# Patient Record
Sex: MALE | Race: OTHER | HISPANIC OR LATINO | URBAN - METROPOLITAN AREA
[De-identification: names, ages, dates, MRNs, and addresses within clinical notes are randomized per-mention and may not be internally consistent; named-entity substitution may affect disease eponyms.]

---

## 2017-05-28 ENCOUNTER — INPATIENT (INPATIENT)
Facility: HOSPITAL | Age: 72
LOS: 4 days | Discharge: NURSING, REHAB OR LONG-TERM CARE | End: 2017-06-02
Attending: INTERNAL MEDICINE | Admitting: INTERNAL MEDICINE
Payer: MEDICARE

## 2017-05-28 VITALS
TEMPERATURE: 99 F | RESPIRATION RATE: 18 BRPM | DIASTOLIC BLOOD PRESSURE: 63 MMHG | HEART RATE: 82 BPM | SYSTOLIC BLOOD PRESSURE: 111 MMHG | OXYGEN SATURATION: 100 %

## 2017-05-28 DIAGNOSIS — Z29.9 ENCOUNTER FOR PROPHYLACTIC MEASURES, UNSPECIFIED: ICD-10-CM

## 2017-05-28 DIAGNOSIS — K46.9 UNSPECIFIED ABDOMINAL HERNIA WITHOUT OBSTRUCTION OR GANGRENE: Chronic | ICD-10-CM

## 2017-05-28 DIAGNOSIS — E87.1 HYPO-OSMOLALITY AND HYPONATREMIA: ICD-10-CM

## 2017-05-28 DIAGNOSIS — R41.82 ALTERED MENTAL STATUS, UNSPECIFIED: ICD-10-CM

## 2017-05-28 DIAGNOSIS — A41.9 SEPSIS, UNSPECIFIED ORGANISM: ICD-10-CM

## 2017-05-28 LAB
ACANTHOCYTES BLD QL SMEAR: SLIGHT — SIGNIFICANT CHANGE UP
ALBUMIN SERPL ELPH-MCNC: 3.1 G/DL — LOW (ref 3.3–5)
ALP SERPL-CCNC: 113 U/L — SIGNIFICANT CHANGE UP (ref 40–120)
ALT FLD-CCNC: 54 U/L — HIGH (ref 4–41)
APPEARANCE UR: SIGNIFICANT CHANGE UP
AST SERPL-CCNC: 90 U/L — HIGH (ref 4–40)
BASE EXCESS BLDV CALC-SCNC: 0.8 MMOL/L — SIGNIFICANT CHANGE UP
BASOPHILS # BLD AUTO: 0 K/UL — SIGNIFICANT CHANGE UP (ref 0–0.2)
BASOPHILS NFR BLD AUTO: 0 % — SIGNIFICANT CHANGE UP (ref 0–2)
BASOPHILS NFR SPEC: 0 % — SIGNIFICANT CHANGE UP (ref 0–2)
BILIRUB SERPL-MCNC: 1.5 MG/DL — HIGH (ref 0.2–1.2)
BILIRUB UR-MCNC: NEGATIVE — SIGNIFICANT CHANGE UP
BLOOD GAS VENOUS - CREATININE: 1.27 MG/DL — SIGNIFICANT CHANGE UP (ref 0.5–1.3)
BLOOD UR QL VISUAL: HIGH
BUN SERPL-MCNC: 24 MG/DL — HIGH (ref 7–23)
BUN SERPL-MCNC: 27 MG/DL — HIGH (ref 7–23)
CALCIUM SERPL-MCNC: 7.2 MG/DL — LOW (ref 8.4–10.5)
CALCIUM SERPL-MCNC: 8.2 MG/DL — LOW (ref 8.4–10.5)
CHLORIDE BLDV-SCNC: 82 MMOL/L — LOW (ref 96–108)
CHLORIDE SERPL-SCNC: 76 MMOL/L — LOW (ref 98–107)
CHLORIDE SERPL-SCNC: 85 MMOL/L — LOW (ref 98–107)
CHLORIDE UR-SCNC: 4 MMOL/L — SIGNIFICANT CHANGE UP
CK MB BLD-MCNC: 5.3 — HIGH (ref 0–2.5)
CK MB BLD-MCNC: 8.19 NG/ML — HIGH (ref 1–6.6)
CK SERPL-CCNC: 156 U/L — SIGNIFICANT CHANGE UP (ref 30–200)
CO2 SERPL-SCNC: 18 MMOL/L — LOW (ref 22–31)
CO2 SERPL-SCNC: 22 MMOL/L — SIGNIFICANT CHANGE UP (ref 22–31)
COLOR SPEC: YELLOW — SIGNIFICANT CHANGE UP
CREAT SERPL-MCNC: 1.09 MG/DL — SIGNIFICANT CHANGE UP (ref 0.5–1.3)
CREAT SERPL-MCNC: 1.25 MG/DL — SIGNIFICANT CHANGE UP (ref 0.5–1.3)
EOSINOPHIL # BLD AUTO: 0 K/UL — SIGNIFICANT CHANGE UP (ref 0–0.5)
EOSINOPHIL NFR BLD AUTO: 0 % — SIGNIFICANT CHANGE UP (ref 0–6)
EOSINOPHIL NFR FLD: 0.9 % — SIGNIFICANT CHANGE UP (ref 0–6)
GAS PNL BLDV: 108 MMOL/L — CRITICAL LOW (ref 136–146)
GIANT PLATELETS BLD QL SMEAR: PRESENT — SIGNIFICANT CHANGE UP
GLUCOSE BLDV-MCNC: 105 — HIGH (ref 70–99)
GLUCOSE SERPL-MCNC: 102 MG/DL — HIGH (ref 70–99)
GLUCOSE SERPL-MCNC: 90 MG/DL — SIGNIFICANT CHANGE UP (ref 70–99)
GLUCOSE UR-MCNC: NEGATIVE — SIGNIFICANT CHANGE UP
HCO3 BLDV-SCNC: 24 MMOL/L — SIGNIFICANT CHANGE UP (ref 20–27)
HCT VFR BLD CALC: 37.3 % — LOW (ref 39–50)
HCT VFR BLDV CALC: 42.3 % — SIGNIFICANT CHANGE UP (ref 39–51)
HGB BLD-MCNC: 13.6 G/DL — SIGNIFICANT CHANGE UP (ref 13–17)
HGB BLDV-MCNC: 13.8 G/DL — SIGNIFICANT CHANGE UP (ref 13–17)
IMM GRANULOCYTES NFR BLD AUTO: 1.1 % — SIGNIFICANT CHANGE UP (ref 0–1.5)
KETONES UR-MCNC: NEGATIVE — SIGNIFICANT CHANGE UP
LACTATE BLDV-MCNC: 1.9 MMOL/L — SIGNIFICANT CHANGE UP (ref 0.5–2)
LEUKOCYTE ESTERASE UR-ACNC: HIGH
LIDOCAIN IGE QN: 144.6 U/L — HIGH (ref 7–60)
LYMPHOCYTES # BLD AUTO: 0.32 K/UL — LOW (ref 1–3.3)
LYMPHOCYTES # BLD AUTO: 2.7 % — LOW (ref 13–44)
LYMPHOCYTES NFR SPEC AUTO: 2.6 % — LOW (ref 13–44)
MAGNESIUM SERPL-MCNC: 1.8 MG/DL — SIGNIFICANT CHANGE UP (ref 1.6–2.6)
MCHC RBC-ENTMCNC: 30.1 PG — SIGNIFICANT CHANGE UP (ref 27–34)
MCHC RBC-ENTMCNC: 36.5 % — HIGH (ref 32–36)
MCV RBC AUTO: 82.5 FL — SIGNIFICANT CHANGE UP (ref 80–100)
MONOCYTES # BLD AUTO: 0.39 K/UL — SIGNIFICANT CHANGE UP (ref 0–0.9)
MONOCYTES NFR BLD AUTO: 3.3 % — SIGNIFICANT CHANGE UP (ref 2–14)
MONOCYTES NFR BLD: 0.9 % — LOW (ref 2–9)
NEUTROPHIL AB SER-ACNC: 88.6 % — HIGH (ref 43–77)
NEUTROPHILS # BLD AUTO: 11.07 K/UL — HIGH (ref 1.8–7.4)
NEUTROPHILS NFR BLD AUTO: 92.9 % — HIGH (ref 43–77)
NEUTS BAND # BLD: 6.1 % — HIGH (ref 0–6)
NITRITE UR-MCNC: NEGATIVE — SIGNIFICANT CHANGE UP
NON-SQ EPI CELLS # UR AUTO: <1 — SIGNIFICANT CHANGE UP
OSMOLALITY SERPL: 244 MOSMO/KG — LOW (ref 275–295)
OSMOLALITY UR: 126 MOSMO/KG — SIGNIFICANT CHANGE UP (ref 50–1200)
OVALOCYTES BLD QL SMEAR: SLIGHT — SIGNIFICANT CHANGE UP
PCO2 BLDV: 37 MMHG — LOW (ref 41–51)
PH BLDV: 7.44 PH — HIGH (ref 7.32–7.43)
PH UR: 6 — SIGNIFICANT CHANGE UP (ref 4.6–8)
PLATELET # BLD AUTO: 120 K/UL — LOW (ref 150–400)
PLATELET COUNT - ESTIMATE: SIGNIFICANT CHANGE UP
PMV BLD: 10.6 FL — SIGNIFICANT CHANGE UP (ref 7–13)
PO2 BLDV: < 24 MMHG — LOW (ref 35–40)
POIKILOCYTOSIS BLD QL AUTO: SLIGHT — SIGNIFICANT CHANGE UP
POTASSIUM BLDV-SCNC: 3.8 MMOL/L — SIGNIFICANT CHANGE UP (ref 3.4–4.5)
POTASSIUM SERPL-MCNC: 3.7 MMOL/L — SIGNIFICANT CHANGE UP (ref 3.5–5.3)
POTASSIUM SERPL-MCNC: 4.2 MMOL/L — SIGNIFICANT CHANGE UP (ref 3.5–5.3)
POTASSIUM SERPL-SCNC: 3.7 MMOL/L — SIGNIFICANT CHANGE UP (ref 3.5–5.3)
POTASSIUM SERPL-SCNC: 4.2 MMOL/L — SIGNIFICANT CHANGE UP (ref 3.5–5.3)
POTASSIUM UR-SCNC: 6.9 MEQ/L — SIGNIFICANT CHANGE UP
PROT SERPL-MCNC: 7.1 G/DL — SIGNIFICANT CHANGE UP (ref 6–8.3)
PROT UR-MCNC: 30 — SIGNIFICANT CHANGE UP
RBC # BLD: 4.52 M/UL — SIGNIFICANT CHANGE UP (ref 4.2–5.8)
RBC # FLD: 13.9 % — SIGNIFICANT CHANGE UP (ref 10.3–14.5)
RBC CASTS # UR COMP ASSIST: SIGNIFICANT CHANGE UP (ref 0–?)
SAO2 % BLDV: 20.8 % — LOW (ref 60–85)
SODIUM SERPL-SCNC: 115 MMOL/L — CRITICAL LOW (ref 135–145)
SODIUM SERPL-SCNC: 119 MMOL/L — CRITICAL LOW (ref 135–145)
SODIUM UR-SCNC: 6 MEQ/L — SIGNIFICANT CHANGE UP
SP GR SPEC: 1.01 — SIGNIFICANT CHANGE UP (ref 1–1.03)
SQUAMOUS # UR AUTO: SIGNIFICANT CHANGE UP
TROPONIN T SERPL-MCNC: < 0.06 NG/ML — SIGNIFICANT CHANGE UP (ref 0–0.06)
UROBILINOGEN FLD QL: NORMAL E.U. — SIGNIFICANT CHANGE UP (ref 0.1–0.2)
VARIANT LYMPHS # BLD: 0.9 % — SIGNIFICANT CHANGE UP
WBC # BLD: 11.91 K/UL — HIGH (ref 3.8–10.5)
WBC # FLD AUTO: 11.91 K/UL — HIGH (ref 3.8–10.5)
WBC UR QL: >50 — HIGH (ref 0–?)

## 2017-05-28 PROCEDURE — 99223 1ST HOSP IP/OBS HIGH 75: CPT

## 2017-05-28 PROCEDURE — 70450 CT HEAD/BRAIN W/O DYE: CPT | Mod: 26

## 2017-05-28 PROCEDURE — 99291 CRITICAL CARE FIRST HOUR: CPT

## 2017-05-28 PROCEDURE — 74177 CT ABD & PELVIS W/CONTRAST: CPT | Mod: 26

## 2017-05-28 RX ORDER — PIPERACILLIN AND TAZOBACTAM 4; .5 G/20ML; G/20ML
3.38 INJECTION, POWDER, LYOPHILIZED, FOR SOLUTION INTRAVENOUS ONCE
Qty: 0 | Refills: 0 | Status: COMPLETED | OUTPATIENT
Start: 2017-05-28 | End: 2017-05-28

## 2017-05-28 RX ORDER — SODIUM CHLORIDE 9 MG/ML
1000 INJECTION INTRAMUSCULAR; INTRAVENOUS; SUBCUTANEOUS
Qty: 0 | Refills: 0 | Status: DISCONTINUED | OUTPATIENT
Start: 2017-05-28 | End: 2017-05-29

## 2017-05-28 RX ORDER — HEPARIN SODIUM 5000 [USP'U]/ML
5000 INJECTION INTRAVENOUS; SUBCUTANEOUS EVERY 8 HOURS
Qty: 0 | Refills: 0 | Status: DISCONTINUED | OUTPATIENT
Start: 2017-05-28 | End: 2017-06-02

## 2017-05-28 RX ORDER — SODIUM CHLORIDE 9 MG/ML
2000 INJECTION INTRAMUSCULAR; INTRAVENOUS; SUBCUTANEOUS ONCE
Qty: 0 | Refills: 0 | Status: COMPLETED | OUTPATIENT
Start: 2017-05-28 | End: 2017-05-28

## 2017-05-28 RX ORDER — ACETAMINOPHEN 500 MG
1000 TABLET ORAL ONCE
Qty: 0 | Refills: 0 | Status: COMPLETED | OUTPATIENT
Start: 2017-05-28 | End: 2017-05-28

## 2017-05-28 RX ORDER — SODIUM CHLORIDE 5 G/100ML
500 INJECTION, SOLUTION INTRAVENOUS
Qty: 0 | Refills: 0 | Status: DISCONTINUED | OUTPATIENT
Start: 2017-05-28 | End: 2017-05-28

## 2017-05-28 RX ADMIN — SODIUM CHLORIDE 30 MILLILITER(S): 5 INJECTION, SOLUTION INTRAVENOUS at 18:00

## 2017-05-28 RX ADMIN — SODIUM CHLORIDE 2000 MILLILITER(S): 9 INJECTION INTRAMUSCULAR; INTRAVENOUS; SUBCUTANEOUS at 15:29

## 2017-05-28 RX ADMIN — SODIUM CHLORIDE 125 MILLILITER(S): 9 INJECTION INTRAMUSCULAR; INTRAVENOUS; SUBCUTANEOUS at 22:27

## 2017-05-28 RX ADMIN — Medication 400 MILLIGRAM(S): at 15:39

## 2017-05-28 RX ADMIN — PIPERACILLIN AND TAZOBACTAM 200 GRAM(S): 4; .5 INJECTION, POWDER, LYOPHILIZED, FOR SOLUTION INTRAVENOUS at 16:38

## 2017-05-28 NOTE — CONSULT NOTE ADULT - PROBLEM SELECTOR RECOMMENDATION 3
- CTH did not reveal acute intracranial mass-effect, hemorrhage, midline shift, or   abnormal extra-axial fluid collection. Pt has evidence of decreased attenuation in the white matter likely  microvascular ischemic change on CTH.   - AMS likely due to hyponatremia and sepsis.

## 2017-05-28 NOTE — ED ADULT TRIAGE NOTE - CHIEF COMPLAINT QUOTE
Pt brought in by son for multiple complaints. Per son, pt travelled to [facility] and returned 1 month ago acting differently, having periods of disorientation, also c/o L groin pain - was seen at another hospital and dx w/ a hernia. Pt also c/o numbness in bilat LE which got worse yesterday, per son pt couldn't walk at that time. Pt A&OX3, ambulatory, appears in no distress at this time.

## 2017-05-28 NOTE — ED PROVIDER NOTE - MEDICAL DECISION MAKING DETAILS
71 y/o M PMHx HTN and multiple abdominal hernia surgeries brought in by his sons for waxing and waning mental status x 1 month. and pain of left lower abdomen.  Plan labs, CT scan of abdomen. U/A to Rule out sepsis

## 2017-05-28 NOTE — ED ADULT NURSE NOTE - CHIEF COMPLAINT QUOTE
Pt brought in by son for multiple complaints. Per son, pt travelled to [facility] and returned 1 month ago acting differently, having periods of disorientation, also c/o L groin pain - was seen at another hospital and dx w/ a hernia. Pt also c/o numbness in bilat LE which got worse yesterday, per son pt couldn't walk at that time. Pt A&OX3, ambulatory, appears in no distress at this time.

## 2017-05-28 NOTE — ED PROVIDER NOTE - NS ED ATTENDING STATEMENT MOD
I have personally seen and examined this patient. I have fully participated in the care of this patient. I have reviewed all pertinent clinical information, including history physical exam, plan and the Resident's note and agree except as noted I have personally performed a face to face diagnostic evaluation on this patient. I have reviewed the PA note and agree with the history, exam, and plan of care, except as noted. I have personally performed a face to face diagnostic evaluation on this patient. I have reviewed the ACP note and agree with the history, exam and plan of care, except as noted. I have personally seen and examined this patient.  I have fully participated in the care of this patient. I have reviewed all pertinent clinical information, including history, physical exam, plan and the Resident’s note and agree except as noted.

## 2017-05-28 NOTE — ED PROVIDER NOTE - CARE PLAN
Principal Discharge DX:	Hyponatremia Principal Discharge DX:	Hyponatremia  Secondary Diagnosis:	Altered mental state

## 2017-05-28 NOTE — ED ADULT NURSE NOTE - OBJECTIVE STATEMENT
pt accompanied by sons according to family pt returned form [facility] one month ago has been disoriented at times since has had tremors difficulty walking pt c/o numbness to his lower extremities MD reddyated

## 2017-05-28 NOTE — ED PROVIDER NOTE - OBJECTIVE STATEMENT
71 y/o M PMHx HTN and multiple abdominal hernia surgeries brought in by his sons for waxing and waning mental status x 1 month. Pt himself is complaining of LLQ abdominal pain and testicular pain (per sons, chronic after getting hernia surgeries done- was seen by his surgeon at [place] last week, no intervention was necessary). Pt is also c/o chills and lower ext pain x 1 week. Sons are concerned pt had a stroke, has had intermittent dysarthria and difficulty with gait. Per sons, the pt was brought into his house by the superintendant of his building yesterday. Today, the sons tried to get into his house, found him on the bathroom floor confused. Pt states he has not had any issues with his memory. Denies chest pain, N/V, or any other complaints. Admits anorexia.

## 2017-05-28 NOTE — PATIENT PROFILE ADULT. - NS TRANSFER PATIENT BELONGINGS
Jewelry/Money (specify)/Clothing/Cell Phone/PDA (specify)/2 rings. one gold colored. one with  figure. gold colored necklace

## 2017-05-28 NOTE — CONSULT NOTE ADULT - ASSESSMENT
Pt is a 72yoM w/ PMH of BPH, gallstones, gastric ulcer (hx unclear), diverticulosis, who presented from home with AMSx10 days likely 2/2 UTI, with hypovolemic hyponatremia likely 2/2 restricted Na intake with poor PO intake.     Pt currently does not meet criteria for MICU admission.

## 2017-05-28 NOTE — ED PROVIDER NOTE - ENMT, MLM
Airway patent, Nose No congestion, throat- membranes dry. No swelling or exudate. Neck supple. No JVD, No lymphadenopathy

## 2017-05-28 NOTE — ED PROVIDER NOTE - ATTENDING CONTRIBUTION TO CARE
DANIELLE Attending Note - Dr. Marx  71 y/o M PMHx HTN and multiple abdominal hernia surgeries brought in by his sons for waxing and waning mental status x 1 month now with loft lower groin pain.  PE: pt is alert and oriented only to family and self, perrl, ent normal, membranes are moist, neck supple. no lymphadenopathy or thyroid enlargement, No JVD.  Chest clear to P&A, Heart- reg rhythm without murmur, rubs or gallops, radial pulses equal bilaterally.  Abd is soft, with left lower quadrant tenderness a firm 2cm mass over left groin with tenderness, Bowel sounds are active. no mass or organomegaly. : No CVA tenderness. Neuro:  Pt alert and oriented x 3. Perrl    Distal neurosensory is intact. Motor function is 5/5 strength bilaterally.  No focal deficits. Extremities:  No edema.  Skin: warm and dry. DANIELLE Attending Note - Dr. Marx   73 y/o M PMHx HTN and multiple abdominal hernia surgeries brought in by his sons for waxing and waning mental status x 1 month. Pt himself is complaining of chronic LLQ abdominal pain and testicular pain since his hernia surgeres.  PE: pt is alert but mildly confused, perrl, ent normal, membranes are dry, neck supple. no lymphadenopathy or thyroid enlargement, No JVD.  Chest clear to P&A, Heart- reg rhythm without murmur, rubs or gallops, radial pulses equal bilaterally.  Abd is soft, non-tender, Bowel sounds are active. no mass or organomegaly. : No CVA tenderness. Neuro:  Pt alert and oriented x 3. Perrl    Distal neurosensory is intact. Motor function is 5/5 strength bilaterally.  No focal deficits. Extremities:  No edema.  Skin: warm and dry with poor skin turgor.

## 2017-05-29 DIAGNOSIS — G93.40 ENCEPHALOPATHY, UNSPECIFIED: ICD-10-CM

## 2017-05-29 DIAGNOSIS — N40.0 BENIGN PROSTATIC HYPERPLASIA WITHOUT LOWER URINARY TRACT SYMPTOMS: ICD-10-CM

## 2017-05-29 DIAGNOSIS — R41.82 ALTERED MENTAL STATUS, UNSPECIFIED: ICD-10-CM

## 2017-05-29 DIAGNOSIS — I10 ESSENTIAL (PRIMARY) HYPERTENSION: ICD-10-CM

## 2017-05-29 LAB
BUN SERPL-MCNC: 18 MG/DL — SIGNIFICANT CHANGE UP (ref 7–23)
BUN SERPL-MCNC: 19 MG/DL — SIGNIFICANT CHANGE UP (ref 7–23)
BUN SERPL-MCNC: 20 MG/DL — SIGNIFICANT CHANGE UP (ref 7–23)
BUN SERPL-MCNC: 20 MG/DL — SIGNIFICANT CHANGE UP (ref 7–23)
CALCIUM SERPL-MCNC: 7.2 MG/DL — LOW (ref 8.4–10.5)
CALCIUM SERPL-MCNC: 7.2 MG/DL — LOW (ref 8.4–10.5)
CALCIUM SERPL-MCNC: 7.4 MG/DL — LOW (ref 8.4–10.5)
CALCIUM SERPL-MCNC: 7.5 MG/DL — LOW (ref 8.4–10.5)
CHLORIDE SERPL-SCNC: 89 MMOL/L — LOW (ref 98–107)
CHLORIDE SERPL-SCNC: 91 MMOL/L — LOW (ref 98–107)
CHLORIDE SERPL-SCNC: 92 MMOL/L — LOW (ref 98–107)
CHLORIDE SERPL-SCNC: 93 MMOL/L — LOW (ref 98–107)
CO2 SERPL-SCNC: 17 MMOL/L — LOW (ref 22–31)
CO2 SERPL-SCNC: 18 MMOL/L — LOW (ref 22–31)
CO2 SERPL-SCNC: 19 MMOL/L — LOW (ref 22–31)
CO2 SERPL-SCNC: 19 MMOL/L — LOW (ref 22–31)
CREAT SERPL-MCNC: 0.95 MG/DL — SIGNIFICANT CHANGE UP (ref 0.5–1.3)
CREAT SERPL-MCNC: 1.01 MG/DL — SIGNIFICANT CHANGE UP (ref 0.5–1.3)
CREAT SERPL-MCNC: 1.01 MG/DL — SIGNIFICANT CHANGE UP (ref 0.5–1.3)
CREAT SERPL-MCNC: 1.06 MG/DL — SIGNIFICANT CHANGE UP (ref 0.5–1.3)
GLUCOSE SERPL-MCNC: 130 MG/DL — HIGH (ref 70–99)
GLUCOSE SERPL-MCNC: 72 MG/DL — SIGNIFICANT CHANGE UP (ref 70–99)
GLUCOSE SERPL-MCNC: 82 MG/DL — SIGNIFICANT CHANGE UP (ref 70–99)
GLUCOSE SERPL-MCNC: 99 MG/DL — SIGNIFICANT CHANGE UP (ref 70–99)
POTASSIUM SERPL-MCNC: 3.5 MMOL/L — SIGNIFICANT CHANGE UP (ref 3.5–5.3)
POTASSIUM SERPL-MCNC: 3.6 MMOL/L — SIGNIFICANT CHANGE UP (ref 3.5–5.3)
POTASSIUM SERPL-MCNC: 3.6 MMOL/L — SIGNIFICANT CHANGE UP (ref 3.5–5.3)
POTASSIUM SERPL-MCNC: 3.8 MMOL/L — SIGNIFICANT CHANGE UP (ref 3.5–5.3)
POTASSIUM SERPL-SCNC: 3.5 MMOL/L — SIGNIFICANT CHANGE UP (ref 3.5–5.3)
POTASSIUM SERPL-SCNC: 3.6 MMOL/L — SIGNIFICANT CHANGE UP (ref 3.5–5.3)
POTASSIUM SERPL-SCNC: 3.6 MMOL/L — SIGNIFICANT CHANGE UP (ref 3.5–5.3)
POTASSIUM SERPL-SCNC: 3.8 MMOL/L — SIGNIFICANT CHANGE UP (ref 3.5–5.3)
SODIUM SERPL-SCNC: 123 MMOL/L — LOW (ref 135–145)
SODIUM SERPL-SCNC: 125 MMOL/L — LOW (ref 135–145)
SODIUM SERPL-SCNC: 125 MMOL/L — LOW (ref 135–145)
SODIUM SERPL-SCNC: 126 MMOL/L — LOW (ref 135–145)
SPECIMEN SOURCE: SIGNIFICANT CHANGE UP

## 2017-05-29 RX ORDER — DOCUSATE SODIUM 100 MG
100 CAPSULE ORAL
Qty: 0 | Refills: 0 | Status: DISCONTINUED | OUTPATIENT
Start: 2017-05-29 | End: 2017-06-02

## 2017-05-29 RX ORDER — IPRATROPIUM BROMIDE 0.2 MG/ML
1 SOLUTION, NON-ORAL INHALATION EVERY 6 HOURS
Qty: 0 | Refills: 0 | Status: DISCONTINUED | OUTPATIENT
Start: 2017-05-29 | End: 2017-06-02

## 2017-05-29 RX ORDER — SENNA PLUS 8.6 MG/1
2 TABLET ORAL AT BEDTIME
Qty: 0 | Refills: 0 | Status: DISCONTINUED | OUTPATIENT
Start: 2017-05-29 | End: 2017-06-02

## 2017-05-29 RX ORDER — SODIUM CHLORIDE 9 MG/ML
1000 INJECTION INTRAMUSCULAR; INTRAVENOUS; SUBCUTANEOUS
Qty: 0 | Refills: 0 | Status: DISCONTINUED | OUTPATIENT
Start: 2017-05-29 | End: 2017-05-30

## 2017-05-29 RX ORDER — SODIUM CHLORIDE 9 MG/ML
1000 INJECTION INTRAMUSCULAR; INTRAVENOUS; SUBCUTANEOUS
Qty: 0 | Refills: 0 | Status: DISCONTINUED | OUTPATIENT
Start: 2017-05-29 | End: 2017-05-29

## 2017-05-29 RX ORDER — TIOTROPIUM BROMIDE 18 UG/1
1 CAPSULE ORAL; RESPIRATORY (INHALATION) DAILY
Qty: 0 | Refills: 0 | Status: DISCONTINUED | OUTPATIENT
Start: 2017-05-29 | End: 2017-06-02

## 2017-05-29 RX ORDER — FAMOTIDINE 10 MG/ML
20 INJECTION INTRAVENOUS DAILY
Qty: 0 | Refills: 0 | Status: DISCONTINUED | OUTPATIENT
Start: 2017-05-29 | End: 2017-06-02

## 2017-05-29 RX ORDER — PIPERACILLIN AND TAZOBACTAM 4; .5 G/20ML; G/20ML
3.38 INJECTION, POWDER, LYOPHILIZED, FOR SOLUTION INTRAVENOUS EVERY 8 HOURS
Qty: 0 | Refills: 0 | Status: DISCONTINUED | OUTPATIENT
Start: 2017-05-29 | End: 2017-05-31

## 2017-05-29 RX ORDER — FINASTERIDE 5 MG/1
5 TABLET, FILM COATED ORAL DAILY
Qty: 0 | Refills: 0 | Status: DISCONTINUED | OUTPATIENT
Start: 2017-05-29 | End: 2017-06-02

## 2017-05-29 RX ORDER — ACETAMINOPHEN 500 MG
650 TABLET ORAL EVERY 6 HOURS
Qty: 0 | Refills: 0 | Status: DISCONTINUED | OUTPATIENT
Start: 2017-05-29 | End: 2017-06-02

## 2017-05-29 RX ORDER — PANTOPRAZOLE SODIUM 20 MG/1
40 TABLET, DELAYED RELEASE ORAL
Qty: 0 | Refills: 0 | Status: DISCONTINUED | OUTPATIENT
Start: 2017-05-29 | End: 2017-06-02

## 2017-05-29 RX ORDER — TAMSULOSIN HYDROCHLORIDE 0.4 MG/1
0.4 CAPSULE ORAL AT BEDTIME
Qty: 0 | Refills: 0 | Status: DISCONTINUED | OUTPATIENT
Start: 2017-05-29 | End: 2017-06-02

## 2017-05-29 RX ADMIN — Medication 1 PUFF(S): at 04:08

## 2017-05-29 RX ADMIN — Medication 650 MILLIGRAM(S): at 19:54

## 2017-05-29 RX ADMIN — Medication 100 MILLIGRAM(S): at 06:39

## 2017-05-29 RX ADMIN — PIPERACILLIN AND TAZOBACTAM 25 GRAM(S): 4; .5 INJECTION, POWDER, LYOPHILIZED, FOR SOLUTION INTRAVENOUS at 13:00

## 2017-05-29 RX ADMIN — Medication 650 MILLIGRAM(S): at 19:37

## 2017-05-29 RX ADMIN — Medication 1 PUFF(S): at 12:55

## 2017-05-29 RX ADMIN — FAMOTIDINE 20 MILLIGRAM(S): 10 INJECTION INTRAVENOUS at 12:56

## 2017-05-29 RX ADMIN — Medication 1 PUFF(S): at 21:07

## 2017-05-29 RX ADMIN — HEPARIN SODIUM 5000 UNIT(S): 5000 INJECTION INTRAVENOUS; SUBCUTANEOUS at 21:09

## 2017-05-29 RX ADMIN — PIPERACILLIN AND TAZOBACTAM 25 GRAM(S): 4; .5 INJECTION, POWDER, LYOPHILIZED, FOR SOLUTION INTRAVENOUS at 06:39

## 2017-05-29 RX ADMIN — SODIUM CHLORIDE 125 MILLILITER(S): 9 INJECTION INTRAMUSCULAR; INTRAVENOUS; SUBCUTANEOUS at 21:09

## 2017-05-29 RX ADMIN — PANTOPRAZOLE SODIUM 40 MILLIGRAM(S): 20 TABLET, DELAYED RELEASE ORAL at 06:39

## 2017-05-29 RX ADMIN — TAMSULOSIN HYDROCHLORIDE 0.4 MILLIGRAM(S): 0.4 CAPSULE ORAL at 21:09

## 2017-05-29 RX ADMIN — Medication 100 MILLIGRAM(S): at 17:03

## 2017-05-29 RX ADMIN — SODIUM CHLORIDE 50 MILLILITER(S): 9 INJECTION INTRAMUSCULAR; INTRAVENOUS; SUBCUTANEOUS at 17:03

## 2017-05-29 RX ADMIN — HEPARIN SODIUM 5000 UNIT(S): 5000 INJECTION INTRAVENOUS; SUBCUTANEOUS at 06:38

## 2017-05-29 RX ADMIN — Medication 650 MILLIGRAM(S): at 00:50

## 2017-05-29 RX ADMIN — HEPARIN SODIUM 5000 UNIT(S): 5000 INJECTION INTRAVENOUS; SUBCUTANEOUS at 13:00

## 2017-05-29 RX ADMIN — PIPERACILLIN AND TAZOBACTAM 25 GRAM(S): 4; .5 INJECTION, POWDER, LYOPHILIZED, FOR SOLUTION INTRAVENOUS at 21:07

## 2017-05-29 RX ADMIN — SODIUM CHLORIDE 50 MILLILITER(S): 9 INJECTION INTRAMUSCULAR; INTRAVENOUS; SUBCUTANEOUS at 13:00

## 2017-05-29 RX ADMIN — SODIUM CHLORIDE 50 MILLILITER(S): 9 INJECTION INTRAMUSCULAR; INTRAVENOUS; SUBCUTANEOUS at 04:05

## 2017-05-29 RX ADMIN — Medication 1 PUFF(S): at 17:03

## 2017-05-29 RX ADMIN — SENNA PLUS 2 TABLET(S): 8.6 TABLET ORAL at 21:09

## 2017-05-29 RX ADMIN — Medication 650 MILLIGRAM(S): at 01:10

## 2017-05-29 RX ADMIN — TIOTROPIUM BROMIDE 1 CAPSULE(S): 18 CAPSULE ORAL; RESPIRATORY (INHALATION) at 12:56

## 2017-05-29 RX ADMIN — FINASTERIDE 5 MILLIGRAM(S): 5 TABLET, FILM COATED ORAL at 12:56

## 2017-05-29 NOTE — PROGRESS NOTE ADULT - SUBJECTIVE AND OBJECTIVE BOX
Seen and examined with daughter in room. eating breakfast. I feel 90%better. Daughter also said that he feels much better. ID consulted.

## 2017-05-29 NOTE — H&P ADULT - NSHPSOCIALHISTORY_GEN_ALL_CORE
Social History:    Marital Status:  (   )    ( X ) Single    (   )    (  )   Occupation: Retired  Lives with: ( X ) alone  (  ) children   (  ) spouse   (  ) parents  (  ) other    Substance Use (street drugs): ( X ) never used  (  ) other:  Tobacco Usage:  ( X ) never smoked   (   ) former smoker   (   ) current smoker  (     ) pack year  (        ) last cigarette date  Alcohol Usage: Denies

## 2017-05-29 NOTE — H&P ADULT - PROBLEM SELECTOR PLAN 3
- Patient currently AAO x3, likely had AMS due to sepsis and hyponatremia, no focal findings on examination and CTH neg, therefore low concern for CVA currently  - Will monitor on neuro checks q4h for now and re-eval in AM

## 2017-05-29 NOTE — H&P ADULT - PROBLEM SELECTOR PLAN 1
- Patient with sepsis (febrile to 100.9, tachycardai) 2/2 UTI  - Got zosyn - Patient with sepsis (febrile to 100.9, tachycardai) 2/2 UTI  - Got zosyn in ED, will c/w for now  - Follow up on BCx and UCx (Sent in ED)

## 2017-05-29 NOTE — PROGRESS NOTE ADULT - PROBLEM SELECTOR PLAN 2
- Patient with severe hyponatremia with work up showing him to have hypovolemic hyponatremia (low serum osmolality, low urine sodium) 2/2 poor PO intake and increased insensible losses from his sepsis  - Sodium improved from 115 -> 119 in ED with hydration, will c/w NS at 125 cc/hr for now and monitor BMP q4h for now and adjust based on sodium levels 2/2 polydypsia c/b poor po intake and sepsis  - goal to keep at <10meq increase over 24 hrs to avoid risk of CPM  - repeat BMP Q 8.   - modify IVF prn , pt currently on NS

## 2017-05-29 NOTE — H&P ADULT - PROBLEM SELECTOR PLAN 2
- Patient with severe hyponatremia with work up showing him to have hypovolemic hyponatremia (low serum osmolality, low urine sodium) 2/2 poor PO intake and increased insensible losses from his sepsis  - Sodium improved from 115 -> 119 in ED with hydration, will c/w NS at 125 cc/hr for now and monitor BMP q4h for now and adjust based on sodium levels

## 2017-05-29 NOTE — PHYSICAL THERAPY INITIAL EVALUATION ADULT - PATIENT PROFILE REVIEW, REHAB EVAL
activity order- OOB to chair - OK to perform as much patient is able to tolerate as per RN Scout/yes

## 2017-05-29 NOTE — H&P ADULT - HISTORY OF PRESENT ILLNESS
This is a 72M with history of HTN, BPH, and inguinal hernia s/p mesh repair who is brought to the hospital by his family due to altered mental status and lethargy for the past week to 10 days. As per the family, the patient has been complaining of b/l lower back pain with radiation down to his legs which the patient said reminded him of his inguinal hernia pain. They took the patient to see his surgeon at [place] last week and while there was told that his symptoms were not due to the hernia and was given a referral to . Over the next few days, the patient continued to deteriorate, said that he would have episodes where his speech would become garbled/incomprehensible, he would have episodes of forgetfulness, and had episodes where he would need assistance to do basic activities when previously he was completely independent. Said that today, on his day of admission, he was not responding to family and they decided to check up on him at his apartment and found him to be laying on the floor. They therefore brought him to the hospital for evaluation.    The patient himself said that his pain seemed to have started last week, said that the pain is located at both his flanks and seems to radiate down to his testicles. Said that he has also had intermittent chills/diaphoresis but denied check his temperature at home. Said that he has felt more weak than usual, said that he has also had a severely decreased appetite and has not eaten much over the past week. Said that he is having some coughing but denied having any hematuria/dysuria, denied any n/v. Denies any other symptoms at present.    In the ED, his vitals were T 98.9, Tmax 100.9, P 82, Pmax 100, /63, R 18, O2 sat 100% RA. His lab work showed him to have leukocytosis with 6% bands, hyponatremia and a positive UA. CT Head did not reveal any acute findings and CT A/P showed mild enhancement of the urothelium suggesting urosepsis. He was given NS 2L, placed on 3% NS at 60cc/hr for 4 hrs, pip/tazo 3.375g IVPB x1, and acetaminophen 1g IVPB x1. He was evaluated by MICU in ED. He was then admitted to medicine.

## 2017-05-29 NOTE — H&P ADULT - NSHPLABSRESULTS_GEN_ALL_CORE
LABS (28 May 2017 15:20) :                        13.6   11.91 )-----------( 120                   37.3     Bands: 6.1%    115            76    |                |  119<LL>  |  85<L>  |  24<H>  -----------------------------------------<  90  3.7   |  18<L>  |  1.09      Ca    7.2<L>      28 May 2017 20:54  Mg     1.8         TPro  7.1  /  Alb  3.1<L>  /  TBili  1.5<H>  /  DBili  x   /  AST  90<H>  /  ALT  54<H>  /  AlkPhos  113        Osmolality, Serum: 244 mosmo/kg    Sodium, Random Urine: 6    Potassium, Random Urine: 6.9    Chloride, Random Urine: 4    Osmolality, Random Urine: 126 mosmo/kg      CARDIAC MARKERS ( 28 May 2017 15:20 )  x     / < 0.06 ng/mL / 156 u/L / 8.19 ng/mL / x        Urinalysis Basic - ( 28 May 2017 15:20 )  Color: YELLOW / Appearance: TURBID / S.012 / pH: 6.0  Gluc: NEGATIVE / Ketone: NEGATIVE  / Bili: NEGATIVE / Urobili: NORMAL E.U.   Blood: MODERATE / Protein: 30 / Nitrite: NEGATIVE   Leuk Esterase: LARGE / RBC: 25-50 / WBC >50   Sq Epi: OCC / Non Sq Epi: x / Bacteria: x    CT Head - No acute intracranial bleeding, mass effect, or evidence of an acute   territorial infarct.    CT Abd/Pelvis - Mild enhancement of the urothelium suggesting urosepsis. No evidence of bowel obstruction. LABS (28 May 2017 15:20) :                        13.6   11.91 )-----------( 120                   37.3     Bands: 6.1%    115            76    |                |  119<LL>  |  85<L>  |  24<H>  -----------------------------------------<  90  3.7   |  18<L>  |  1.09      Ca    7.2<L>       Mg     1.8         TPro  7.1  /  Alb  3.1<L>  /  TBili  1.5<H>  /  DBili  x   /  AST  90<H>  /  ALT  54<H>  /  AlkPhos  113       Osmolality, Serum: 244 mosmo/kg    Sodium, Random Urine: 6    Potassium, Random Urine: 6.9    Chloride, Random Urine: 4    Osmolality, Random Urine: 126 mosmo/kg      CARDIAC MARKERS   x     / < 0.06 ng/mL / 156 u/L / 8.19 ng/mL / x        Urinalysis Basic -   Color: YELLOW / Appearance: TURBID / S.012 / pH: 6.0  Gluc: NEGATIVE / Ketone: NEGATIVE  / Bili: NEGATIVE / Urobili: NORMAL E.U.   Blood: MODERATE / Protein: 30 / Nitrite: NEGATIVE   Leuk Esterase: LARGE / RBC: 25-50 / WBC >50   Sq Epi: OCC / Non Sq Epi: x / Bacteria: x    CT Head - No acute intracranial bleeding, mass effect, or evidence of an acute   territorial infarct.    CT Abd/Pelvis - Mild enhancement of the urothelium suggesting urosepsis. No evidence of bowel obstruction. LABS (28 May 2017 15:20) :                        13.6   11.91 )-----------( 120                   37.3     Bands: 6.1%    115            76    |                |  119<LL>  |  85<L>  |  24<H>  -----------------------------------------<  90  3.7   |  18<L>  |  1.09      Ca    7.2<L>       Mg     1.8         TPro  7.1  /  Alb  3.1<L>  /  TBili  1.5<H>  /  DBili  x   /  AST  90<H>  /  ALT  54<H>  /  AlkPhos  113       Osmolality, Serum: 244 mosmo/kg    Sodium, Random Urine: 6    Potassium, Random Urine: 6.9    Chloride, Random Urine: 4    Osmolality, Random Urine: 126 mosmo/kg      CARDIAC MARKERS   x     / < 0.06 ng/mL / 156 u/L / 8.19 ng/mL / x        Urinalysis Basic -   Color: YELLOW / Appearance: TURBID / S.012 / pH: 6.0  Gluc: NEGATIVE / Ketone: NEGATIVE  / Bili: NEGATIVE / Urobili: NORMAL E.U.   Blood: MODERATE / Protein: 30 / Nitrite: NEGATIVE   Leuk Esterase: LARGE / RBC: 25-50 / WBC >50   Sq Epi: OCC / Non Sq Epi: x / Bacteria: x    CT Head - No acute intracranial bleeding, mass effect, or evidence of an acute   territorial infarct.    CT Abd/Pelvis - Mild enhancement of the urothelium suggesting urosepsis. No evidence of bowel obstruction.    EKG - Sinus rhythm with 1st degree AV block at 91, QTc 494, normal axis

## 2017-05-29 NOTE — H&P ADULT - NSHPREVIEWOFSYSTEMS_GEN_ALL_CORE
REVIEW OF SYSTEMS:  CONSTITUTIONAL: +Lethargy, +Generalized Weakness, +Anorexia  EYES/ENT: No visual changes;  No dysphagia  NECK: No pain or stiffness  RESPIRATORY: +Cough; No sputum production, wheezing, hemoptysis; No shortness of breath  CARDIOVASCULAR: No chest pain or palpitations; No lower extremity edema  GASTROINTESTINAL: No abdominal or epigastric pain. No nausea, vomiting, or hematemesis; No diarrhea or constipation. No melena or hematochezia.  BACK: +b/l flank pain with radiation to groin  GENITOURINARY: No dysuria, frequency or hematuria  NEUROLOGICAL: +Confusion; No numbness or focal weakness  SKIN: No itching, burning, rashes, or lesions   All other review of systems is negative unless indicated above.

## 2017-05-29 NOTE — PROGRESS NOTE ADULT - SUBJECTIVE AND OBJECTIVE BOX
**R1 ACCEPT NOTE**      Patient is a 72y old  Male who presents with a chief complaint of Altered mental status, lethargy (29 May 2017 00:37)    Agree with HPI:    This is a 72M with history of HTN, BPH, and inguinal hernia s/p mesh repair who is brought to the hospital by his family due to altered mental status and lethargy for the past week to 10 days. As per the family, the patient has been complaining of b/l lower back pain with radiation down to his legs which the patient said reminded him of his inguinal hernia pain. They took the patient to see his surgeon at [place] last week and while there was told that his symptoms were not due to the hernia and was given a referral to . Over the next few days, the patient continued to deteriorate, said that he would have episodes where his speech would become garbled/incomprehensible, he would have episodes of forgetfulness, and had episodes where he would need assistance to do basic activities when previously he was completely independent. Said that today, on his day of admission, he was not responding to family and they decided to check up on him at his apartment and found him to be laying on the floor. They therefore brought him to the hospital for evaluation.    The patient himself said that his pain seemed to have started last week, said that the pain is located at both his flanks and seems to radiate down to his testicles. Said that he has also had intermittent chills/diaphoresis but denied check his temperature at home. Said that he has felt more weak than usual, said that he has also had a severely decreased appetite and has not eaten much over the past week. Said that he is having some coughing but denied having any hematuria/dysuria, denied any n/v. Denies any other symptoms at present.    In the ED, his vitals were T 98.9, Tmax 100.9, P 82, Pmax 100, /63, R 18, O2 sat 100% RA. His lab work showed him to have leukocytosis with 6% bands, hyponatremia and a positive UA. CT Head did not reveal any acute findings and CT A/P showed mild enhancement of the urothelium suggesting urosepsis. He was given NS 2L, placed on 3% NS at 60cc/hr for 4 hrs, pip/tazo 3.375g IVPB x1, and acetaminophen 1g IVPB x1. He was evaluated by MICU in ED. He was then admitted to medicine.                                                                              SUBJECTIVE / OVERNIGHT EVENTS:    No acute events overnight    HEADACHE[ - ]  FEVER[ - ]   CHILLS[ + ]   CHEST PAIN[ - ]   SOB[  ]   CONSTIPATION[ - ]    VOMITING[ - ]    DIARRHEA[ - ] ABD PAIN[ + ]      No Known Allergies    MEDICATIONS  (STANDING):  heparin  Injectable 5000Unit(s) SubCutaneous every 8 hours  finasteride 5milliGRAM(s) Oral daily  tamsulosin 0.4milliGRAM(s) Oral at bedtime  tiotropium 18 MICROgram(s) Capsule 1Capsule(s) Inhalation daily  ipratropium 17 MICROgram(s) HFA Inhaler 1Puff(s) Inhalation every 6 hours  pantoprazole    Tablet 40milliGRAM(s) Oral before breakfast  famotidine    Tablet 20milliGRAM(s) Oral daily  docusate sodium 100milliGRAM(s) Oral two times a day  senna 2Tablet(s) Oral at bedtime  piperacillin/tazobactam IVPB. 3.375Gram(s) IV Intermittent every 8 hours    MEDICATIONS  (PRN):  acetaminophen   Tablet 650milliGRAM(s) Oral every 6 hours PRN For Temp greater than 38.5 C (101.3 F)  acetaminophen   Tablet. 650milliGRAM(s) Oral every 6 hours PRN Mild to moderate pain      Vital Signs Last 24 Hrs  T(C): 37, Max: 38.3 (05-28 @ 15:39)  HR: 79 (79 - 108)  BP: 104/60 (101/54 - 147/85)  RR: 18 (17 - 18)  SpO2: 100% (98% - 100%)  Wt(kg): --  CAPILLARY BLOOD GLUCOSE      I&O's Summary    I & Os for current day (as of 29 May 2017 09:04)  =============================================  IN: 725 ml / OUT: 500 ml / NET: 225 ml              PHYSICAL EXAM:    GENERAL: NAD, diaphoretic  HEENT: dry mucus  CHEST/PULM: CTAB  HEART: RRR, no murmurs  ABDOMEN: Soft, NT/ND, BS(+)  EXTREMITIES:  ROM intact, strenght 5/5  PSYCH: appropriate mood and affect, no behavioral disturbance, + memory loss  NEURO: non-focal,  SKIN: no edema,     LABS:                        13.6   11.91 )-----------( 120      ( 28 May 2017 15:20 )             37.3         125<L>  |  91<L>  |  20  ----------------------------<  72  3.6   |  18<L>  |  0.95    Ca    7.2<L>      29 May 2017 05:40  Mg     1.8         TPro  7.1  /  Alb  3.1<L>  /  TBili  1.5<H>  /  DBili  x   /  AST  90<H>  /  ALT  54<H>  /  AlkPhos  113        CARDIAC MARKERS ( 28 May 2017 15:20 )  x     / < 0.06 ng/mL / 156 u/L / 8.19 ng/mL / x          Urinalysis Basic - ( 28 May 2017 15:20 )    Color: YELLOW / Appearance: TURBID / S.012 / pH: 6.0  Gluc: NEGATIVE / Ketone: NEGATIVE  / Bili: NEGATIVE / Urobili: NORMAL E.U.   Blood: MODERATE / Protein: 30 / Nitrite: NEGATIVE   Leuk Esterase: LARGE / RBC: 25-50 / WBC >50   Sq Epi: OCC / Non Sq Epi: x / Bacteria: x        MICROBIOLOGY:  Culture - Blood (17 @ 15:32)    Specimen Source: BLOOD VENOUS    Gram Stain Blood:   ***** CRITICAL RESULT *****  PERSON CALLED / READ-BACK: NEGRO BORJA RN  DATE / TIME CALLED: [id number]  CALLED BY: SHAMAR STERN^Gram Neg Rods  AFTER: 11 HOURS INCUBATION  BOTTLE: AEROBIC   ANAEROBIC BOTTLES            RADIOLOGY & ADDITIONAL TESTS:    Imaging Personally Reviewed:   CTA/P: IMPRESSION: No evidence of bowel obstruction. Assess for urinary tract   infection.    Consultant(s) Notes Reviewed:      Care Discussed with Consultants/Other Providers:

## 2017-05-29 NOTE — PHYSICAL THERAPY INITIAL EVALUATION ADULT - GENERAL OBSERVATIONS, REHAB EVAL
Patient received semi supine in bed, in NAD, alert, lethargic, able to follow commands, in NAD, daughter at bedside.

## 2017-05-29 NOTE — PROGRESS NOTE ADULT - PROBLEM SELECTOR PLAN 3
- Patient currently AAO x3, likely had AMS due to sepsis and hyponatremia, no focal findings on examination and CTH neg, therefore low concern for CVA currently  - Will monitor on neuro checks q4h for now and re-eval in AM 2/2 hyponatremia (metabolic), and sepsis (toxic_  - tx as above

## 2017-05-29 NOTE — H&P ADULT - ASSESSMENT
This is a 72M with history as above who presents to the hospital with AMS/lethargy found to have sepsis 2/2 UTI. Also with hyponatremia likely 2/2 poor PO intake and increased insensible losses from diaphoresis.

## 2017-05-29 NOTE — H&P ADULT - NSHPPHYSICALEXAM_GEN_ALL_CORE
GENERAL: No acute distress, well-developed, mildly diaphoretic  HEAD:  Atraumatic, Normocephalic  ENT: EOMI, PERRLA, conjunctiva and sclera clear, Neck supple, No JVD, moist mucosa  CHEST/LUNG: Clear to auscultation bilaterally; No wheeze, equal breath sounds bilaterally   BACK: No spinal tenderness, No CVA tenderness  HEART: Regular rate and rhythm; No murmurs, rubs, or gallops  ABDOMEN: Soft, + tenderness to palpation over the suprapubic area, Nondistended; Bowel sounds present  EXTREMITIES:  No clubbing, cyanosis, or edema  PSYCH: Nl behavior, nl affect  NEUROLOGY: AAOx3, non-focal, sensation intact ot light touch, cranial nerves intact  SKIN: Normal color, No rashes or lesions

## 2017-05-29 NOTE — CONSULT NOTE ADULT - ASSESSMENT
Pt is a 72yoM w/ PMH of BPH, gallstones, gastric ulcer (hx unclear), diverticulosis, who presented from home with AMSx10 days likely 2/2 UTI, with hypovolemic hyponatremia likely 2/2 restricted Na intake with poor PO intake.  Found to have (+) UA and 4/4 blood culture bottles positive for GNR, CTAP suggestive of urothelial enhancement.  There is no evidence of bowel obstruction.  Pt seen by MICU and deemed not a MICU candidate (5/29).      Recommend:    1. Gram negative sepsis - likely secondary to UTI.  No bowel obstruction to suggest GI source.  F/u blood cultures and urine cultures.  Agree with zosyn for now.      2.  Check repeat bcx to ensure clearance.    3.  Continue meds for BPH    4.  Outpatient Urology/Surgery f/u for management of inguinal and testicular hernias.  Serial clinical exams. Pt is a 72yoM w/ PMH of BPH, gallstones, gastric ulcer (hx unclear), diverticulosis, who presented from home with AMSx10 days likely 2/2 UTI, with hypovolemic hyponatremia likely 2/2 restricted Na intake with poor PO intake.  Found to have (+) UA and 4/4 blood culture bottles positive for GNR, CTAP suggestive of urothelial enhancement.  There is no evidence of bowel obstruction.  Pt seen by MICU and deemed not a MICU candidate (5/29).      Recommend:    1. Gram negative sepsis - likely secondary to UTI.  No bowel obstruction to suggest GI source.  F/u blood cultures and urine cultures.  Agree with zosyn for now.      2.  Check repeat bcx to ensure clearance.    3.  Continue meds for BPH    4.  Outpatient Urology/Surgery f/u for management of inguinal and testicular hernias.  Serial clinical abdominal/testicular exams.    5.  F/u chest xray.

## 2017-05-29 NOTE — PROGRESS NOTE ADULT - PROBLEM SELECTOR PLAN 1
- Patient with sepsis (febrile to 100.9, tachycardai) 2/2 UTI  - Got zosyn in ED, will c/w for now  - Follow up on BCx and UCx (Sent in ED) - with GNR bacteremia  - c/w zosyn  - f/u speciation

## 2017-05-30 DIAGNOSIS — N40.0 BENIGN PROSTATIC HYPERPLASIA WITHOUT LOWER URINARY TRACT SYMPTOMS: ICD-10-CM

## 2017-05-30 DIAGNOSIS — H92.01 OTALGIA, RIGHT EAR: ICD-10-CM

## 2017-05-30 DIAGNOSIS — A41.9 SEPSIS, UNSPECIFIED ORGANISM: ICD-10-CM

## 2017-05-30 DIAGNOSIS — I10 ESSENTIAL (PRIMARY) HYPERTENSION: ICD-10-CM

## 2017-05-30 LAB
ALBUMIN SERPL ELPH-MCNC: 2.2 G/DL — LOW (ref 3.3–5)
ALP SERPL-CCNC: 93 U/L — SIGNIFICANT CHANGE UP (ref 40–120)
ALT FLD-CCNC: 34 U/L — SIGNIFICANT CHANGE UP (ref 4–41)
AST SERPL-CCNC: 52 U/L — HIGH (ref 4–40)
BASOPHILS # BLD AUTO: 0 K/UL — SIGNIFICANT CHANGE UP (ref 0–0.2)
BASOPHILS NFR BLD AUTO: 0 % — SIGNIFICANT CHANGE UP (ref 0–2)
BILIRUB SERPL-MCNC: 1.1 MG/DL — SIGNIFICANT CHANGE UP (ref 0.2–1.2)
BUN SERPL-MCNC: 15 MG/DL — SIGNIFICANT CHANGE UP (ref 7–23)
BUN SERPL-MCNC: 15 MG/DL — SIGNIFICANT CHANGE UP (ref 7–23)
BUN SERPL-MCNC: 16 MG/DL — SIGNIFICANT CHANGE UP (ref 7–23)
CALCIUM SERPL-MCNC: 7.5 MG/DL — LOW (ref 8.4–10.5)
CALCIUM SERPL-MCNC: 7.5 MG/DL — LOW (ref 8.4–10.5)
CALCIUM SERPL-MCNC: 7.6 MG/DL — LOW (ref 8.4–10.5)
CHLORIDE SERPL-SCNC: 100 MMOL/L — SIGNIFICANT CHANGE UP (ref 98–107)
CHLORIDE SERPL-SCNC: 95 MMOL/L — LOW (ref 98–107)
CHLORIDE SERPL-SCNC: 95 MMOL/L — LOW (ref 98–107)
CHOLEST SERPL-MCNC: 112 MG/DL — LOW (ref 120–199)
CO2 SERPL-SCNC: 20 MMOL/L — LOW (ref 22–31)
CREAT SERPL-MCNC: 1.07 MG/DL — SIGNIFICANT CHANGE UP (ref 0.5–1.3)
CREAT SERPL-MCNC: 1.15 MG/DL — SIGNIFICANT CHANGE UP (ref 0.5–1.3)
CREAT SERPL-MCNC: 1.15 MG/DL — SIGNIFICANT CHANGE UP (ref 0.5–1.3)
EOSINOPHIL # BLD AUTO: 0.01 K/UL — SIGNIFICANT CHANGE UP (ref 0–0.5)
EOSINOPHIL NFR BLD AUTO: 0.1 % — SIGNIFICANT CHANGE UP (ref 0–6)
GLUCOSE SERPL-MCNC: 129 MG/DL — HIGH (ref 70–99)
GLUCOSE SERPL-MCNC: 94 MG/DL — SIGNIFICANT CHANGE UP (ref 70–99)
GLUCOSE SERPL-MCNC: 94 MG/DL — SIGNIFICANT CHANGE UP (ref 70–99)
HCT VFR BLD CALC: 32.8 % — LOW (ref 39–50)
HDLC SERPL-MCNC: 14 MG/DL — LOW (ref 35–55)
HGB BLD-MCNC: 11.5 G/DL — LOW (ref 13–17)
IMM GRANULOCYTES NFR BLD AUTO: 0.7 % — SIGNIFICANT CHANGE UP (ref 0–1.5)
LIPID PNL WITH DIRECT LDL SERPL: 60 MG/DL — SIGNIFICANT CHANGE UP
LYMPHOCYTES # BLD AUTO: 0.5 K/UL — LOW (ref 1–3.3)
LYMPHOCYTES # BLD AUTO: 5.2 % — LOW (ref 13–44)
MAGNESIUM SERPL-MCNC: 2 MG/DL — SIGNIFICANT CHANGE UP (ref 1.6–2.6)
MAGNESIUM SERPL-MCNC: 2 MG/DL — SIGNIFICANT CHANGE UP (ref 1.6–2.6)
MCHC RBC-ENTMCNC: 29.4 PG — SIGNIFICANT CHANGE UP (ref 27–34)
MCHC RBC-ENTMCNC: 35.1 % — SIGNIFICANT CHANGE UP (ref 32–36)
MCV RBC AUTO: 83.9 FL — SIGNIFICANT CHANGE UP (ref 80–100)
MONOCYTES # BLD AUTO: 0.37 K/UL — SIGNIFICANT CHANGE UP (ref 0–0.9)
MONOCYTES NFR BLD AUTO: 3.8 % — SIGNIFICANT CHANGE UP (ref 2–14)
NEUTROPHILS # BLD AUTO: 8.73 K/UL — HIGH (ref 1.8–7.4)
NEUTROPHILS NFR BLD AUTO: 90.2 % — HIGH (ref 43–77)
PHOSPHATE SERPL-MCNC: 2.7 MG/DL — SIGNIFICANT CHANGE UP (ref 2.5–4.5)
PHOSPHATE SERPL-MCNC: 2.7 MG/DL — SIGNIFICANT CHANGE UP (ref 2.5–4.5)
PLATELET # BLD AUTO: 117 K/UL — LOW (ref 150–400)
PMV BLD: 10.3 FL — SIGNIFICANT CHANGE UP (ref 7–13)
POTASSIUM SERPL-MCNC: 3.1 MMOL/L — LOW (ref 3.5–5.3)
POTASSIUM SERPL-MCNC: 3.1 MMOL/L — LOW (ref 3.5–5.3)
POTASSIUM SERPL-MCNC: 3.6 MMOL/L — SIGNIFICANT CHANGE UP (ref 3.5–5.3)
POTASSIUM SERPL-SCNC: 3.1 MMOL/L — LOW (ref 3.5–5.3)
POTASSIUM SERPL-SCNC: 3.1 MMOL/L — LOW (ref 3.5–5.3)
POTASSIUM SERPL-SCNC: 3.6 MMOL/L — SIGNIFICANT CHANGE UP (ref 3.5–5.3)
PROT SERPL-MCNC: 5.5 G/DL — LOW (ref 6–8.3)
RBC # BLD: 3.91 M/UL — LOW (ref 4.2–5.8)
RBC # FLD: 14.4 % — SIGNIFICANT CHANGE UP (ref 10.3–14.5)
SODIUM SERPL-SCNC: 129 MMOL/L — LOW (ref 135–145)
SODIUM SERPL-SCNC: 129 MMOL/L — LOW (ref 135–145)
SODIUM SERPL-SCNC: 133 MMOL/L — LOW (ref 135–145)
TRIGL SERPL-MCNC: 130 MG/DL — SIGNIFICANT CHANGE UP (ref 10–149)
WBC # BLD: 9.68 K/UL — SIGNIFICANT CHANGE UP (ref 3.8–10.5)
WBC # FLD AUTO: 9.68 K/UL — SIGNIFICANT CHANGE UP (ref 3.8–10.5)

## 2017-05-30 PROCEDURE — 76870 US EXAM SCROTUM: CPT | Mod: 26

## 2017-05-30 PROCEDURE — 71020: CPT | Mod: 26

## 2017-05-30 RX ORDER — SODIUM CHLORIDE 9 MG/ML
1000 INJECTION INTRAMUSCULAR; INTRAVENOUS; SUBCUTANEOUS
Qty: 0 | Refills: 0 | Status: DISCONTINUED | OUTPATIENT
Start: 2017-05-30 | End: 2017-05-30

## 2017-05-30 RX ORDER — SODIUM CHLORIDE 9 MG/ML
1000 INJECTION INTRAMUSCULAR; INTRAVENOUS; SUBCUTANEOUS
Qty: 0 | Refills: 0 | Status: DISCONTINUED | OUTPATIENT
Start: 2017-05-30 | End: 2017-06-01

## 2017-05-30 RX ORDER — POTASSIUM CHLORIDE 20 MEQ
40 PACKET (EA) ORAL EVERY 4 HOURS
Qty: 0 | Refills: 0 | Status: COMPLETED | OUTPATIENT
Start: 2017-05-30 | End: 2017-05-30

## 2017-05-30 RX ADMIN — FINASTERIDE 5 MILLIGRAM(S): 5 TABLET, FILM COATED ORAL at 12:24

## 2017-05-30 RX ADMIN — SODIUM CHLORIDE 75 MILLILITER(S): 9 INJECTION INTRAMUSCULAR; INTRAVENOUS; SUBCUTANEOUS at 12:23

## 2017-05-30 RX ADMIN — PIPERACILLIN AND TAZOBACTAM 25 GRAM(S): 4; .5 INJECTION, POWDER, LYOPHILIZED, FOR SOLUTION INTRAVENOUS at 22:03

## 2017-05-30 RX ADMIN — Medication 1 PUFF(S): at 22:04

## 2017-05-30 RX ADMIN — PANTOPRAZOLE SODIUM 40 MILLIGRAM(S): 20 TABLET, DELAYED RELEASE ORAL at 05:12

## 2017-05-30 RX ADMIN — Medication 100 MILLIGRAM(S): at 18:04

## 2017-05-30 RX ADMIN — Medication 40 MILLIEQUIVALENT(S): at 12:24

## 2017-05-30 RX ADMIN — HEPARIN SODIUM 5000 UNIT(S): 5000 INJECTION INTRAVENOUS; SUBCUTANEOUS at 22:04

## 2017-05-30 RX ADMIN — Medication 1 PUFF(S): at 05:12

## 2017-05-30 RX ADMIN — PIPERACILLIN AND TAZOBACTAM 25 GRAM(S): 4; .5 INJECTION, POWDER, LYOPHILIZED, FOR SOLUTION INTRAVENOUS at 14:37

## 2017-05-30 RX ADMIN — PIPERACILLIN AND TAZOBACTAM 25 GRAM(S): 4; .5 INJECTION, POWDER, LYOPHILIZED, FOR SOLUTION INTRAVENOUS at 05:10

## 2017-05-30 RX ADMIN — FAMOTIDINE 20 MILLIGRAM(S): 10 INJECTION INTRAVENOUS at 12:25

## 2017-05-30 RX ADMIN — HEPARIN SODIUM 5000 UNIT(S): 5000 INJECTION INTRAVENOUS; SUBCUTANEOUS at 14:39

## 2017-05-30 RX ADMIN — Medication 1 PUFF(S): at 18:05

## 2017-05-30 RX ADMIN — Medication 40 MILLIEQUIVALENT(S): at 18:05

## 2017-05-30 RX ADMIN — TAMSULOSIN HYDROCHLORIDE 0.4 MILLIGRAM(S): 0.4 CAPSULE ORAL at 22:04

## 2017-05-30 RX ADMIN — HEPARIN SODIUM 5000 UNIT(S): 5000 INJECTION INTRAVENOUS; SUBCUTANEOUS at 05:10

## 2017-05-30 RX ADMIN — TIOTROPIUM BROMIDE 1 CAPSULE(S): 18 CAPSULE ORAL; RESPIRATORY (INHALATION) at 12:26

## 2017-05-30 RX ADMIN — Medication 1 PUFF(S): at 12:26

## 2017-05-30 RX ADMIN — SENNA PLUS 2 TABLET(S): 8.6 TABLET ORAL at 22:04

## 2017-05-30 RX ADMIN — Medication 100 MILLIGRAM(S): at 05:10

## 2017-05-30 NOTE — DISCHARGE NOTE ADULT - COMMUNITY RESOURCES
[facility] [id number] [place] [phone]

## 2017-05-30 NOTE — PROGRESS NOTE ADULT - SUBJECTIVE AND OBJECTIVE BOX
INTERVAL HPI/OVERNIGHT EVENTS: Seen and examined with son and daughter in room. My Rt ear hurts   Vital Signs Last 24 Hrs  T(C): 37, Max: 37.9 (05- @ 14:57)  T(F): 98.6, Max: 100.2 (05-29 @ 14:57)  HR: 67 (62 - 82)  BP: 124/60 (107/60 - 134/78)  BP(mean): --  RR: 18 (18 - 18)  SpO2: 95% (95% - 97%)  I&O's Summary    I & Os for current day (as of 30 May 2017 10:52)  =============================================  IN: 0 ml / OUT: 500 ml / NET: -500 ml    MEDICATIONS  (STANDING):  heparin  Injectable 5000Unit(s) SubCutaneous every 8 hours  finasteride 5milliGRAM(s) Oral daily  tamsulosin 0.4milliGRAM(s) Oral at bedtime  tiotropium 18 MICROgram(s) Capsule 1Capsule(s) Inhalation daily  ipratropium 17 MICROgram(s) HFA Inhaler 1Puff(s) Inhalation every 6 hours  pantoprazole    Tablet 40milliGRAM(s) Oral before breakfast  famotidine    Tablet 20milliGRAM(s) Oral daily  docusate sodium 100milliGRAM(s) Oral two times a day  senna 2Tablet(s) Oral at bedtime  piperacillin/tazobactam IVPB. 3.375Gram(s) IV Intermittent every 8 hours  sodium chloride 0.9%. 1000milliLiter(s) IV Continuous <Continuous>  potassium chloride    Tablet ER 40milliEquivalent(s) Oral every 4 hours    MEDICATIONS  (PRN):  acetaminophen   Tablet 650milliGRAM(s) Oral every 6 hours PRN For Temp greater than 38.5 C (101.3 F)  acetaminophen   Tablet. 650milliGRAM(s) Oral every 6 hours PRN Mild to moderate pain    LABS:                        11.5   9.68  )-----------( 117      ( 30 May 2017 04:49 )             32.8     05-30    129<L>  |  95<L>  |  15  ----------------------------<  94  3.1<L>   |  20<L>  |  1.15    Ca    7.5<L>      30 May 2017 04:49  Phos  2.7     05-30  Mg     2.0     05-30    TPro  5.5<L>  /  Alb  2.2<L>  /  TBili  1.1  /  DBili  x   /  AST  52<H>  /  ALT  34  /  AlkPhos  93  05-30      Urinalysis Basic - ( 28 May 2017 15:20 )    Color: YELLOW / Appearance: TURBID / S.012 / pH: 6.0  Gluc: NEGATIVE / Ketone: NEGATIVE  / Bili: NEGATIVE / Urobili: NORMAL E.U.   Blood: MODERATE / Protein: 30 / Nitrite: NEGATIVE   Leuk Esterase: LARGE / RBC: 25-50 / WBC >50   Sq Epi: OCC / Non Sq Epi: x / Bacteria: x      CAPILLARY BLOOD GLUCOSE        Urinalysis Basic - ( 28 May 2017 15:20 )    Color: YELLOW / Appearance: TURBID / S.012 / pH: 6.0  Gluc: NEGATIVE / Ketone: NEGATIVE  / Bili: NEGATIVE / Urobili: NORMAL E.U.   Blood: MODERATE / Protein: 30 / Nitrite: NEGATIVE   Leuk Esterase: LARGE / RBC: 25-50 / WBC >50   Sq Epi: OCC / Non Sq Epi: x / Bacteria: x        Imaging Personally Reviewed:  [ ] YES  [ ] NO    Consultant(s) Notes Reviewed:  [x ] YES  [ ] NO    PHYSICAL EXAM:  GENERAL: NAD, well-groomed, well-developed  HEAD:  Atraumatic, Normocephalic  EYES: EOMI, PERRLA, conjunctiva and sclera clear  ENMT: No tonsillar erythema, exudates, or enlargement; Moist mucous membranes, Good dentition, No lesions  NECK: Supple, No JVD, Normal thyroid  NERVOUS SYSTEM:  Alert & Oriented X3, Good concentration; Motor Strength 5/5 B/L upper and lower extremities; DTRs 2+ intact and symmetric  CHEST/LUNG: Clear to percussion bilaterally; No rales, rhonchi, wheezing, or rubs  HEART: Regular rate and rhythm; No murmurs, rubs, or gallops  ABDOMEN: Soft, Nontender, Nondistended; Bowel sounds present  EXTREMITIES:  2+ Peripheral Pulses, No clubbing, cyanosis, or edema  LYMPH: No lymphadenopathy noted  SKIN: No rashes or lesions    Care Discussed with Consultants/Other Providers [ x] YES  [ ] NO

## 2017-05-30 NOTE — CONSULT NOTE ADULT - PROBLEM SELECTOR RECOMMENDATION 9
Steadily rising serum Na level. Today Na 129.  Hyponatremia most c/w hypovolemia hyponatremia.  Agree with IVF, can change IVF to NS at 75 cc/hr.   No need to fluid restrict at this time. d/c fluid restriction.   Agree with replete K to goal 4. Hypokalemia will worsen hyponatremia.   Trend BMP q12 hours. Goal serum Na 130-133.
- based on low Bisi <10, low serum osm and concurrent hypochloremia, patient appears to have hypovolemic hyponatremia  - would d/c hypertonic saline, start IVNS  - goal is to correct Na to 125 mEq by 3:30PM on 5/29/2017, thus repeat BMP q4h

## 2017-05-30 NOTE — DIETITIAN INITIAL EVALUATION ADULT. - OTHER INFO
Patient seen for nutrition consult regarding unintentional weight loss. Patient admitted for AMS with sepsis secondary to UTI. Per medical record had 10 days of  increasing confusion and lethargy and poor appetite/po intake for about 1 week. Per RN patient consumed breakfast. Patient reports not following any dietary restrictions. No GI distress (nausea/vomiting/diarrhea/constipation.) reports. No chewing/swallowing difficulty noted.

## 2017-05-30 NOTE — PROGRESS NOTE ADULT - ASSESSMENT
Pt is a 72yoM w/ PMH of BPH, gallstones, gastric ulcer (hx unclear), diverticulosis, who presented from home with AMSx10 days likely 2/2 UTI, with hypovolemic hyponatremia likely 2/2 restricted Na intake with poor PO intake.  Found to have (+) UA and 4/4 blood culture bottles positive for GNR, CTAP suggestive of urothelial enhancement.  There is no evidence of bowel obstruction.  Pt seen by MICU and deemed not a MICU candidate (5/29).      Recommend:    1. Gram negative sepsis - likely secondary to UTI.  No bowel obstruction to suggest GI source.  F/u blood cultures and urine cultures.  Agree with zosyn for now.      2.  Check repeat bcx to ensure clearance.    3.  Continue meds for BPH    4.  Outpatient Urology/Surgery f/u for management of inguinal and testicular hernias.  Serial clinical abdominal/testicular exams.    4a. Given context of gram negative sepsis, r/o epidymitis vs testicular abscess.  Check testicular sonogram

## 2017-05-30 NOTE — PROGRESS NOTE ADULT - SUBJECTIVE AND OBJECTIVE BOX
Infectious Diseases progress note:    Subjective:    ROS:  CONSTITUTIONAL:  Negative fever or chills, feels well  EYES:  Negative  blurry vision or double vision  CARDIOVASCULAR:  Negative for chest pain or palpitations  RESPIRATORY:  Negative for cough, wheezing, or SOB   GASTROINTESTINAL:  Negative for nausea, vomiting, diarrhea, constipation, or abdominal pain  EXTREMITIES:  No cyanosis/clubbing/edema  GENITOURINARY:  Negative frequency, urgency or dysuria  NEUROLOGIC:  No headache, confusion, dizziness, lightheadedness    Allergies    No Known Allergies    Intolerances        ANTIBIOTICS/RELEVANT:  antimicrobials  piperacillin/tazobactam IVPB. 3.375Gram(s) IV Intermittent every 8 hours    immunologic:    OTHER:  heparin  Injectable 5000Unit(s) SubCutaneous every 8 hours  acetaminophen   Tablet 650milliGRAM(s) Oral every 6 hours PRN  acetaminophen   Tablet. 650milliGRAM(s) Oral every 6 hours PRN  finasteride 5milliGRAM(s) Oral daily  tamsulosin 0.4milliGRAM(s) Oral at bedtime  tiotropium 18 MICROgram(s) Capsule 1Capsule(s) Inhalation daily  ipratropium 17 MICROgram(s) HFA Inhaler 1Puff(s) Inhalation every 6 hours  pantoprazole    Tablet 40milliGRAM(s) Oral before breakfast  famotidine    Tablet 20milliGRAM(s) Oral daily  docusate sodium 100milliGRAM(s) Oral two times a day  senna 2Tablet(s) Oral at bedtime  potassium chloride    Tablet ER 40milliEquivalent(s) Oral every 4 hours  sodium chloride 0.9%. 1000milliLiter(s) IV Continuous <Continuous>      Objective:  Vital Signs Last 24 Hrs  T(C): 37, Max: 37.9 (05-29 @ 14:57)  T(F): 98.6, Max: 100.2 (05-29 @ 14:57)  HR: 67 (62 - 82)  BP: 124/60 (107/60 - 134/78)  BP(mean): --  RR: 18 (18 - 18)  SpO2: 95% (95% - 97%)    PHYSICAL EXAM:  Constitutional:NAD  Eyes:ANA, EOMI  Ear/Nose/Throat: no oral lesion, no sinus tenderness on percussion	  Neck:no JVD, no lymphadenopathy, supple  Respiratory: CTA silvia  Cardiovascular: S1S2 RRR, no murmurs  Gastrointestinal:soft, (+) BS, no HSM  Extremities:no e/e/c        LABS:                        11.5   9.68  )-----------( 117      ( 30 May 2017 04:49 )             32.8     05-30    129<L>  |  95<L>  |  15  ----------------------------<  94  3.1<L>   |  20<L>  |  1.15    Ca    7.5<L>      30 May 2017 04:49  Phos  2.7     05-30  Mg     2.0     -30    TPro  5.5<L>  /  Alb  2.2<L>  /  TBili  1.1  /  DBili  x   /  AST  52<H>  /  ALT  34  /  AlkPhos  93  05-30      Urinalysis Basic - ( 28 May 2017 15:20 )    Color: YELLOW / Appearance: TURBID / S.012 / pH: 6.0  Gluc: NEGATIVE / Ketone: NEGATIVE  / Bili: NEGATIVE / Urobili: NORMAL E.U.   Blood: MODERATE / Protein: 30 / Nitrite: NEGATIVE   Leuk Esterase: LARGE / RBC: 25-50 / WBC >50   Sq Epi: OCC / Non Sq Epi: x / Bacteria: x                          MICROBIOLOGY:          RADIOLOGY & ADDITIONAL STUDIES:

## 2017-05-30 NOTE — DIETITIAN INITIAL EVALUATION ADULT. - NS AS NUTRI INTERV MEALS SNACK
General/healthful diet/1. Continue DASH/TLC diet which remains appropriate at this time 2. Recommend to add Ensure Enlive 240mls 2x daily (700kcal, 40g protein). to supplement diet

## 2017-05-30 NOTE — DISCHARGE NOTE ADULT - CARE PROVIDER_API CALL
Gem Mccarty  [address],  [place] [address]  Phone: [phone]  Phone: (   )    -  Fax: (   )    -

## 2017-05-30 NOTE — DISCHARGE NOTE ADULT - PROVIDER TOKENS
FREE:[LAST:[Lul],FIRST:[Gem],PHONE:[(   )    -],FAX:[(   )    -],ADDRESS:[[address] [place] [address]  Phone: [phone]]]

## 2017-05-30 NOTE — DISCHARGE NOTE ADULT - MEDICATION SUMMARY - MEDICATIONS TO TAKE
I will START or STAY ON the medications listed below when I get home from the hospital:    Proscar 5 mg oral tablet  -- 1 tab(s) by mouth once a day  -- Indication: For BPH (benign prostatic hyperplasia)    aspirin 81 mg oral delayed release tablet  -- 1 tab(s) by mouth once a day  -- Indication: For cardiovascular ppx    Flomax 0.4 mg oral capsule  -- 1 cap(s) by mouth once a day  -- Indication: For BPH (benign prostatic hyperplasia)    heparin  -- 5000 unit(s) subcutaneous every 8 hours  -- For DVT ppx  -- Indication: For DVT ppx    loratadine 10 mg oral tablet  -- 1 tab(s) by mouth once a day  -- Indication: For Allergies    Spiriva 18 mcg inhalation capsule  -- 1 cap(s) inhaled once a day  -- Indication: For Bronchospasam    Atrovent HFA 17 mcg/inh inhalation aerosol  -- 2 puff(s) inhaled 3 times a day  -- Indication: For Bronchiospasm    Ceftin 500 mg oral tablet  -- 1 tab(s) by mouth every 12 hours  to be taken until June 13  -- Indication: For Urosepsis    raNITIdine 150 mg oral capsule  -- 1 cap(s) by mouth once a day  -- Indication: For GERD    MiraLax oral powder for reconstitution  -- 1 dose(s) by mouth once a day, As Needed  -- Indication: For Constipation    Colace 100 mg oral capsule  -- 1 cap(s) by mouth 2 times a day  -- Indication: For constipation    senna 8.6 mg oral tablet  -- 1 tab(s) by mouth once a day (at bedtime)  -- Indication: For constipation    ciprofloxacin-dexamethasone 0.3%-0.1% otic suspension  -- 3 drop(s) to each affected ear 2 times a day, administer until June 9  -- Indication: For Otitis externa    Protonix 40 mg oral delayed release tablet  -- 1 tab(s) by mouth once a day  -- Indication: For GERD

## 2017-05-30 NOTE — CHART NOTE - NSCHARTNOTEFT_GEN_A_CORE
NUTRITION SERVICES     Upon Nutritional Assessment by the Registered Dietitian your patient was determined to meet criteria/ has evidence of the following diagnosis/diagnoses:  [ ] Mild Protein Calorie Malnutrition   [ ] Moderate Protein Calorie Malnutrition   [x] Severe Protein Calorie Malnutrition   [ ] Unspecified Protein Calorie Malnutrition   [ ] Underweight / BMI <19  [ ] Morbid Obesity / BMI >40    Findings as based on:  •  Comprehensive nutrition assessment and consultation   •  Calorie Counts (nutrient intake analysis)   •  Food acceptance and intake status from observations by staff  •  Follow up  •  Patient education  •  Intervention secondary to interdisciplinary rounds  •   concerns     Treatment:  The following diet has been recommended: DASH/TLC diet + Ensure Enlive 240mls 2x daily (700kcal, 40g protein).

## 2017-05-30 NOTE — DISCHARGE NOTE ADULT - PATIENT PORTAL LINK FT
“You can access the Follow[employer] Patient Portal, offered by [facility], by registering with the following website: [email or web address]”

## 2017-05-30 NOTE — DISCHARGE NOTE ADULT - PLAN OF CARE
You developed blood infection that started from your urinary tract. We treated you with antibiotics  You are discharged on Antibiotics - Ceftin 500mg to be taken every 12 hours until June 13 Temp < 100.4 continue home dose Tamsulosin and Finasteride We are holding your home Losartan since your blood pressures are vinayak without them.  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 You currently don't have any focal deficit.  Please see Spinal Surgery as outpatient to complete the work-up and management of your syrinx. - call [phone] for appointment

## 2017-05-30 NOTE — DISCHARGE NOTE ADULT - HOSPITAL COURSE
This is a 72 male with history as above who presents to the hospital with AMS/lethargy found to have sepsis fond to have UTI. Urine culture and blood culture were both positive for GNR. Urosepsis and bacteremia was treated with Zosyn.   Pt also had sever hyponatremia- 115 on admission As per family, he has been having poor feeding and drinking excessive water for suspected nephrolithiasis. Hyponatremia corrected with PO fluid restriction and hypertonic saline with care to keep correction < 10meq per 24 hrs. This is a 72 male with history as above who presents to the hospital with AMS/lethargy found to have sepsis fond to have UTI. Urine culture and blood culture were both positive for GNR. Urosepsis and bacteremia was treated with Zosyn. Culture grew E-coli and per ID recs we switched to Ceftin 500mg Q 12 for 14 days course from first negative blood culture - May 30.  Pt also had sever hyponatremia- 115 on admission As per family, he has been having poor feeding and drinking excessive water for suspected nephrolithiasis. Hyponatremia corrected with PO fluid restriction and hypertonic saline with care to keep correction < 10meq per 24 hrs.   Neuro consulted for slurred speech unresolved since admission. MRI head, repeat CT head both unremarkable for CVA. However , MRI showed signal in C spine and R mastoid (pt has r ear pain ).   Therefore ENT consulted_________, MRI C spine done ______.   PT rec Rehab This is a 72 male with history as above who presents to the hospital with AMS/lethargy found to have sepsis fond to have UTI. Urine culture and blood culture were both positive for GNR. Urosepsis and bacteremia was treated with Zosyn. Culture grew E-coli and per ID recs we switched to Ceftin 500mg Q 12 for 14 days course from first negative blood culture - May 30.  Pt also had sever hyponatremia- 115 on admission As per family, he has been having poor feeding and drinking excessive water for suspected nephrolithiasis. Hyponatremia corrected with PO fluid restriction and hypertonic saline with care to keep correction < 10meq per 24 hrs.   Neuro consulted for slurred speech unresolved since admission. MRI head, repeat CT head both unremarkable for CVA. However , MRI showed signal in C spine and R mastoid (pt has r ear pain ).   Therefore ENT consulted and started Ciprodex for R otitis externa, MRI C spine done that showing cervical Syrinx Given that pt didn't have a focal deficit, pt was recommended to follow-up C- spine MRI finding with Neurosurgery spine as outpatient   PT rec Rehab    - Pt need to f/u with ENT, phone number for appointment is provided  - Pt also needs to f/u with Neurosurgery  re- Syrinx on MRI C-spine   Pt continued to improve functionally and was stable at discharge.

## 2017-05-30 NOTE — CONSULT NOTE ADULT - PROBLEM SELECTOR RECOMMENDATION 2
Controlled BP. Hemodynamics stable. Off BP meds at this time.   Improving appetite and volume status.
- UA positive, CT a/p also suggestive of urothelial process  - blood cultures, UCx pending   - continue zosyn for now; can consider narrowing to ceftriaxone  - continue VS monitoring q4h

## 2017-05-30 NOTE — DISCHARGE NOTE ADULT - CARE PLAN
Principal Discharge DX:	UTI (urinary tract infection)  Secondary Diagnosis:	BPH (benign prostatic hyperplasia)  Secondary Diagnosis:	HTN (hypertension) Principal Discharge DX:	UTI (urinary tract infection)  Goal:	Temp < 100.4  Instructions for follow-up, activity and diet:	You developed blood infection that started from your urinary tract. We treated you with antibiotics  You are discharged on Antibiotics - Ceftin 500mg to be taken every 12 hours until June 13  Secondary Diagnosis:	BPH (benign prostatic hyperplasia)  Instructions for follow-up, activity and diet:	continue home dose Tamsulosin and Finasteride  Secondary Diagnosis:	HTN (hypertension)  Goal:	Goal BP < 140/80  Instructions for follow-up, activity and diet:	We are holding your home Losartan since your blood pressures are vinayak without them.  Please follow-up with your PCP for possibly restarting Losartan if your BP > 140/80 Principal Discharge DX:	UTI (urinary tract infection)  Goal:	Temp < 100.4  Instructions for follow-up, activity and diet:	You developed blood infection that started from your urinary tract. We treated you with antibiotics  You are discharged on Antibiotics - Ceftin 500mg to be taken every 12 hours until June 13  Secondary Diagnosis:	BPH (benign prostatic hyperplasia)  Instructions for follow-up, activity and diet:	continue home dose Tamsulosin and Finasteride  Secondary Diagnosis:	HTN (hypertension)  Goal:	Goal BP < 140/80  Instructions for follow-up, activity and diet:	We are holding your home Losartan since your blood pressures are vinayak without them.  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 You currently don't have any focal deficit.  Please see Spinal Surgery as outpatient to complete the work-up and management of your syrinx. - call [phone] for appointment Principal Discharge DX:	UTI (urinary tract infection)  Goal:	Temp < 100.4  Instructions for follow-up, activity and diet:	You developed blood infection that started from your urinary tract. We treated you with antibiotics  You are discharged on Antibiotics - Ceftin 500mg to be taken every 12 hours until June 13  Secondary Diagnosis:	BPH (benign prostatic hyperplasia)  Instructions for follow-up, activity and diet:	continue home dose Tamsulosin and Finasteride  Secondary Diagnosis:	HTN (hypertension)  Goal:	Goal BP < 140/80  Instructions for follow-up, activity and diet:	We are holding your home Losartan since your blood pressures are vinayak without them.  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 You currently don't have any focal deficit.  Please see Spinal Surgery as outpatient to complete the work-up and management of your syrinx. - call [phone] for appointment Principal Discharge DX:	UTI (urinary tract infection)  Goal:	Temp < 100.4  Instructions for follow-up, activity and diet:	You developed blood infection that started from your urinary tract. We treated you with antibiotics  You are discharged on Antibiotics - Ceftin 500mg to be taken every 12 hours until June 13  Secondary Diagnosis:	BPH (benign prostatic hyperplasia)  Instructions for follow-up, activity and diet:	continue home dose Tamsulosin and Finasteride  Secondary Diagnosis:	HTN (hypertension)  Goal:	Goal BP < 140/80  Instructions for follow-up, activity and diet:	We are holding your home Losartan since your blood pressures are vinayak without them.  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 You currently don't have any focal deficit.  Please see Spinal Surgery as outpatient to complete the work-up and management of your syrinx. - call [phone] for appointment Principal Discharge DX:	UTI (urinary tract infection)  Goal:	Temp < 100.4  Instructions for follow-up, activity and diet:	You developed blood infection that started from your urinary tract. We treated you with antibiotics  You are discharged on Antibiotics - Ceftin 500mg to be taken every 12 hours until June 13  Secondary Diagnosis:	BPH (benign prostatic hyperplasia)  Instructions for follow-up, activity and diet:	continue home dose Tamsulosin and Finasteride  Secondary Diagnosis:	HTN (hypertension)  Goal:	Goal BP < 140/80  Instructions for follow-up, activity and diet:	We are holding your home Losartan since your blood pressures are vinayak without them.  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 You currently don't have any focal deficit.  Please see Spinal Surgery as outpatient to complete the work-up and management of your syrinx. - call [phone] for appointment

## 2017-05-30 NOTE — DIETITIAN INITIAL EVALUATION ADULT. - ENERGY NEEDS
Current weight: 126 pounds (57.1kg) (5/28)  BMI: 19.7 kg/m^2   Ideal Body Weight: 148 pounds (67 kg) +/- 10%

## 2017-05-30 NOTE — PROGRESS NOTE ADULT - PROBLEM SELECTOR PLAN 1
- with GNR bacteremia  - c/w zosyn  - f/u speciation - with GNR bacteremia  - c/w zosyn  - f/u speciation & sensitivity

## 2017-05-30 NOTE — PROGRESS NOTE ADULT - PROBLEM SELECTOR PLAN 2
2/2 polydypsia c/b poor po intake and sepsis  - goal to keep at <10meq increase over 24 hrs to avoid risk of CPM  - repeat BMP Q 8.   - modify IVF prn , pt currently on NS 2/2 polydypsia c/b poor po intake and sepsis  -improving  - goal to keep at <10meq increase over 24 hrs to avoid risk of CPM  - repeat BMP Q 8.   - modify D5 NS IVF prn,

## 2017-05-30 NOTE — DISCHARGE NOTE ADULT - ADDITIONAL INSTRUCTIONS
Please call ENT for follow-up appointment [phone] upon discharge  Please call Neurosurgery for a office visit concerning Syrinx on MRI, call [phone] for an appointment			  Please follow-up with PCP on discharge from rehab  take antibiotics Ceftin as prescribed until June 13  Take ear drop Ciprodex as prescribed until June9

## 2017-05-30 NOTE — PROGRESS NOTE ADULT - SUBJECTIVE AND OBJECTIVE BOX
**MEDICINE PROGRESS NOTE**      Patient is a 72y old  Male who presents with a chief complaint of Altered mental status, lethargy (29 May 2017 00:37)                                                                            SUBJECTIVE / OVERNIGHT EVENTS:    No acute events overnight    HEADACHE[  ]  FEVER[  ]   CHILLS[  ]   CHEST PAIN[  ]   SOB[  ]   CONSTIPATION[  ]    VOMITING[  ]    DIARRHEA[  ] ABD PAIN[  ]      No Known Allergies    MEDICATIONS  (STANDING):  heparin  Injectable 5000Unit(s) SubCutaneous every 8 hours  finasteride 5milliGRAM(s) Oral daily  tamsulosin 0.4milliGRAM(s) Oral at bedtime  tiotropium 18 MICROgram(s) Capsule 1Capsule(s) Inhalation daily  ipratropium 17 MICROgram(s) HFA Inhaler 1Puff(s) Inhalation every 6 hours  pantoprazole    Tablet 40milliGRAM(s) Oral before breakfast  famotidine    Tablet 20milliGRAM(s) Oral daily  docusate sodium 100milliGRAM(s) Oral two times a day  senna 2Tablet(s) Oral at bedtime  piperacillin/tazobactam IVPB. 3.375Gram(s) IV Intermittent every 8 hours  sodium chloride 0.9%. 1000milliLiter(s) IV Continuous <Continuous>  potassium chloride    Tablet ER 40milliEquivalent(s) Oral every 4 hours    MEDICATIONS  (PRN):  acetaminophen   Tablet 650milliGRAM(s) Oral every 6 hours PRN For Temp greater than 38.5 C (101.3 F)  acetaminophen   Tablet. 650milliGRAM(s) Oral every 6 hours PRN Mild to moderate pain      Vital Signs Last 24 Hrs  T(C): 37, Max: 37.9 (05-29 @ 14:57)  HR: 67 (62 - 82)  BP: 124/60 (107/60 - 134/78)  RR: 18 (18 - 18)  SpO2: 95% (95% - 97%)  Wt(kg): --  CAPILLARY BLOOD GLUCOSE      I&O's Summary    I & Os for current day (as of 30 May 2017 08:35)  =============================================  IN: 0 ml / OUT: 500 ml / NET: -500 ml              PHYSICAL EXAM:    GENERAL: NAD  HEENT:  CHEST/PULM: CTAB  HEART: RRR, no murmurs  ABDOMEN: Soft, NT/ND, BS(+)  EXTREMITIES:  ROM intact  PSYCH: appropriate mood and affect, no behavioral disturbance  NEURO: non-focal  SKIN: no edema,    LABS:                        11.5   9.68  )-----------( 117      ( 30 May 2017 04:49 )             32.8     05-30    129<L>  |  95<L>  |  15  ----------------------------<  94  3.1<L>   |  20<L>  |  1.15    Ca    7.5<L>      30 May 2017 04:49  Phos  2.7     05-30  Mg     2.0     05-30    TPro  5.5<L>  /  Alb  2.2<L>  /  TBili  1.1  /  DBili  x   /  AST  52<H>  /  ALT  34  /  AlkPhos  93  05-30      CARDIAC MARKERS ( 28 May 2017 15:20 )  x     / < 0.06 ng/mL / 156 u/L / 8.19 ng/mL / x          Urinalysis Basic - ( 28 May 2017 15:20 )    Color: YELLOW / Appearance: TURBID / S.012 / pH: 6.0  Gluc: NEGATIVE / Ketone: NEGATIVE  / Bili: NEGATIVE / Urobili: NORMAL E.U.   Blood: MODERATE / Protein: 30 / Nitrite: NEGATIVE   Leuk Esterase: LARGE / RBC: 25-50 / WBC >50   Sq Epi: OCC / Non Sq Epi: x / Bacteria: x          Urinalysis Basic - ( 28 May 2017 15:20 )    Color: YELLOW / Appearance: TURBID / S.012 / pH: 6.0  Gluc: NEGATIVE / Ketone: NEGATIVE  / Bili: NEGATIVE / Urobili: NORMAL E.U.   Blood: MODERATE / Protein: 30 / Nitrite: NEGATIVE   Leuk Esterase: LARGE / RBC: 25-50 / WBC >50   Sq Epi: OCC / Non Sq Epi: x / Bacteria: x          RADIOLOGY & ADDITIONAL TESTS:    Imaging Personally Reviewed:    Consultant(s) Notes Reviewed:      Care Discussed with Consultants/Other Providers: **MEDICINE PROGRESS NOTE**      Patient is a 72y old  Male who presents with a chief complaint of Altered mental status, lethargy (29 May 2017 00:37)                                                                            SUBJECTIVE / OVERNIGHT EVENTS:    No acute events overnight    HEADACHE[ - ]  FEVER[ - ]   CHILLS[ - ]   CHEST PAIN[ - ]   SOB[ - ]   CONSTIPATION[ - ]    VOMITING[ - ]    DIARRHEA[ - ] ABD PAIN[ - ]      No Known Allergies    MEDICATIONS  (STANDING):  heparin  Injectable 5000Unit(s) SubCutaneous every 8 hours  finasteride 5milliGRAM(s) Oral daily  tamsulosin 0.4milliGRAM(s) Oral at bedtime  tiotropium 18 MICROgram(s) Capsule 1Capsule(s) Inhalation daily  ipratropium 17 MICROgram(s) HFA Inhaler 1Puff(s) Inhalation every 6 hours  pantoprazole    Tablet 40milliGRAM(s) Oral before breakfast  famotidine    Tablet 20milliGRAM(s) Oral daily  docusate sodium 100milliGRAM(s) Oral two times a day  senna 2Tablet(s) Oral at bedtime  piperacillin/tazobactam IVPB. 3.375Gram(s) IV Intermittent every 8 hours  sodium chloride 0.9%. 1000milliLiter(s) IV Continuous <Continuous>  potassium chloride    Tablet ER 40milliEquivalent(s) Oral every 4 hours    MEDICATIONS  (PRN):  acetaminophen   Tablet 650milliGRAM(s) Oral every 6 hours PRN For Temp greater than 38.5 C (101.3 F)  acetaminophen   Tablet. 650milliGRAM(s) Oral every 6 hours PRN Mild to moderate pain      Vital Signs Last 24 Hrs  T(C): 37, Max: 37.9 (05-29 @ 14:57)  HR: 67 (62 - 82)  BP: 124/60 (107/60 - 134/78)  RR: 18 (18 - 18)  SpO2: 95% (95% - 97%)  Wt(kg): --  CAPILLARY BLOOD GLUCOSE      I&O's Summary    I & Os for current day (as of 30 May 2017 08:35)  =============================================  IN: 0 ml / OUT: 500 ml / NET: -500 ml              PHYSICAL EXAM:    GENERAL: NAD  HEENT: mmm, PERRLA, EOMNI  CHEST/PULM: CTAB  HEART: RRR, no murmurs  ABDOMEN: Soft, NT/ND, BS(+)  EXTREMITIES:  ROM intact  PSYCH: appropriate mood and affect, no behavioral disturbance  NEURO: non-focal, AOx3  SKIN: no edema,    LABS:                        11.5   9.68  )-----------( 117      ( 30 May 2017 04:49 )             32.8     05-30    129<L>  |  95<L>  |  15  ----------------------------<  94  3.1<L>   |  20<L>  |  1.15    Ca    7.5<L>      30 May 2017 04:49  Phos  2.7     05-30  Mg     2.0     05-30    TPro  5.5<L>  /  Alb  2.2<L>  /  TBili  1.1  /  DBili  x   /  AST  52<H>  /  ALT  34  /  AlkPhos  93  05-30      CARDIAC MARKERS ( 28 May 2017 15:20 )  x     / < 0.06 ng/mL / 156 u/L / 8.19 ng/mL / x          Urinalysis Basic - ( 28 May 2017 15:20 )    Color: YELLOW / Appearance: TURBID / S.012 / pH: 6.0  Gluc: NEGATIVE / Ketone: NEGATIVE  / Bili: NEGATIVE / Urobili: NORMAL E.U.   Blood: MODERATE / Protein: 30 / Nitrite: NEGATIVE   Leuk Esterase: LARGE / RBC: 25-50 / WBC >50   Sq Epi: OCC / Non Sq Epi: x / Bacteria: x          Urinalysis Basic - ( 28 May 2017 15:20 )    Color: YELLOW / Appearance: TURBID / S.012 / pH: 6.0  Gluc: NEGATIVE / Ketone: NEGATIVE  / Bili: NEGATIVE / Urobili: NORMAL E.U.   Blood: MODERATE / Protein: 30 / Nitrite: NEGATIVE   Leuk Esterase: LARGE / RBC: 25-50 / WBC >50   Sq Epi: OCC / Non Sq Epi: x / Bacteria: x          RADIOLOGY & ADDITIONAL TESTS:    Imaging Personally Reviewed:    Consultant(s) Notes Reviewed:      Care Discussed with Consultants/Other Providers:

## 2017-05-30 NOTE — PROGRESS NOTE ADULT - SUBJECTIVE AND OBJECTIVE BOX
Infectious Diseases progress note:    Subjective:    ROS:  CONSTITUTIONAL:  Negative fever or chills, feels well  EYES:  Negative  blurry vision or double vision  CARDIOVASCULAR:  Negative for chest pain or palpitations  RESPIRATORY:  Negative for cough, wheezing, or SOB   GASTROINTESTINAL:  Negative for nausea, vomiting, diarrhea, constipation, or abdominal pain  EXTREMITIES:  No cyanosis/clubbing/edema  GENITOURINARY:  Negative frequency, urgency or dysuria  NEUROLOGIC:  No headache, confusion, dizziness, lightheadedness    Allergies    No Known Allergies    Intolerances        ANTIBIOTICS/RELEVANT:  antimicrobials  piperacillin/tazobactam IVPB. 3.375Gram(s) IV Intermittent every 8 hours    immunologic:    OTHER:  heparin  Injectable 5000Unit(s) SubCutaneous every 8 hours  acetaminophen   Tablet 650milliGRAM(s) Oral every 6 hours PRN  acetaminophen   Tablet. 650milliGRAM(s) Oral every 6 hours PRN  finasteride 5milliGRAM(s) Oral daily  tamsulosin 0.4milliGRAM(s) Oral at bedtime  tiotropium 18 MICROgram(s) Capsule 1Capsule(s) Inhalation daily  ipratropium 17 MICROgram(s) HFA Inhaler 1Puff(s) Inhalation every 6 hours  pantoprazole    Tablet 40milliGRAM(s) Oral before breakfast  famotidine    Tablet 20milliGRAM(s) Oral daily  docusate sodium 100milliGRAM(s) Oral two times a day  senna 2Tablet(s) Oral at bedtime  potassium chloride    Tablet ER 40milliEquivalent(s) Oral every 4 hours  sodium chloride 0.9%. 1000milliLiter(s) IV Continuous <Continuous>      Objective:  Vital Signs Last 24 Hrs  T(C): 37, Max: 37.9 (05-29 @ 14:57)  T(F): 98.6, Max: 100.2 (05-29 @ 14:57)  HR: 67 (62 - 82)  BP: 124/60 (107/60 - 134/78)  BP(mean): --  RR: 18 (18 - 18)  SpO2: 95% (95% - 97%)    PHYSICAL EXAM:  Constitutional:NAD  Eyes:ANA, EOMI  Ear/Nose/Throat: no oral lesion, no sinus tenderness on percussion	  Neck:no JVD, no lymphadenopathy, supple  Respiratory: CTA silvia  Cardiovascular: S1S2 RRR, no murmurs  Gastrointestinal:soft, (+) BS, no HSM  Extremities:no e/e/c        LABS:                        11.5   9.68  )-----------( 117      ( 30 May 2017 04:49 )             32.8     05-30    129<L>  |  95<L>  |  15  ----------------------------<  94  3.1<L>   |  20<L>  |  1.15    Ca    7.5<L>      30 May 2017 04:49  Phos  2.7     -  Mg     2.0         TPro  5.5<L>  /  Alb  2.2<L>  /  TBili  1.1  /  DBili  x   /  AST  52<H>  /  ALT  34  /  AlkPhos  93  30      Urinalysis Basic - ( 28 May 2017 15:20 )    Color: YELLOW / Appearance: TURBID / S.012 / pH: 6.0  Gluc: NEGATIVE / Ketone: NEGATIVE  / Bili: NEGATIVE / Urobili: NORMAL E.U.   Blood: MODERATE / Protein: 30 / Nitrite: NEGATIVE   Leuk Esterase: LARGE / RBC: 25-50 / WBC >50   Sq Epi: OCC / Non Sq Epi: x / Bacteria: x        MICROBIOLOGY:      Culture - Urine (17 @ 16:50)    Culture - Urine:   GNR^Gram Neg Rods  COLONY COUNT: > = 100,000 CFU/ML    Culture - Urine:   Insufficient growth, culture re-incubated.    Specimen Source: URINE MIDSTREAM    Culture - Blood (17 @ 15:32)    Culture - Blood:   GNRID^Gram Neg Gildardo To Be Identified    Specimen Source: BLOOD VENOUS    Gram Stain Blood:   ***** CRITICAL RESULT *****  PERSON CALLED / READ-BACK: NEGRO BORJA RN  DATE / TIME CALLED: 17 [phone]  CALLED BY: SHAMAR STERN  GNR^Gram Neg Rods  AFTER: 11 HOURS INCUBATION  BOTTLE: AEROBIC   ANAEROBIC BOTTLES  (x 2 sets)    RADIOLOGY & ADDITIONAL STUDIES:    EXAM:  RAD CHEST PA LAT        PROCEDURE DATE:  May 30 2017         INTERPRETATION:  TIME OF EXAM: May 30, 2017 at 10:18 AM.    CLINICAL INFORMATION: Evaluate for pneumonia.    TECHNIQUE: AP and lateral chest x-ray. The lateral image is limited by   motion and low lung volumes.    COMPARISON: None available.     INTERPRETATION: There is mild cardiomegaly.  The thoracic aorta is calcified.  The cosme are unremarkable.  There is mild elevation of the right hemidiaphragm with linear   atelectasis at the right base. The right lung is otherwise clear.  There is ill-defined opacity in the medial left lower lung which may be   secondary to subsegmental atelectasis and/or pneumonia.  No pleural effusion or pneumothorax is seen.      IMPRESSION:  Mild cardiomegaly.    Ill-defined medial left lower lung opacity which may be secondary to   subsegmental atelectasis and/or pneumonia. Follow-up to resolution is   recommended. Infectious Diseases progress note:    Subjective:  Feeling better.  Denies abd or scrotal pain.  No new fevers.  No burning on urination    ROS:  CONSTITUTIONAL:  Negative fever or chills, feels well  EYES:  Negative  blurry vision or double vision  CARDIOVASCULAR:  Negative for chest pain or palpitations  RESPIRATORY:  Negative for cough, wheezing, or SOB   GASTROINTESTINAL:  Negative for nausea, vomiting, diarrhea, constipation, or abdominal pain  EXTREMITIES:  No cyanosis/clubbing/edema  GENITOURINARY:  Negative frequency, urgency or dysuria  NEUROLOGIC:  No headache, confusion, dizziness, lightheadedness    Allergies    No Known Allergies    Intolerances        ANTIBIOTICS/RELEVANT:  antimicrobials  piperacillin/tazobactam IVPB. 3.375Gram(s) IV Intermittent every 8 hours    immunologic:    OTHER:  heparin  Injectable 5000Unit(s) SubCutaneous every 8 hours  acetaminophen   Tablet 650milliGRAM(s) Oral every 6 hours PRN  acetaminophen   Tablet. 650milliGRAM(s) Oral every 6 hours PRN  finasteride 5milliGRAM(s) Oral daily  tamsulosin 0.4milliGRAM(s) Oral at bedtime  tiotropium 18 MICROgram(s) Capsule 1Capsule(s) Inhalation daily  ipratropium 17 MICROgram(s) HFA Inhaler 1Puff(s) Inhalation every 6 hours  pantoprazole    Tablet 40milliGRAM(s) Oral before breakfast  famotidine    Tablet 20milliGRAM(s) Oral daily  docusate sodium 100milliGRAM(s) Oral two times a day  senna 2Tablet(s) Oral at bedtime  potassium chloride    Tablet ER 40milliEquivalent(s) Oral every 4 hours  sodium chloride 0.9%. 1000milliLiter(s) IV Continuous <Continuous>      Objective:  Vital Signs Last 24 Hrs  T(C): 37, Max: 37.9 (05-29 @ 14:57)  T(F): 98.6, Max: 100.2 (05-29 @ 14:57)  HR: 67 (62 - 82)  BP: 124/60 (107/60 - 134/78)  BP(mean): --  RR: 18 (18 - 18)  SpO2: 95% (95% - 97%)    PHYSICAL EXAM:  Constitutional:NAD  Eyes:ANA, EOMI  Ear/Nose/Throat: no oral lesion, no sinus tenderness on percussion	  Neck:no JVD, no lymphadenopathy, supple  Respiratory: CTA silvia  Cardiovascular: S1S2 RRR, no murmurs  Gastrointestinal:soft, (+) BS, no HSM  Extremities:no e/e/c        LABS:                        11.5   9.68  )-----------( 117      ( 30 May 2017 04:49 )             32.8     05-30    129<L>  |  95<L>  |  15  ----------------------------<  94  3.1<L>   |  20<L>  |  1.15    Ca    7.5<L>      30 May 2017 04:49  Phos  2.7     05-30  Mg     2.0     05-30    TPro  5.5<L>  /  Alb  2.2<L>  /  TBili  1.1  /  DBili  x   /  AST  52<H>  /  ALT  34  /  AlkPhos  93  05-30      Urinalysis Basic - ( 28 May 2017 15:20 )    Color: YELLOW / Appearance: TURBID / S.012 / pH: 6.0  Gluc: NEGATIVE / Ketone: NEGATIVE  / Bili: NEGATIVE / Urobili: NORMAL E.U.   Blood: MODERATE / Protein: 30 / Nitrite: NEGATIVE   Leuk Esterase: LARGE / RBC: 25-50 / WBC >50   Sq Epi: OCC / Non Sq Epi: x / Bacteria: x        MICROBIOLOGY:      Culture - Urine (17 @ 16:50)    Culture - Urine:   GNR^Gram Neg Rods  COLONY COUNT: > = 100,000 CFU/ML    Culture - Urine:   Insufficient growth, culture re-incubated.    Specimen Source: URINE MIDSTREAM    Culture - Blood (17 @ 15:32)    Culture - Blood:   GNRID^Gram Neg Gildardo To Be Identified    Specimen Source: BLOOD VENOUS    Gram Stain Blood:   ***** CRITICAL RESULT *****  PERSON CALLED / READ-BACK: NEGRO BORJA RN  DATE / TIME CALLED: 17  CALLED BY: SHAMAR STERN  GNR^Gram Neg Rods  AFTER: 11 HOURS INCUBATION  BOTTLE: AEROBIC   ANAEROBIC BOTTLES  (x 2 sets)    RADIOLOGY & ADDITIONAL STUDIES:    EXAM:  RAD CHEST PA LAT        PROCEDURE DATE:  May 30 2017         INTERPRETATION:  TIME OF EXAM: May 30, 2017 at 10:18 AM.    CLINICAL INFORMATION: Evaluate for pneumonia.    TECHNIQUE: AP and lateral chest x-ray. The lateral image is limited by   motion and low lung volumes.    COMPARISON: None available.     INTERPRETATION: There is mild cardiomegaly.  The thoracic aorta is calcified.  The cosme are unremarkable.  There is mild elevation of the right hemidiaphragm with linear   atelectasis at the right base. The right lung is otherwise clear.  There is ill-defined opacity in the medial left lower lung which may be   secondary to subsegmental atelectasis and/or pneumonia.  No pleural effusion or pneumothorax is seen.      IMPRESSION:  Mild cardiomegaly.    Ill-defined medial left lower lung opacity which may be secondary to   subsegmental atelectasis and/or pneumonia. Follow-up to resolution is   recommended.

## 2017-05-30 NOTE — PROGRESS NOTE ADULT - PROBLEM SELECTOR PLAN 3
2/2 hyponatremia (metabolic), and sepsis (toxic_  - tx as above 2/2 hyponatremia (metabolic), and sepsis (toxic)  - tx as above

## 2017-05-30 NOTE — PROGRESS NOTE ADULT - ASSESSMENT
Pt is a 72yoM w/ PMH of BPH, gallstones, gastric ulcer (hx unclear), diverticulosis, who presented from home with AMSx10 days likely 2/2 UTI, with hypovolemic hyponatremia likely 2/2 restricted Na intake with poor PO intake.  Found to have (+) UA and 4/4 blood culture bottles positive for GNR, CTAP suggestive of urothelial enhancement.  There is no evidence of bowel obstruction.  Pt seen by MICU and deemed not a MICU candidate (5/29).      Recommend:    1. Gram negative sepsis - likely secondary to UTI.  No bowel obstruction to suggest GI source.  F/u blood cultures and urine cultures.  Agree with zosyn for now.      2.  Check repeat bcx to ensure clearance.    3.  Continue meds for BPH    4.  Outpatient Urology/Surgery f/u for management of inguinal and testicular hernias.  Serial clinical abdominal/testicular exams.  4a. Given context of gram negative sepsis and testicular swelling, r/o epidymitis vs. testicular abscess. Check testicular sonogram.    5.  F/u chest xray.

## 2017-05-31 DIAGNOSIS — K40.90 UNILATERAL INGUINAL HERNIA, WITHOUT OBSTRUCTION OR GANGRENE, NOT SPECIFIED AS RECURRENT: ICD-10-CM

## 2017-05-31 DIAGNOSIS — H91.91 UNSPECIFIED HEARING LOSS, RIGHT EAR: ICD-10-CM

## 2017-05-31 DIAGNOSIS — N43.3 HYDROCELE, UNSPECIFIED: ICD-10-CM

## 2017-05-31 DIAGNOSIS — E83.39 OTHER DISORDERS OF PHOSPHORUS METABOLISM: ICD-10-CM

## 2017-05-31 LAB
-  AMIKACIN: SIGNIFICANT CHANGE UP
-  AMIKACIN: SIGNIFICANT CHANGE UP
-  AMPICILLIN/SULBACTAM: SIGNIFICANT CHANGE UP
-  AMPICILLIN/SULBACTAM: SIGNIFICANT CHANGE UP
-  AMPICILLIN: SIGNIFICANT CHANGE UP
-  AMPICILLIN: SIGNIFICANT CHANGE UP
-  AZTREONAM: SIGNIFICANT CHANGE UP
-  AZTREONAM: SIGNIFICANT CHANGE UP
-  CEFAZOLIN: SIGNIFICANT CHANGE UP
-  CEFAZOLIN: SIGNIFICANT CHANGE UP
-  CEFEPIME: SIGNIFICANT CHANGE UP
-  CEFEPIME: SIGNIFICANT CHANGE UP
-  CEFOXITIN: SIGNIFICANT CHANGE UP
-  CEFOXITIN: SIGNIFICANT CHANGE UP
-  CEFTAZIDIME: SIGNIFICANT CHANGE UP
-  CEFTAZIDIME: SIGNIFICANT CHANGE UP
-  CEFTRIAXONE: SIGNIFICANT CHANGE UP
-  CEFTRIAXONE: SIGNIFICANT CHANGE UP
-  CIPROFLOXACIN: SIGNIFICANT CHANGE UP
-  CIPROFLOXACIN: SIGNIFICANT CHANGE UP
-  ERTAPENEM: SIGNIFICANT CHANGE UP
-  ERTAPENEM: SIGNIFICANT CHANGE UP
-  GENTAMICIN: SIGNIFICANT CHANGE UP
-  GENTAMICIN: SIGNIFICANT CHANGE UP
-  IMIPENEM: SIGNIFICANT CHANGE UP
-  IMIPENEM: SIGNIFICANT CHANGE UP
-  LEVOFLOXACIN: SIGNIFICANT CHANGE UP
-  LEVOFLOXACIN: SIGNIFICANT CHANGE UP
-  MEROPENEM: SIGNIFICANT CHANGE UP
-  MEROPENEM: SIGNIFICANT CHANGE UP
-  NITROFURANTOIN: SIGNIFICANT CHANGE UP
-  PIPERACILLIN/TAZOBACTAM: SIGNIFICANT CHANGE UP
-  PIPERACILLIN/TAZOBACTAM: SIGNIFICANT CHANGE UP
-  TIGECYCLINE: SIGNIFICANT CHANGE UP
-  TIGECYCLINE: SIGNIFICANT CHANGE UP
-  TOBRAMYCIN: SIGNIFICANT CHANGE UP
-  TOBRAMYCIN: SIGNIFICANT CHANGE UP
-  TRIMETHOPRIM/SULFAMETHOXAZOLE: SIGNIFICANT CHANGE UP
-  TRIMETHOPRIM/SULFAMETHOXAZOLE: SIGNIFICANT CHANGE UP
BACTERIA BLD CULT: SIGNIFICANT CHANGE UP
BACTERIA UR CULT: SIGNIFICANT CHANGE UP
BUN SERPL-MCNC: 18 MG/DL — SIGNIFICANT CHANGE UP (ref 7–23)
CALCIUM SERPL-MCNC: 7.8 MG/DL — LOW (ref 8.4–10.5)
CHLORIDE SERPL-SCNC: 102 MMOL/L — SIGNIFICANT CHANGE UP (ref 98–107)
CO2 SERPL-SCNC: 22 MMOL/L — SIGNIFICANT CHANGE UP (ref 22–31)
CREAT SERPL-MCNC: 1.09 MG/DL — SIGNIFICANT CHANGE UP (ref 0.5–1.3)
GLUCOSE SERPL-MCNC: 108 MG/DL — HIGH (ref 70–99)
HCT VFR BLD CALC: 30.6 % — LOW (ref 39–50)
HGB BLD-MCNC: 10.6 G/DL — LOW (ref 13–17)
MAGNESIUM SERPL-MCNC: 1.9 MG/DL — SIGNIFICANT CHANGE UP (ref 1.6–2.6)
MCHC RBC-ENTMCNC: 29 PG — SIGNIFICANT CHANGE UP (ref 27–34)
MCHC RBC-ENTMCNC: 34.6 % — SIGNIFICANT CHANGE UP (ref 32–36)
MCV RBC AUTO: 83.8 FL — SIGNIFICANT CHANGE UP (ref 80–100)
METHOD TYPE: SIGNIFICANT CHANGE UP
METHOD TYPE: SIGNIFICANT CHANGE UP
ORGANISM # SPEC MICROSCOPIC CNT: SIGNIFICANT CHANGE UP
PHOSPHATE SERPL-MCNC: 2.1 MG/DL — LOW (ref 2.5–4.5)
PLATELET # BLD AUTO: 134 K/UL — LOW (ref 150–400)
PMV BLD: 10.3 FL — SIGNIFICANT CHANGE UP (ref 7–13)
POTASSIUM SERPL-MCNC: 3.8 MMOL/L — SIGNIFICANT CHANGE UP (ref 3.5–5.3)
POTASSIUM SERPL-SCNC: 3.8 MMOL/L — SIGNIFICANT CHANGE UP (ref 3.5–5.3)
RBC # BLD: 3.65 M/UL — LOW (ref 4.2–5.8)
RBC # FLD: 15 % — HIGH (ref 10.3–14.5)
SODIUM SERPL-SCNC: 133 MMOL/L — LOW (ref 135–145)
SPECIMEN SOURCE: SIGNIFICANT CHANGE UP
SPECIMEN SOURCE: SIGNIFICANT CHANGE UP
WBC # BLD: 8.64 K/UL — SIGNIFICANT CHANGE UP (ref 3.8–10.5)
WBC # FLD AUTO: 8.64 K/UL — SIGNIFICANT CHANGE UP (ref 3.8–10.5)

## 2017-05-31 PROCEDURE — 70450 CT HEAD/BRAIN W/O DYE: CPT | Mod: 26

## 2017-05-31 PROCEDURE — 70551 MRI BRAIN STEM W/O DYE: CPT | Mod: 26

## 2017-05-31 RX ORDER — CEFUROXIME AXETIL 250 MG
500 TABLET ORAL EVERY 12 HOURS
Qty: 0 | Refills: 0 | Status: DISCONTINUED | OUTPATIENT
Start: 2017-05-31 | End: 2017-06-02

## 2017-05-31 RX ORDER — POLYETHYLENE GLYCOL 3350 17 G/17G
17 POWDER, FOR SOLUTION ORAL DAILY
Qty: 0 | Refills: 0 | Status: DISCONTINUED | OUTPATIENT
Start: 2017-05-31 | End: 2017-06-02

## 2017-05-31 RX ORDER — SODIUM,POTASSIUM PHOSPHATES 278-250MG
1 POWDER IN PACKET (EA) ORAL
Qty: 0 | Refills: 0 | Status: COMPLETED | OUTPATIENT
Start: 2017-05-31 | End: 2017-06-01

## 2017-05-31 RX ADMIN — HEPARIN SODIUM 5000 UNIT(S): 5000 INJECTION INTRAVENOUS; SUBCUTANEOUS at 14:10

## 2017-05-31 RX ADMIN — TIOTROPIUM BROMIDE 1 CAPSULE(S): 18 CAPSULE ORAL; RESPIRATORY (INHALATION) at 10:13

## 2017-05-31 RX ADMIN — Medication 1 TABLET(S): at 21:33

## 2017-05-31 RX ADMIN — Medication 100 MILLIGRAM(S): at 18:17

## 2017-05-31 RX ADMIN — HEPARIN SODIUM 5000 UNIT(S): 5000 INJECTION INTRAVENOUS; SUBCUTANEOUS at 05:16

## 2017-05-31 RX ADMIN — TAMSULOSIN HYDROCHLORIDE 0.4 MILLIGRAM(S): 0.4 CAPSULE ORAL at 21:33

## 2017-05-31 RX ADMIN — HEPARIN SODIUM 5000 UNIT(S): 5000 INJECTION INTRAVENOUS; SUBCUTANEOUS at 21:34

## 2017-05-31 RX ADMIN — PANTOPRAZOLE SODIUM 40 MILLIGRAM(S): 20 TABLET, DELAYED RELEASE ORAL at 05:16

## 2017-05-31 RX ADMIN — Medication 1 TABLET(S): at 14:17

## 2017-05-31 RX ADMIN — Medication 500 MILLIGRAM(S): at 14:17

## 2017-05-31 RX ADMIN — Medication 1 PUFF(S): at 10:13

## 2017-05-31 RX ADMIN — PIPERACILLIN AND TAZOBACTAM 25 GRAM(S): 4; .5 INJECTION, POWDER, LYOPHILIZED, FOR SOLUTION INTRAVENOUS at 05:17

## 2017-05-31 RX ADMIN — Medication 1 PUFF(S): at 21:34

## 2017-05-31 RX ADMIN — Medication 100 MILLIGRAM(S): at 05:16

## 2017-05-31 RX ADMIN — Medication 1 TABLET(S): at 18:17

## 2017-05-31 RX ADMIN — Medication 1 PUFF(S): at 05:16

## 2017-05-31 RX ADMIN — FAMOTIDINE 20 MILLIGRAM(S): 10 INJECTION INTRAVENOUS at 14:08

## 2017-05-31 RX ADMIN — SODIUM CHLORIDE 50 MILLILITER(S): 9 INJECTION INTRAMUSCULAR; INTRAVENOUS; SUBCUTANEOUS at 05:17

## 2017-05-31 RX ADMIN — Medication 1 PUFF(S): at 18:17

## 2017-05-31 RX ADMIN — SENNA PLUS 2 TABLET(S): 8.6 TABLET ORAL at 21:33

## 2017-05-31 RX ADMIN — FINASTERIDE 5 MILLIGRAM(S): 5 TABLET, FILM COATED ORAL at 14:08

## 2017-05-31 NOTE — PROGRESS NOTE ADULT - PROBLEM SELECTOR PLAN 2
2/2 polydypsia c/b poor po intake and sepsis  -improving  - goal to keep at <10meq increase over 24 hrs to avoid risk of CPM  - repeat BMP Q 8.   - modify D5 NS IVF prn, 2/2 polydipsia c/b poor po intake and sepsis  -improving @ 133  - goal to keep at <10meq increase over 24 hrs to avoid risk of CPM  - repeat BMP Q 12.   - on NS @ 50, taper off IVF, encourage PO with fluid restriction,

## 2017-05-31 NOTE — PROGRESS NOTE ADULT - SUBJECTIVE AND OBJECTIVE BOX
Infectious Diseases progress note:    Subjective:    ROS:  CONSTITUTIONAL:  Negative fever or chills, feels well  EYES:  Negative  blurry vision or double vision  CARDIOVASCULAR:  Negative for chest pain or palpitations  RESPIRATORY:  Negative for cough, wheezing, or SOB   GASTROINTESTINAL:  Negative for nausea, vomiting, diarrhea, constipation, or abdominal pain  EXTREMITIES:  No cyanosis/clubbing/edema  GENITOURINARY:  Negative frequency, urgency or dysuria  NEUROLOGIC:  No headache, confusion, dizziness, lightheadedness    Allergies    No Known Allergies    Intolerances        ANTIBIOTICS/RELEVANT:  antimicrobials  piperacillin/tazobactam IVPB. 3.375Gram(s) IV Intermittent every 8 hours    immunologic:    OTHER:  heparin  Injectable 5000Unit(s) SubCutaneous every 8 hours  acetaminophen   Tablet 650milliGRAM(s) Oral every 6 hours PRN  acetaminophen   Tablet. 650milliGRAM(s) Oral every 6 hours PRN  finasteride 5milliGRAM(s) Oral daily  tamsulosin 0.4milliGRAM(s) Oral at bedtime  tiotropium 18 MICROgram(s) Capsule 1Capsule(s) Inhalation daily  ipratropium 17 MICROgram(s) HFA Inhaler 1Puff(s) Inhalation every 6 hours  pantoprazole    Tablet 40milliGRAM(s) Oral before breakfast  famotidine    Tablet 20milliGRAM(s) Oral daily  docusate sodium 100milliGRAM(s) Oral two times a day  senna 2Tablet(s) Oral at bedtime  sodium chloride 0.9%. 1000milliLiter(s) IV Continuous <Continuous>  potassium acid phosphate/sodium acid phosphate tablet (K-PHOS No. 2) 1Tablet(s) Oral four times a day with meals      Objective:  Vital Signs Last 24 Hrs  T(C): 37.2, Max: 37.2 (05-31 @ 05:14)  T(F): 99, Max: 99 (05-31 @ 05:14)  HR: 82 (82 - 88)  BP: 122/60 (122/60 - 128/71)  BP(mean): --  RR: 18 (17 - 18)  SpO2: 99% (98% - 99%)    PHYSICAL EXAM:  Constitutional:NAD  Eyes:ANA, EOMI  Ear/Nose/Throat: no oral lesion, no sinus tenderness on percussion	  Neck:no JVD, no lymphadenopathy, supple  Respiratory: CTA silvia  Cardiovascular: S1S2 RRR, no murmurs  Gastrointestinal:soft, (+) BS, no HSM  Extremities:no e/e/c        LABS:                        10.6   8.64  )-----------( 134      ( 31 May 2017 06:30 )             30.6     05-31    133<L>  |  102  |  18  ----------------------------<  108<H>  3.8   |  22  |  1.09    Ca    7.8<L>      31 May 2017 06:30  Phos  2.1     05-31  Mg     1.9     05-31    TPro  5.5<L>  /  Alb  2.2<L>  /  TBili  1.1  /  DBili  x   /  AST  52<H>  /  ALT  34  /  AlkPhos  93  05-30        MICROBIOLOGY:  Culture - Blood in AM (05.30.17 @ 06:50)    Culture - Blood:   NO ORGANISMS ISOLATED  NO ORGANISMS ISOLATED AT 24 HOURS    Specimen Source: BLOOD VENOUS    Culture - Urine (05.28.17 @ 16:50)    Culture - Urine:   GNR^Gram Neg Rods  COLONY COUNT: > = 100,000 CFU/ML    Culture - Urine:   Insufficient growth, culture re-incubated.    Specimen Source: URINE MIDSTREAM    Culture - Blood (05.28.17 @ 15:32)    Culture - Blood:   EC^Escherichia coli    Culture - Blood:   SEE PREVIOUS CULTURE: COLLECTED  5/28/17 RECEIVED 5/28/17  AT 15:20 [id number] FOR LAY  EC^Escherichia coli    Specimen Source: BLOOD VENOUS    Gram Stain Blood:   ***** CRITICAL RESULT *****  PERSON CALLED / READ-BACK: NEGRO BORJA RN  DATE / TIME CALLED: 05/29/17 0442  CALLED BY: SHAMAR STERN  GNR^Gram Neg Rods  AFTER: 11 HOURS INCUBATION  BOTTLE: AEROBIC   ANAEROBIC BOTTLES            RADIOLOGY & ADDITIONAL STUDIES:    EXAM:  US SCROTUM AND CONTENTS        PROCEDURE DATE:  May 30 2017         INTERPRETATION:  CLINICAL INFORMATION: 72-year-old man  status post left   inguinal hernia repair with mesh. Gram-negative sepsis. Left scrotal   swelling.    COMPARISON: None available.    TECHNIQUE: Testicular ultrasound utilizing color and spectral Doppler.    FINDINGS:    Right testis: 4.7 x 3.3 by 2.1 cm. Normal echogenicity and echotexture   with no masses or areas of architectural distortion. Normal blood flow   pattern.  Right epididymis: Not visualized.    Left testis: 4.9 x 3.2 x 3 cm. Normal echogenicity and echotexture with   no masses or areas of architectural distortion. Normal blood flow pattern.  Left epididymis: Not visualized.    Hydrocele: Moderatebilateral hydroceles.  Varicocele: None.    IMPRESSION:     Moderate bilateral hydroceles. Infectious Diseases progress note:    Subjective:  Denies fever, chills, rigors, abdominal pain or dysuria.      ROS:  CONSTITUTIONAL:  Negative fever or chills, feels well  EYES:  Negative  blurry vision or double vision  CARDIOVASCULAR:  Negative for chest pain or palpitations  RESPIRATORY:  Negative for cough, wheezing, or SOB   GASTROINTESTINAL:  Negative for nausea, vomiting, diarrhea, constipation, or abdominal pain  EXTREMITIES:  No cyanosis/clubbing/edema  GENITOURINARY:  Negative frequency, urgency or dysuria  NEUROLOGIC:  No headache, confusion, dizziness, lightheadedness    Allergies    No Known Allergies    Intolerances        ANTIBIOTICS/RELEVANT:  antimicrobials  piperacillin/tazobactam IVPB. 3.375Gram(s) IV Intermittent every 8 hours    immunologic:    OTHER:  heparin  Injectable 5000Unit(s) SubCutaneous every 8 hours  acetaminophen   Tablet 650milliGRAM(s) Oral every 6 hours PRN  acetaminophen   Tablet. 650milliGRAM(s) Oral every 6 hours PRN  finasteride 5milliGRAM(s) Oral daily  tamsulosin 0.4milliGRAM(s) Oral at bedtime  tiotropium 18 MICROgram(s) Capsule 1Capsule(s) Inhalation daily  ipratropium 17 MICROgram(s) HFA Inhaler 1Puff(s) Inhalation every 6 hours  pantoprazole    Tablet 40milliGRAM(s) Oral before breakfast  famotidine    Tablet 20milliGRAM(s) Oral daily  docusate sodium 100milliGRAM(s) Oral two times a day  senna 2Tablet(s) Oral at bedtime  sodium chloride 0.9%. 1000milliLiter(s) IV Continuous <Continuous>  potassium acid phosphate/sodium acid phosphate tablet (K-PHOS No. 2) 1Tablet(s) Oral four times a day with meals      Objective:  Vital Signs Last 24 Hrs  T(C): 37.2, Max: 37.2 (05-31 @ 05:14)  T(F): 99, Max: 99 (05-31 @ 05:14)  HR: 82 (82 - 88)  BP: 122/60 (122/60 - 128/71)  BP(mean): --  RR: 18 (17 - 18)  SpO2: 99% (98% - 99%)    PHYSICAL EXAM:  Constitutional:NAD  Eyes:ANA, EOMI  Ear/Nose/Throat: no oral lesion, no sinus tenderness on percussion	  Neck:no JVD, no lymphadenopathy, supple  Respiratory: CTA silvia  Cardiovascular: S1S2 RRR, no murmurs  Gastrointestinal:soft, (+) BS, no HSM  Extremities:no e/e/c        LABS:                        10.6   8.64  )-----------( 134      ( 31 May 2017 06:30 )             30.6     05-31    133<L>  |  102  |  18  ----------------------------<  108<H>  3.8   |  22  |  1.09    Ca    7.8<L>      31 May 2017 06:30  Phos  2.1     05-31  Mg     1.9     05-31    TPro  5.5<L>  /  Alb  2.2<L>  /  TBili  1.1  /  DBili  x   /  AST  52<H>  /  ALT  34  /  AlkPhos  93  05-30        MICROBIOLOGY:  Culture - Blood in AM (05.30.17 @ 06:50)    Culture - Blood:   NO ORGANISMS ISOLATED  NO ORGANISMS ISOLATED AT 24 HOURS    Specimen Source: BLOOD VENOUS    Culture - Urine (05.28.17 @ 16:50)    Culture - Urine:   GNR^Gram Neg Rods  COLONY COUNT: > = 100,000 CFU/ML    Culture - Urine:   Insufficient growth, culture re-incubated.    Specimen Source: URINE MIDSTREAM    Culture - Blood (05.28.17 @ 15:32)    Culture - Blood:   EC^Escherichia coli    Culture - Blood:   SEE PREVIOUS CULTURE: COLLECTED  5/28/17 RECEIVED 5/28/17  AT 15:20 [id number] FOR LAY  EC^Escherichia coli    Specimen Source: BLOOD VENOUS    Gram Stain Blood:   ***** CRITICAL RESULT *****  PERSON CALLED / READ-BACK: NEGRO BORJA RN  DATE / TIME CALLED: 05/29/17 0442  CALLED BY: SHAMAR STERN  GNR^Gram Neg Rods  AFTER: 11 HOURS INCUBATION  BOTTLE: AEROBIC   ANAEROBIC BOTTLES            RADIOLOGY & ADDITIONAL STUDIES:    EXAM:  US SCROTUM AND CONTENTS        PROCEDURE DATE:  May 30 2017         INTERPRETATION:  CLINICAL INFORMATION: 72-year-old man  status post left   inguinal hernia repair with mesh. Gram-negative sepsis. Left scrotal   swelling.    COMPARISON: None available.    TECHNIQUE: Testicular ultrasound utilizing color and spectral Doppler.    FINDINGS:    Right testis: 4.7 x 3.3 by 2.1 cm. Normal echogenicity and echotexture   with no masses or areas of architectural distortion. Normal blood flow   pattern.  Right epididymis: Not visualized.    Left testis: 4.9 x 3.2 x 3 cm. Normal echogenicity and echotexture with   no masses or areas of architectural distortion. Normal blood flow pattern.  Left epididymis: Not visualized.    Hydrocele: Moderatebilateral hydroceles.  Varicocele: None.    IMPRESSION:     Moderate bilateral hydroceles.

## 2017-05-31 NOTE — PROGRESS NOTE ADULT - SUBJECTIVE AND OBJECTIVE BOX
INTERVAL HPI/OVERNIGHT EVENTS:Feel better .  Vital Signs Last 24 Hrs  T(C): 37.2, Max: 37.2 (05-31 @ 05:14)  T(F): 99, Max: 99 (05-31 @ 05:14)  HR: 82 (82 - 88)  BP: 122/60 (122/60 - 128/71)  BP(mean): --  RR: 18 (17 - 18)  SpO2: 99% (98% - 99%)  I&O's Summary    I & Os for current day (as of 31 May 2017 10:30)  =============================================  IN: 660 ml / OUT: 2850 ml / NET: -2190 ml    MEDICATIONS  (STANDING):  heparin  Injectable 5000Unit(s) SubCutaneous every 8 hours  finasteride 5milliGRAM(s) Oral daily  tamsulosin 0.4milliGRAM(s) Oral at bedtime  tiotropium 18 MICROgram(s) Capsule 1Capsule(s) Inhalation daily  ipratropium 17 MICROgram(s) HFA Inhaler 1Puff(s) Inhalation every 6 hours  pantoprazole    Tablet 40milliGRAM(s) Oral before breakfast  famotidine    Tablet 20milliGRAM(s) Oral daily  docusate sodium 100milliGRAM(s) Oral two times a day  senna 2Tablet(s) Oral at bedtime  piperacillin/tazobactam IVPB. 3.375Gram(s) IV Intermittent every 8 hours  sodium chloride 0.9%. 1000milliLiter(s) IV Continuous <Continuous>  potassium acid phosphate/sodium acid phosphate tablet (K-PHOS No. 2) 1Tablet(s) Oral four times a day with meals    MEDICATIONS  (PRN):  acetaminophen   Tablet 650milliGRAM(s) Oral every 6 hours PRN For Temp greater than 38.5 C (101.3 F)  acetaminophen   Tablet. 650milliGRAM(s) Oral every 6 hours PRN Mild to moderate pain    LABS:                        10.6   8.64  )-----------( 134      ( 31 May 2017 06:30 )             30.6     05-31    133<L>  |  102  |  18  ----------------------------<  108<H>  3.8   |  22  |  1.09    Ca    7.8<L>      31 May 2017 06:30  Phos  2.1     05-31  Mg     1.9     05-31    TPro  5.5<L>  /  Alb  2.2<L>  /  TBili  1.1  /  DBili  x   /  AST  52<H>  /  ALT  34  /  AlkPhos  93  05-30        CAPILLARY BLOOD GLUCOSE          REVIEW OF SYSTEMS:  CONSTITUTIONAL: No fever, weight loss, or fatigue  EYES: No eye pain, visual disturbances, or discharge  ENMT:  No difficulty  tinnitus, vertigo; No sinus or throat pain but hearing loss rt ear   NECK: No pain or stiffness  BREASTS: No pain, masses, or nipple discharge  RESPIRATORY: No cough, wheezing, chills or hemoptysis; No shortness of breath  CARDIOVASCULAR: No chest pain, palpitations, dizziness, or leg swelling  GASTROINTESTINAL: No abdominal or epigastric pain. No nausea, vomiting, or hematemesis; No diarrhea or constipation. No melena or hematochezia.  GENITOURINARY: No dysuria, frequency, hematuria, or incontinence  NEUROLOGICAL: No headaches, memory loss, loss of strength, numbness, or tremors  SKIN: No itching, burning, rashes, or lesions   LYMPH NODES: No enlarged glands  ENDOCRINE: No heat or cold intolerance; No hair loss  MUSCULOSKELETAL: No joint pain or swelling; No muscle, back, or extremity pain  PSYCHIATRIC: No depression, anxiety, mood swings, or difficulty sleeping  HEME/LYMPH: No easy bruising, or bleeding gums  ALLERY AND IMMUNOLOGIC: No hives or eczema    RADIOLOGY & ADDITIONAL TESTS:    Imaging Personally Reviewed:  [ ] YES  [ ] NO    Consultant(s) Notes Reviewed:  [x ] YES  [ ] NO    PHYSICAL EXAM:  GENERAL: NAD, well-groomed, well-developed  HEAD:  Atraumatic, Normocephalic  EYES: EOMI, PERRLA, conjunctiva and sclera clear  ENMT: No tonsillar erythema, exudates, or enlargement; Moist mucous membranes, Good dentition, No lesions  NECK: Supple, No JVD, Normal thyroid  NERVOUS SYSTEM:  Alert & Oriented X3, Good concentration; Motor Strength 5/5 B/L upper and lower extremities; DTRs 2+ intact and symmetric  CHEST/LUNG: Clear to percussion bilaterally; No rales, rhonchi, wheezing, or rubs  HEART: Regular rate and rhythm; No murmurs, rubs, or gallops  ABDOMEN: Soft, Nontender, Nondistended; Bowel sounds present  EXTREMITIES:  2+ Peripheral Pulses, No clubbing, cyanosis, or edema  LYMPH: No lymphadenopathy noted  SKIN: No rashes or lesions    Care Discussed with Consultants/Other Providers [ x] YES  [ ] NO

## 2017-05-31 NOTE — PROGRESS NOTE ADULT - ASSESSMENT
This is a 72M with history as above who presents to the hospital with AMS/lethargy found to have sepsis 2/2 UTI. Also with hyponatremia likely 2/2 poor PO intake and increased insensible losses from diaphoresis. This is a 72M with history of  HTN, BPH, and inguinal hernia s/p mesh repair who presents to the hospital with AMS/lethargy found to have sepsis 2/2 UTI. Also with hyponatremia likely 2/2 poor PO intake and increased insensible losses from diaphoresis.

## 2017-05-31 NOTE — PROGRESS NOTE ADULT - ASSESSMENT
72yoM w/ PMH of BPH, gallstones, gastric ulcer, who presented with AMSx10 days, found to have UTI, blood culture bottles positive for GNR. renal consulted for low serum sodium. Hypovolemic hyponatremia likely 2/2 restricted Na intake with poor PO intake. Serum Na improving and stable, on IVF/NS. Na trend 125>129>133>133

## 2017-05-31 NOTE — CONSULT NOTE ADULT - ASSESSMENT
72M with PMH of HTN, BPH, and inguinal hernia s/p mesh repair who is brought to the hospital by his family due to altered mental status and lethargy found to be in urosepsis and hyponatremia of 115 on admission. Currently Na is 133 and sepsis seemingly resolved but patient continues to be altered and slurring speech concerning primary team of stroke.     Plan:  MRI brain w/o contrast to r/o stroke  continue to treat infection as per primary team   Can start ASA 81 unless contraindicated.

## 2017-05-31 NOTE — PROGRESS NOTE ADULT - PROBLEM SELECTOR PLAN 4
- Will hold losartan for now, BP currently acceptable s/p surgical repiar  - hydrocele on testicular U/S  - Outpatient f/u

## 2017-05-31 NOTE — PROGRESS NOTE ADULT - PROBLEM SELECTOR PLAN 3
2/2 hyponatremia (metabolic), and sepsis (toxic)  - tx as above - resolved  2/2 hyponatremia (metabolic), and sepsis (toxic)  - tx as above

## 2017-05-31 NOTE — PROGRESS NOTE ADULT - PROBLEM SELECTOR PROBLEM 6
Need for prophylactic measure Benign prostatic hyperplasia, presence of lower urinary tract symptoms unspecified, unspecified morphology

## 2017-05-31 NOTE — PROGRESS NOTE ADULT - PROBLEM SELECTOR PROBLEM 5
Benign prostatic hyperplasia, presence of lower urinary tract symptoms unspecified, unspecified morphology Essential hypertension

## 2017-05-31 NOTE — PROGRESS NOTE ADULT - ASSESSMENT
Pt is a 72yoM w/ PMH of BPH, gallstones, gastric ulcer (hx unclear), diverticulosis, who presented from home with AMSx10 days likely 2/2 UTI, with hypovolemic hyponatremia likely 2/2 restricted Na intake with poor PO intake.  Found to have (+) UA and 4/4 blood culture bottles positive for GNR, CTAP suggestive of urothelial enhancement.  There is no evidence of bowel obstruction.  Pt seen by MICU and deemed not a MICU candidate (5/29).      Recommend:    1. Gram negative sepsis - likely secondary to UTI.  No bowel obstruction to suggest GI source.  Blood cultures growing E.coli.  Sensititivities reviewed.     2.  Repeat blood cultures negative to date.    3.  Continue meds for BPH    4.  Outpatient Urology/Surgery f/u for management of inguinal and testicular hernias.  Serial clinical abdominal/testicular exams.  4a. Given context of gram negative sepsis and testicular swelling, r/o epidymitis vs. testicular abscess. Check testicular sonogram.  Results reviewed, no infectious issues.    5. Switch to ceftin 500mg po bid to complete total 2 week course from date of negative blood cultures.

## 2017-05-31 NOTE — PROGRESS NOTE ADULT - PROBLEM SELECTOR PLAN 1
- with GNR bacteremia  - c/w zosyn  - f/u speciation & sensitivity - with E-coli bacteremia  - c/w zosyn  - f/u  sensitivity

## 2017-05-31 NOTE — PROGRESS NOTE ADULT - PROBLEM SELECTOR PLAN 1
c/w IVF/NS. monitor BMP daily. avoid hypotonic fluids if able.  c/w reg diet, ensure  encouraged po intake of solid food

## 2017-05-31 NOTE — PROGRESS NOTE ADULT - SUBJECTIVE AND OBJECTIVE BOX
Patient seen and examined bedside    Subjective and Objective: No overnight events, No complaints today. feeling better. denied V/D/N/pain    Allergies: No Known Allergies    Hospital Medications:   MEDICATIONS  (STANDING):  heparin  Injectable 5000Unit(s) SubCutaneous every 8 hours  finasteride 5milliGRAM(s) Oral daily  tamsulosin 0.4milliGRAM(s) Oral at bedtime  tiotropium 18 MICROgram(s) Capsule 1Capsule(s) Inhalation daily  ipratropium 17 MICROgram(s) HFA Inhaler 1Puff(s) Inhalation every 6 hours  pantoprazole    Tablet 40milliGRAM(s) Oral before breakfast  famotidine    Tablet 20milliGRAM(s) Oral daily  docusate sodium 100milliGRAM(s) Oral two times a day  senna 2Tablet(s) Oral at bedtime  sodium chloride 0.9%. 1000milliLiter(s) IV Continuous <Continuous>  potassium acid phosphate/sodium acid phosphate tablet (K-PHOS No. 2) 1Tablet(s) Oral four times a day with meals  cefuroxime   Tablet 500milliGRAM(s) Oral every 12 hours    VITALS:  T(F): 97.6, Max: 99 ( @ 05:14)  HR: 93  BP: 131/72  RR: 17  SpO2: 98%  Wt(kg): --    I & Os for current day (as of  @ 14:58)  =============================================  IN: 660 ml / OUT: 2850 ml / NET: -2190 ml        PHYSICAL EXAM:  Constitutional: NAD  HEENT: anicteric sclera, oropharynx clear  Neck: No JVD  Respiratory: CTAB, no wheezes, rales or rhonchi  Cardiovascular: S1, S2, RRR  Gastrointestinal: BS+, soft, NT/ND  Extremities: No cyanosis or clubbing. No peripheral edema b/l  Neurological: A/O x 3, no focal deficits  Psychiatric: Normal mood, normal affect  : No CVA tenderness. No elam.   Skin: No rashes    LABS:      133<L>  |  102  |  18  ----------------------------<  108<H>  3.8   |  22  |  1.09    Ca    7.8<L>      31 May 2017 06:30  Phos  2.1       Mg     1.9         TPro  5.5<L>  /  Alb  2.2<L>  /  TBili  1.1  /  DBili      /  AST  52<H>  /  ALT  34  /  AlkPhos  93      Creatinine Trend: 1.09 <--, 1.07 <--, 1.15 <--, 1.01 <--, 1.06 <--, 0.95 <--, 1.01 <--, 1.09 <--, 1.25 <--                        10.6   8.64  )-----------( 134      ( 31 May 2017 06:30 )             30.6     Urine Studies:  Urinalysis Basic - ( 28 May 2017 15:20 )    Color: YELLOW / Appearance: TURBID / S.012 / pH: 6.0  Gluc: NEGATIVE / Ketone: NEGATIVE  / Bili: NEGATIVE / Urobili: NORMAL E.U.   Blood: MODERATE / Protein: 30 / Nitrite: NEGATIVE   Leuk Esterase: LARGE / RBC: 25-50 / WBC >50   Sq Epi: OCC / Non Sq Epi:  / Bacteria:       Sodium, Random Urine: 6 meq/L ( @ 20:11)  Potassium, Random Urine: 6.9 meq/L ( @ 20:11)  Chloride, Random Urine: 4 mmol/L ( @ 20:11)  Osmolality, Random Urine: 126 mosmo/kg ( @ 20:11)      RADIOLOGY & ADDITIONAL STUDIES:

## 2017-06-01 DIAGNOSIS — E87.6 HYPOKALEMIA: ICD-10-CM

## 2017-06-01 DIAGNOSIS — R26.81 UNSTEADINESS ON FEET: ICD-10-CM

## 2017-06-01 LAB
BUN SERPL-MCNC: 18 MG/DL — SIGNIFICANT CHANGE UP (ref 7–23)
CALCIUM SERPL-MCNC: 8.2 MG/DL — LOW (ref 8.4–10.5)
CHLORIDE SERPL-SCNC: 104 MMOL/L — SIGNIFICANT CHANGE UP (ref 98–107)
CO2 SERPL-SCNC: 23 MMOL/L — SIGNIFICANT CHANGE UP (ref 22–31)
CREAT SERPL-MCNC: 0.99 MG/DL — SIGNIFICANT CHANGE UP (ref 0.5–1.3)
GLUCOSE SERPL-MCNC: 103 MG/DL — HIGH (ref 70–99)
HCT VFR BLD CALC: 33.5 % — LOW (ref 39–50)
HGB BLD-MCNC: 11.5 G/DL — LOW (ref 13–17)
MAGNESIUM SERPL-MCNC: 1.8 MG/DL — SIGNIFICANT CHANGE UP (ref 1.6–2.6)
MCHC RBC-ENTMCNC: 29.4 PG — SIGNIFICANT CHANGE UP (ref 27–34)
MCHC RBC-ENTMCNC: 34.3 % — SIGNIFICANT CHANGE UP (ref 32–36)
MCV RBC AUTO: 85.7 FL — SIGNIFICANT CHANGE UP (ref 80–100)
PHOSPHATE SERPL-MCNC: 3 MG/DL — SIGNIFICANT CHANGE UP (ref 2.5–4.5)
PLATELET # BLD AUTO: 152 K/UL — SIGNIFICANT CHANGE UP (ref 150–400)
PMV BLD: 10.5 FL — SIGNIFICANT CHANGE UP (ref 7–13)
POTASSIUM SERPL-MCNC: 3.3 MMOL/L — LOW (ref 3.5–5.3)
POTASSIUM SERPL-SCNC: 3.3 MMOL/L — LOW (ref 3.5–5.3)
RBC # BLD: 3.91 M/UL — LOW (ref 4.2–5.8)
RBC # FLD: 15.4 % — HIGH (ref 10.3–14.5)
SODIUM SERPL-SCNC: 139 MMOL/L — SIGNIFICANT CHANGE UP (ref 135–145)
WBC # BLD: 7.37 K/UL — SIGNIFICANT CHANGE UP (ref 3.8–10.5)
WBC # FLD AUTO: 7.37 K/UL — SIGNIFICANT CHANGE UP (ref 3.8–10.5)

## 2017-06-01 RX ORDER — CEFOXITIN 1 G/1
1 INJECTION, POWDER, FOR SOLUTION INTRAVENOUS
Qty: 0 | Refills: 0 | DISCHARGE
Start: 2017-06-01 | End: 2017-06-13

## 2017-06-01 RX ORDER — HEPARIN SODIUM 5000 [USP'U]/ML
5000 INJECTION INTRAVENOUS; SUBCUTANEOUS
Qty: 0 | Refills: 0 | DISCHARGE
Start: 2017-06-01

## 2017-06-01 RX ORDER — ASPIRIN/CALCIUM CARB/MAGNESIUM 324 MG
81 TABLET ORAL DAILY
Qty: 0 | Refills: 0 | Status: DISCONTINUED | OUTPATIENT
Start: 2017-06-01 | End: 2017-06-02

## 2017-06-01 RX ORDER — ASPIRIN/CALCIUM CARB/MAGNESIUM 324 MG
1 TABLET ORAL
Qty: 0 | Refills: 0 | DISCHARGE
Start: 2017-06-01

## 2017-06-01 RX ORDER — POTASSIUM CHLORIDE 20 MEQ
40 PACKET (EA) ORAL ONCE
Qty: 0 | Refills: 0 | Status: COMPLETED | OUTPATIENT
Start: 2017-06-01 | End: 2017-06-01

## 2017-06-01 RX ADMIN — Medication 81 MILLIGRAM(S): at 15:42

## 2017-06-01 RX ADMIN — Medication 500 MILLIGRAM(S): at 06:01

## 2017-06-01 RX ADMIN — FAMOTIDINE 20 MILLIGRAM(S): 10 INJECTION INTRAVENOUS at 13:30

## 2017-06-01 RX ADMIN — Medication 1 PUFF(S): at 09:56

## 2017-06-01 RX ADMIN — SENNA PLUS 2 TABLET(S): 8.6 TABLET ORAL at 21:30

## 2017-06-01 RX ADMIN — HEPARIN SODIUM 5000 UNIT(S): 5000 INJECTION INTRAVENOUS; SUBCUTANEOUS at 06:02

## 2017-06-01 RX ADMIN — Medication 500 MILLIGRAM(S): at 17:41

## 2017-06-01 RX ADMIN — HEPARIN SODIUM 5000 UNIT(S): 5000 INJECTION INTRAVENOUS; SUBCUTANEOUS at 21:30

## 2017-06-01 RX ADMIN — Medication 100 MILLIGRAM(S): at 17:42

## 2017-06-01 RX ADMIN — Medication 1 PUFF(S): at 04:03

## 2017-06-01 RX ADMIN — Medication 100 MILLIGRAM(S): at 05:59

## 2017-06-01 RX ADMIN — Medication 1 PUFF(S): at 21:30

## 2017-06-01 RX ADMIN — HEPARIN SODIUM 5000 UNIT(S): 5000 INJECTION INTRAVENOUS; SUBCUTANEOUS at 13:30

## 2017-06-01 RX ADMIN — TIOTROPIUM BROMIDE 1 CAPSULE(S): 18 CAPSULE ORAL; RESPIRATORY (INHALATION) at 09:56

## 2017-06-01 RX ADMIN — Medication 40 MILLIEQUIVALENT(S): at 09:56

## 2017-06-01 RX ADMIN — Medication 1 PUFF(S): at 15:41

## 2017-06-01 RX ADMIN — FINASTERIDE 5 MILLIGRAM(S): 5 TABLET, FILM COATED ORAL at 13:29

## 2017-06-01 RX ADMIN — Medication 1 TABLET(S): at 09:56

## 2017-06-01 RX ADMIN — PANTOPRAZOLE SODIUM 40 MILLIGRAM(S): 20 TABLET, DELAYED RELEASE ORAL at 06:01

## 2017-06-01 NOTE — PROGRESS NOTE ADULT - ASSESSMENT
72yoM w/ PMH of BPH, gallstones, gastric ulcer, who presented with AMSx10 days, found to have UTI, blood culture bottles positive for GNR. renal consulted for low serum sodium. Hypovolemic hyponatremia likely 2/2 restricted Na intake with poor PO intake. Serum Na improving and stable.

## 2017-06-01 NOTE — PROGRESS NOTE ADULT - ASSESSMENT
This is a 72M with history of  HTN, BPH, and inguinal hernia s/p mesh repair who presents to the hospital with AMS/lethargy found to have sepsis 2/2 UTI. Also with hyponatremia likely 2/2 poor PO intake and increased insensible losses from diaphoresis.

## 2017-06-01 NOTE — CONSULT NOTE ADULT - SUBJECTIVE AND OBJECTIVE BOX
Neurology Consult    Name  TRAVIS RODRIGUEZ    HPI:  72M with PMH of HTN, BPH, and inguinal hernia s/p mesh repair who is brought to the hospital by his family due to altered mental status and lethargy for the past week to 10 days before admission. upon admission, his lab work showed him to have leukocytosis with 6% bands, hyponatremia and a positive UA. CT Head did not reveal any acute findings and CT A/P showed mild enhancement of the urothelium suggesting urosepsis. Primary team requesting neurology consult because patient has slurred speech and is still altered despite having a normal WBC and Na at 133 (initially at 115). Patient is confused and cannot provide a coherent history.     NIHSS- 4, MRS-0      MEDICATIONS  (STANDING):  heparin  Injectable 5000Unit(s) SubCutaneous every 8 hours  finasteride 5milliGRAM(s) Oral daily  tamsulosin 0.4milliGRAM(s) Oral at bedtime  tiotropium 18 MICROgram(s) Capsule 1Capsule(s) Inhalation daily  ipratropium 17 MICROgram(s) HFA Inhaler 1Puff(s) Inhalation every 6 hours  pantoprazole    Tablet 40milliGRAM(s) Oral before breakfast  famotidine    Tablet 20milliGRAM(s) Oral daily  docusate sodium 100milliGRAM(s) Oral two times a day  senna 2Tablet(s) Oral at bedtime  sodium chloride 0.9%. 1000milliLiter(s) IV Continuous <Continuous>  potassium acid phosphate/sodium acid phosphate tablet (K-PHOS No. 2) 1Tablet(s) Oral four times a day with meals  cefuroxime   Tablet 500milliGRAM(s) Oral every 12 hours    MEDICATIONS  (PRN):  acetaminophen   Tablet 650milliGRAM(s) Oral every 6 hours PRN For Temp greater than 38.5 C (101.3 F)  acetaminophen   Tablet. 650milliGRAM(s) Oral every 6 hours PRN Mild to moderate pain  polyethylene glycol 3350 17Gram(s) Oral daily PRN Constipation      Allergies    No Known Allergies    Intolerances        Objective:   Vital Signs Last 24 Hrs  T(C): 36.4, Max: 37.2 (05-31 @ 05:14)  T(F): 97.6, Max: 99 (05-31 @ 05:14)  HR: 93 (82 - 93)  BP: 131/72 (122/60 - 131/72)  BP(mean): --  RR: 17 (17 - 18)  SpO2: 98% (98% - 99%)    General Exam:   General appearance: No acute distress                   Neurological Exam:  Mental Status: AAOx1, fluent speech, follows some commands, does not repeat or name    Cranial Nerves: EOMI, PERRL, V1-V3 intact, facial symmetry intact, dysarthric    Motor: no drift in UE b/l. LLE drift (but can be poor effort)    Sensation: Intact to LT throughout    Coordination: FTN intact b/l      Labs:    05-31    133<L>  |  102  |  18  ----------------------------<  108<H>  3.8   |  22  |  1.09    Ca    7.8<L>      31 May 2017 06:30  Phos  2.1     05-31  Mg     1.9     05-31    TPro  5.5<L>  /  Alb  2.2<L>  /  TBili  1.1  /  DBili  x   /  AST  52<H>  /  ALT  34  /  AlkPhos  93  05-30    LIVER FUNCTIONS - ( 30 May 2017 04:49 )  Alb: 2.2 g/dL / Pro: 5.5 g/dL / ALK PHOS: 93 u/L / ALT: 34 u/L / AST: 52 u/L / GGT: x           CBC Full  -  ( 31 May 2017 06:30 )  WBC Count : 8.64 K/uL  Hemoglobin : 10.6 g/dL  Hematocrit : 30.6 %  Platelet Count - Automated : 134 K/uL  Mean Cell Volume : 83.8 fL  Mean Cell Hemoglobin : 29.0 pg  Mean Cell Hemoglobin Concentration : 34.6 %  Auto Neutrophil # : x  Auto Lymphocyte # : x  Auto Monocyte # : x  Auto Eosinophil # : x  Auto Basophil # : x  Auto Neutrophil % : x  Auto Lymphocyte % : x  Auto Monocyte % : x  Auto Eosinophil % : x  Auto Basophil % : x
[facility] Consult Note Nephrology - CONSULTATION NOTE    Patient is a 72y Male with HTN, brought in by family for AMS. Pt unable to provide much history, but documents reports pt was increasing confused and lethargic over past 10 days. Associated with b/l back pain. Upon admission, pt was febrile with leukocytosis. Admitting diagnosis of sepsis 2/2 UTI. +UCx and BCx, growing GNR, treated with Zosyn. Labs also significant for hyponatremia, serum Na of 119 on admission, thought 2/2 hypovolemia hyponatremia. Pt serum uptrending since admission, today na level of 129.  Currently, pt only complaining of right ear pain. Not an acute problem. Otherwise, denies SOB, pain, N/V. Tolerating meals.     PAST MEDICAL & SURGICAL HISTORY:  BPH (benign prostatic hyperplasia)  HTN (hypertension)  Abdominal hernia repair     No Known Allergies    Home Medications Reviewed  Hospital Medications:   MEDICATIONS  (STANDING):  heparin  Injectable 5000Unit(s) SubCutaneous every 8 hours  finasteride 5milliGRAM(s) Oral daily  tamsulosin 0.4milliGRAM(s) Oral at bedtime  tiotropium 18 MICROgram(s) Capsule 1Capsule(s) Inhalation daily  ipratropium 17 MICROgram(s) HFA Inhaler 1Puff(s) Inhalation every 6 hours  pantoprazole    Tablet 40milliGRAM(s) Oral before breakfast  famotidine    Tablet 20milliGRAM(s) Oral daily  docusate sodium 100milliGRAM(s) Oral two times a day  senna 2Tablet(s) Oral at bedtime  piperacillin/tazobactam IVPB. 3.375Gram(s) IV Intermittent every 8 hours  sodium chloride 0.9%. 1000milliLiter(s) IV Continuous <Continuous>  potassium chloride    Tablet ER 40milliEquivalent(s) Oral every 4 hours    SOCIAL HISTORY:  Denies ETOh,Smoking,   FAMILY HISTORY:  No pertinent family history in first degree relatives    REVIEW OF SYSTEMS:  CONSTITUTIONAL: No weakness, fevers or chills  EYES/ENT: No visual changes;  No vertigo or throat pain. Right ear pain. Denies hearing loss.    NECK: No pain or stiffness  RESPIRATORY: No cough, wheezing, hemoptysis; No shortness of breath  CARDIOVASCULAR: No chest pain or palpitations.  GASTROINTESTINAL: No abdominal or epigastric pain. No nausea, vomiting, or hematemesis; No diarrhea or constipation. No melena or hematochezia.  GENITOURINARY: No dysuria, frequency, foamy urine, urinary urgency, incontinence or hematuria  NEUROLOGICAL: No numbness or weakness  SKIN: No itching, burning, rashes, or lesions   VASCULAR: No bilateral lower extremity edema.   All other review of systems is negative unless indicated above.    VITALS:  T(F): 98.6, Max: 100.2 ( @ 14:57)  HR: 67  BP: 124/60  RR: 18  SpO2: 95%  Wt(kg): --    I & Os for current day (as of  @ 11:05)  =============================================  IN: 0 ml / OUT: 500 ml / NET: -500 ml      PHYSICAL EXAM:  Constitutional: NAD  HEENT: anicteric sclera, oropharynx clear, MMM  Neck: No JVD  Respiratory: CTAB, no wheezes, rales or rhonchi  Cardiovascular: S1, S2, RRR  Gastrointestinal: BS+, soft, NT/ND  Extremities: No cyanosis or clubbing. No peripheral edema  Neurological: A/O x 3, no focal deficits  Psychiatric: Normal mood, normal affect  : No CVA tenderness. No elam.   Skin: No rashes    LABS:      129<L>  |  95<L>  |  15  ----------------------------<  94  3.1<L>   |  20<L>  |  1.15    Ca    7.5<L>      30 May 2017 04:49  Phos  2.7       Mg     2.0         TPro  5.5<L>  /  Alb  2.2<L>  /  TBili  1.1  /  DBili      /  AST  52<H>  /  ALT  34  /  AlkPhos  93      Creatinine Trend: 1.15 <--, 1.01 <--, 1.06 <--, 0.95 <--, 1.01 <--, 1.09 <--, 1.25 <--                        11.5   9.68  )-----------( 117      ( 30 May 2017 04:49 )             32.8     Urine Studies:  Urinalysis Basic - ( 28 May 2017 15:20 )    Color: YELLOW / Appearance: TURBID / S.012 / pH: 6.0  Gluc: NEGATIVE / Ketone: NEGATIVE  / Bili: NEGATIVE / Urobili: NORMAL E.U.   Blood: MODERATE / Protein: 30 / Nitrite: NEGATIVE   Leuk Esterase: LARGE / RBC: 25-50 / WBC >50   Sq Epi: OCC / Non Sq Epi:  / Bacteria:       Sodium, Random Urine: 6 meq/L ( @ 20:11)  Potassium, Random Urine: 6.9 meq/L ( @ 20:11)  Chloride, Random Urine: 4 mmol/L ( @ 20:11)  Osmolality, Random Urine: 126 mosmo/kg ( @ 20:11)
CHIEF COMPLAINT: hyponatremia    HPI: Pt is a 72yoM w/ PMH of BPH, gallstones, gastric ulcer (hx unclear), diverticulosis, who presented from home with AMS. Pt over last 10 days had worsening mental status, confused speech, poor balance, and acute memory loss. He had episode of near fall at home, requiring assistance from his .  Of note, patient recently travelled to [facility] and returned 1 month ago. Today, he was found at home by his son, on the bathroom floor, confused.   Pt has been following healthy diet and with low Na intake and plenty of water.     In ED, pt T 98.9, HR 82, /63, RR 18 SpO2 100% on RA   ·	Given tylenol 1g IV x 1, zosyn 3.375g x 1, 2L IVNS     PAST MEDICAL & SURGICAL HISTORY:  BPH (benign prostatic hyperplasia)  HTN (hypertension)  Abdominal hernia surgery    FAMILY HISTORY: noncontributory    SOCIAL HISTORY:   - from [facility]  - retired businessman  - unknown hx of substance use    Allergies - No Known Allergies    Intolerances    HOME MEDICATIONS: Unclear if he is actually taking these medications, which were found on home medication list  colace, senna, ibuprofen, atrovent, proscar, losartan, protonix, flomax, ranitidine, loratadine, tramadol    REVIEW OF SYSTEMS:  Constitutional: Patient complains of weakness   Eyes: no vision changes  ENT: no rhinorrhea  CV: denies CP, palpitations   Resp: denies SOB, cough  GI: denies abdominal pain, N/V  : normal urine output  Integumentary: denies skin changes  Neurological:  complains of diffuse weakness     OBJECTIVE:  ICU Vital Signs Last 24 Hrs  T(C): 37.2, Max: 38.3 (- @ 15:39)  T(F): 98.9, Max: 100.9 (05- @ 15:39)  HR: 81 (81 - 100)  BP: 101/54 (101/54 - 142/78)  BP(mean): --  ABP: --  ABP(mean): --  RR: 17 (17 - 18)  SpO2: 99% (98% - 100%)        CAPILLARY BLOOD GLUCOSE    PHYSICAL EXAM:  General: NAD, frail appearing   HEENT:  PERRL EOMI, dry mucous membranes  Lymph Nodes: no cervical lymphadenopathy   Respiratory: CTAB, good inspiratory effort  Cardiovascular: rrr nS1S2, no r/m/g  Abdomen: soft, nontender, nondistended, no palpable inguinal hernia bilaterally  Extremities: warm, 2+ bilateral pulses  Skin: dry, warm   Neurological: responds to commands appropriately     HOSPITAL MEDICATIONS:  MEDICATIONS  (STANDING):  sodium chloride 0.9%. 1000milliLiter(s) IV Continuous <Continuous>    MEDICATIONS  (PRN):      LABS:                        13.6   11.91 )-----------( 120      ( 28 May 2017 15:20 )             37.3         119<LL>  |  85<L>  |  24<H>  ----------------------------<  90  3.7   |  18<L>  |  1.09    Ca    7.2<L>      28 May 2017 20:54  Mg     1.8         TPro  7.1  /  Alb  3.1<L>  /  TBili  1.5<H>  /  DBili  x   /  AST  90<H>  /  ALT  54<H>  /  AlkPhos  113        Urinalysis Basic - ( 28 May 2017 15:20 )  Color: YELLOW / Appearance: TURBID / S.012 / pH: 6.0  Gluc: NEGATIVE / Ketone: NEGATIVE  / Bili: NEGATIVE / Urobili: NORMAL E.U.   Blood: MODERATE / Protein: 30 / Nitrite: NEGATIVE   Leuk Esterase: LARGE / RBC: 25-50 / WBC >50   Sq Epi: OCC / Non Sq Epi: x / Bacteria: x    Venous Blood Gas:   @ 15:20  7.44/37/< 24/24/20.8  VBG Lactate: 1.9      EXAM:  CT BRAIN    PROCEDURE DATE:  May 28 2017   INTERPRETATION:  HISTORY: Dysarthria. Loss of balance.    COMPARISON: None available.    TECHNIQUE: Axial noncontrast CT images from the skull base to the vertex   were obtained and submitted for interpretation. Coronal and sagittal   reformatted images were performed. Bone and soft tissue windows were   evaluated.    FINDINGS:     There is no acute intracranial mass-effect, hemorrhage, midline shift, or   abnormal extra-axial fluid collection. There are nonspecific foci of   decreased attenuation in the white matter likely  microvascular ischemic   change. There is mineralization of the globus pallidus.    Ventricles, sulci, and cisterns are mildly prominent consistent with   minimal volume loss.. The basal cisterns are patent.     The paranasal sinuses demonstrate scattered opacification within the   ethmoid sinuses and mucosal thickening in the maxillary sinuses. There is   sclerosis and slight opacification of the right mastoid air cells. The   calvarium is intact.     IMPRESSION:     No acute intracranial bleeding, mass effect, or evidence of an acute   territorial infarct.    EXAM:  CT ABDOMEN AND PELVIS OC IC        PROCEDURE DATE:  May 28 2017         INTERPRETATION:  CLINICAL INFORMATION: Abdominal pain.    COMPARISON: None available.    PROCEDURE:   CT of the Abdomen and Pelvis was performed with intravenous contrast.   Intravenous contrast: 90 ml Omnipaque 350. 10 ml discarded.  Oral contrast: positive contrast was administered.  Sagittal and coronal reformats were performed.    FINDINGS:    LOWER CHEST: Coronary artery and aortic valve calcifications. Trace left   pleural effusion.    LIVER: Within normal limits.  BILE DUCTS: Normal caliber.  GALLBLADDER: Probable cholelithiasis.   SPLEEN: Within normal limits.  PANCREAS: Within normal limits.  ADRENALS: Within normal limits.  KIDNEYS/URETERS: Mild enhancement of the urothelium suggesting urosepsis.   Correlation with urinalysis recommended..    BLADDER: Within normal limits.  REPRODUCTIVE ORGANS: Mildly enlarged prostate gland measuring 6 cm in   diameter.    BOWEL: No bowel obstruction.   PERITONEUM:Trace perihepatic ascites.  VESSELS: Vascular calcifications.   RETROPERITONEUM: Subcentimeter retroperitoneal lymph nodes.  ABDOMINAL WALL: There is a fat-containing left inguinal hernia.   BONES: Diffuse degenerative changes.    IMPRESSION: No evidence of bowel obstruction. Assess for urinary tract   infection.
Patient is a 72y old  Male who presents with a chief complaint of Altered mental status, lethargy (29 May 2017 00:37)      HPI:  This is a 72M with history of HTN, BPH, and inguinal hernia s/p mesh repair who is brought to the hospital by his family due to altered mental status and lethargy for the past week to 10 days. As per the family, the patient has been complaining of b/l lower back pain with radiation down to his legs which the patient said reminded him of his inguinal hernia pain. They took the patient to see his surgeon at [place] last week and while there was told that his symptoms were not due to the hernia and was given a referral to . Over the next few days, the patient continued to deteriorate, said that he would have episodes where his speech would become garbled/incomprehensible, he would have episodes of forgetfulness, and had episodes where he would need assistance to do basic activities when previously he was completely independent. Said that today, on his day of admission, he was not responding to family and they decided to check up on him at his apartment and found him to be laying on the floor. They therefore brought him to the hospital for evaluation.    The patient himself said that his pain seemed to have started last week, said that the pain is located at both his flanks and seems to radiate down to his testicles. Said that he has also had intermittent chills/diaphoresis but denied check his temperature at home. Said that he has felt more weak than usual, said that he has also had a severely decreased appetite and has not eaten much over the past week. Said that he is having some coughing but denied having any hematuria/dysuria, denied any n/v. Denies any other symptoms at present.    In the ED, his vitals were T 98.9, Tmax 100.9, P 82, Pmax 100, /63, R 18, O2 sat 100% RA. His lab work showed him to have leukocytosis with 6% bands, hyponatremia and a positive UA. CT Head did not reveal any acute findings and CT A/P showed mild enhancement of the urothelium suggesting urosepsis. He was given NS 2L, placed on 3% NS at 60cc/hr for 4 hrs, pip/tazo 3.375g IVPB x1, and acetaminophen 1g IVPB x1. He was evaluated by MICU in ED. He was then admitted to medicine. (29 May 2017 00:37)      REVIEW OF SYSTEMS:    CONSTITUTIONAL: No fever, weight loss, or fatigue  EYES: No eye pain, visual disturbances, or discharge  ENMT:  No sore throat  NECK: No pain or stiffness  RESPIRATORY: No cough, wheezing, chills or hemoptysis; No shortness of breath  CARDIOVASCULAR: No chest pain, palpitations, dizziness, or leg swelling  GASTROINTESTINAL: No abdominal or epigastric pain. No nausea, vomiting, or hematemesis; No diarrhea or constipation. No melena or hematochezia.  GENITOURINARY: No dysuria, frequency, hematuria, or incontinence  NEUROLOGICAL: No headaches, memory loss, loss of strength, numbness, or tremors  SKIN: No itching, burning, rashes, or lesions   LYMPH NODES: No enlarged glands  MUSCULOSKELETAL: No joint pain or swelling; No muscle, back, or extremity pain      PAST MEDICAL & SURGICAL HISTORY:  BPH (benign prostatic hyperplasia)  HTN (hypertension)  Abdominal hernia  with mesh (, )      Allergies    No Known Allergies    Intolerances        FAMILY HISTORY:  No pertinent family history in first degree relatives      SOCIAL HISTORY:        MEDICATIONS  (STANDING):  heparin  Injectable 5000Unit(s) SubCutaneous every 8 hours  finasteride 5milliGRAM(s) Oral daily  tamsulosin 0.4milliGRAM(s) Oral at bedtime  tiotropium 18 MICROgram(s) Capsule 1Capsule(s) Inhalation daily  ipratropium 17 MICROgram(s) HFA Inhaler 1Puff(s) Inhalation every 6 hours  pantoprazole    Tablet 40milliGRAM(s) Oral before breakfast  famotidine    Tablet 20milliGRAM(s) Oral daily  docusate sodium 100milliGRAM(s) Oral two times a day  senna 2Tablet(s) Oral at bedtime  piperacillin/tazobactam IVPB. 3.375Gram(s) IV Intermittent every 8 hours  sodium chloride 0.9%. 1000milliLiter(s) IV Continuous <Continuous>    MEDICATIONS  (PRN):  acetaminophen   Tablet 650milliGRAM(s) Oral every 6 hours PRN For Temp greater than 38.5 C (101.3 F)  acetaminophen   Tablet. 650milliGRAM(s) Oral every 6 hours PRN Mild to moderate pain      Vital Signs Last 24 Hrs  T(C): 37.2, Max: 37.9 ( @ 14:57)  T(F): 99, Max: 100.2 ( @ 14:57)  HR: 82 (62 - 108)  BP: 107/60 (104/60 - 147/85)  BP(mean): --  RR: 18 (17 - 18)  SpO2: 97% (95% - 100%)    PHYSICAL EXAM:    GENERAL: NAD, awake  HEAD:  Atraumatic, Normocephalic  EYES: EOMI, PERRLA, conjunctiva and sclera clear  ENMT: No tonsillar erythema, exudates, or enlargement; Moist mucous membranes, Good dentition, No lesions  NECK: Supple, No JVD, Normal thyroid  NERVOUS SYSTEM:  Alert & Oriented X3, Good concentration; Motor Strength 5/5 B/L upper and lower extremities; DTRs 2+ intact and symmetric  CHEST/LUNG: Clear to percussion bilaterally; No rales, rhonchi, wheezing, or rubs  HEART: Regular rate and rhythm; No murmurs, rubs, or gallops  ABDOMEN: Soft, Nontender, Nondistended; Bowel sounds present.  ?Reducible Left inguinal hernia  EXTREMITIES:  2+ Peripheral Pulses, No clubbing, cyanosis, or edema  LYMPH: No lymphadenopathy noted  SKIN: No rashes or lesions  :  Enlarged left testicle    LABS:  CBC Full  -  ( 28 May 2017 15:20 )  WBC Count : 11.91 K/uL  Hemoglobin : 13.6 g/dL  Hematocrit : 37.3 %  Platelet Count - Automated : 120 K/uL  Mean Cell Volume : 82.5 fL  Mean Cell Hemoglobin : 30.1 pg  Mean Cell Hemoglobin Concentration : 36.5 %  Auto Neutrophil # : 11.07 K/uL  Auto Lymphocyte # : 0.32 K/uL  Auto Monocyte # : 0.39 K/uL  Auto Eosinophil # : 0.00 K/uL  Auto Basophil # : 0.00 K/uL  Auto Neutrophil % : 92.9 %  Auto Lymphocyte % : 2.7 %  Auto Monocyte % : 3.3 %  Auto Eosinophil % : 0.0 %  Auto Basophil % : 0.0 %          125<L>  |  92<L>  |  18  ----------------------------<  130<H>  3.5   |  19<L>  |  1.01    Ca    7.4<L>      29 May 2017 19:30  Mg     1.8         TPro  7.1  /  Alb  3.1<L>  /  TBili  1.5<H>  /  DBili  x   /  AST  90<H>  /  ALT  54<H>  /  AlkPhos  113        LIVER FUNCTIONS - ( 28 May 2017 15:20 )  Alb: 3.1 g/dL / Pro: 7.1 g/dL / ALK PHOS: 113 u/L / ALT: 54 u/L / AST: 90 u/L / GGT: x                     MICROBIOLOGY:    Blood Cultures:    Culture - Blood (17 @ 15:32)    Specimen Source: BLOOD VENOUS    Gram Stain Blood:   ***** CRITICAL RESULT *****  PERSON CALLED / READ-BACK: NEGRO BORJA RN  DATE / TIME CALLED: 17  CALLED BY: SHAMAR STERN  GNMEÑO^Gram Neg Rods  AFTER: 11 HOURS INCUBATION  BOTTLE: AEROBIC   ANAEROBIC BOTTLES    Culture - Blood (17 @ 15:32)    Specimen Source: BLOOD PERIPHERAL    Gram Stain Blood:   ***** CRITICAL RESULT *****  PERSON CALLED / READ-BACK: BARRINGTON BORJA  DATE / TIME CALLED: 17  CALLED BY: SHAMAR STERN^Gram Neg Rods  AFTER: 11 HOURS INCUBATION  BOTTLE: AEROBIC   ANAEROBIC BOTTLES          Urine Cultures:    Culture - Urine (17 @ 16:50)    Culture - Urine:   Insufficient growth, culture re-incubated.    Specimen Source: URINE MIDSTREAM        Urinalysis Basic - ( 28 May 2017 15:20 )    Color: YELLOW / Appearance: TURBID / S.012 / pH: 6.0  Gluc: NEGATIVE / Ketone: NEGATIVE  / Bili: NEGATIVE / Urobili: NORMAL E.U.   Blood: MODERATE / Protein: 30 / Nitrite: NEGATIVE   Leuk Esterase: LARGE / RBC: 25-50 / WBC >50   Sq Epi: OCC / Non Sq Epi: x / Bacteria: x                RADIOLOGY:    EXAM:  CT ABDOMEN AND PELVIS OC IC        PROCEDURE DATE:  May 28 2017         INTERPRETATION:  CLINICAL INFORMATION: Abdominal pain.    COMPARISON: None available.    PROCEDURE:   CT of the Abdomen and Pelvis was performed with intravenous contrast.   Intravenous contrast: 90 ml Omnipaque 350. 10 ml discarded.  Oral contrast: positive contrast was administered.  Sagittal and coronal reformats were performed.    FINDINGS:    LOWER CHEST: Coronary artery and aortic valve calcifications. Trace left   pleural effusion.    LIVER: Within normal limits.  BILE DUCTS: Normal caliber.  GALLBLADDER: Probable cholelithiasis.   SPLEEN: Within normal limits.  PANCREAS: Within normal limits.  ADRENALS: Within normal limits.  KIDNEYS/URETERS: Mild enhancement of the urothelium suggesting urosepsis.   Correlation with urinalysis recommended..    BLADDER: Within normal limits.  REPRODUCTIVE ORGANS: Mildly enlarged prostate gland measuring 6 cm in   diameter.    BOWEL: No bowel obstruction.   PERITONEUM:Trace perihepatic ascites.  VESSELS: Vascular calcifications.   RETROPERITONEUM: Subcentimeter retroperitoneal lymph nodes.  ABDOMINAL WALL: There is a fat-containing left inguinal hernia.   BONES: Diffuse degenerative changes.    IMPRESSION: No evidence of bowel obstruction. Assess for urinary tract   infection.
Reason for Consultation:  Requested by:    Patient is a 72y old  Male who presents with a chief complaint of Altered mental status, lethargy (30 May 2017 12:59)    HPI:  This is a 72M with history of HTN, BPH, and inguinal hernia s/p mesh repair who is brought to the hospital by his family due to altered mental status and lethargy for the past week to 10 days. As per the family, the patient has been complaining of b/l lower back pain with radiation down to his legs which the patient said reminded him of his inguinal hernia pain. They took the patient to see his surgeon at [place] last week and while there was told that his symptoms were not due to the hernia and was given a referral to . Over the next few days, the patient continued to deteriorate, said that he would have episodes where his speech would become garbled/incomprehensible, he would have episodes of forgetfulness, and had episodes where he would need assistance to do basic activities when previously he was completely independent. Said that today, on his day of admission, he was not responding to family and they decided to check up on him at his apartment and found him to be laying on the floor. They therefore brought him to the hospital for evaluation.    The patient himself said that his pain seemed to have started last week, said that the pain is located at both his flanks and seems to radiate down to his testicles. Said that he has also had intermittent chills/diaphoresis but denied check his temperature at home. Said that he has felt more weak than usual, said that he has also had a severely decreased appetite and has not eaten much over the past week. Said that he is having some coughing but denied having any hematuria/dysuria, denied any n/v. Denies any other symptoms at present.    In the ED, his vitals were T 98.9, Tmax 100.9, P 82, Pmax 100, /63, R 18, O2 sat 100% RA. His lab work showed him to have leukocytosis with 6% bands, hyponatremia and a positive UA. CT Head did not reveal any acute findings and CT A/P showed mild enhancement of the urothelium suggesting urosepsis. He was given NS 2L, placed on 3% NS at 60cc/hr for 4 hrs, pip/tazo 3.375g IVPB x1, and acetaminophen 1g IVPB x1. He was evaluated by MICU in ED. He was then admitted to medicine. (29 May 2017 00:37)    Patient underwent CT and MRI imaging due to AMS which showed fluid in R mastoid air cells. He complained of intermittent ear pain so ORL was consulted. At bedside pt inconsistent with history. Says he no longer has ear pain and denies any vertigo, ottorhea, tinnitus. During exam seems to have component of hearing loss. Denies recurrent ear infections, or prior ear surgery.     Birth History:  PAST MEDICAL & SURGICAL HISTORY:  BPH (benign prostatic hyperplasia)  HTN (hypertension)  Abdominal hernia    FAMILY HISTORY:  No pertinent family history in first degree relatives      MEDICATIONS  (STANDING):  heparin  Injectable 5000Unit(s) SubCutaneous every 8 hours  finasteride 5milliGRAM(s) Oral daily  tamsulosin 0.4milliGRAM(s) Oral at bedtime  tiotropium 18 MICROgram(s) Capsule 1Capsule(s) Inhalation daily  ipratropium 17 MICROgram(s) HFA Inhaler 1Puff(s) Inhalation every 6 hours  pantoprazole    Tablet 40milliGRAM(s) Oral before breakfast  famotidine    Tablet 20milliGRAM(s) Oral daily  docusate sodium 100milliGRAM(s) Oral two times a day  senna 2Tablet(s) Oral at bedtime  cefuroxime   Tablet 500milliGRAM(s) Oral every 12 hours  aspirin enteric coated 81milliGRAM(s) Oral daily    MEDICATIONS  (PRN):  acetaminophen   Tablet 650milliGRAM(s) Oral every 6 hours PRN For Temp greater than 38.5 C (101.3 F)  acetaminophen   Tablet. 650milliGRAM(s) Oral every 6 hours PRN Mild to moderate pain  polyethylene glycol 3350 17Gram(s) Oral daily PRN Constipation    Allergies    No Known Allergies    Intolerances        REVIEW OF SYSTEMS:    CONSTITUTIONAL: No fever, weight loss, or fatigue  EYES: No eye pain, visual disturbances, or discharge  ENMT:  No difficulty hearing, tinnitus, vertigo; No sinus or throat pain  NECK: No pain or stiffness  BREASTS: No pain, masses, or nipple discharge  RESPIRATORY: No cough, wheezing, chills or hemoptysis; No shortness of breath  CARDIOVASCULAR: No chest pain, palpitations, dizziness, or leg swelling  GASTROINTESTINAL: No abdominal or epigastric pain. No nausea, vomiting, or hematemesis; No diarrhea or constipation. No melena or hematochezia.  GENITOURINARY: No dysuria, frequency, hematuria, or incontinence  NEUROLOGICAL: No headaches, memory loss, loss of strength, numbness, or tremors  SKIN: No itching, burning, rashes, or lesions   LYMPH NODES: No enlarged glands  ENDOCRINE: No heat or cold intolerance; No hair loss  MUSCULOSKELETAL: No joint pain or swelling; No muscle, back, or extremity pain  PSYCHIATRIC: No depression, anxiety, mood swings, or difficulty sleeping                          11.5   7.37  )-----------( 152      ( 01 Jun 2017 06:00 )             33.5     06-01    139  |  104  |  18  ----------------------------<  103<H>  3.3<L>   |  23  |  0.99    Ca    8.2<L>      01 Jun 2017 06:00  Phos  3.0     06-01  Mg     1.8     06-01      Vital Signs Last 24 Hrs  T(C): 36.3, Max: 36.6 (06-01 @ 06:08)  T(F): 97.3, Max: 97.9 (06-01 @ 06:08)  HR: 85 (85 - 88)  BP: 120/77 (120/77 - 127/63)  BP(mean): --  RR: 17 (17 - 18)  SpO2: 100% (98% - 100%)      PHYSICAL EXAM:  awake, alert  NAD, sitting comfortably  EOMI, PERRL  NC: clear no rhinorrhea  OC/OP: clear, fom soft, tongue mobile, Post OP clear  Neck: soft, flat  AD: mastoid flat, no erythema no edema, no proptosis of pinna  pinna TTP  EAC with crusting and white debris unable to visualize TM  unable to suction 2/2 pt noncompliance with exam  AS: not performed, pt unwilling to undergo exam  CN7 grossly intact b/l    A/P: R mastoid effusion with exam consistent with AD otitis externa  -ciprodex otic drops BID 3 drops R side  -pain control  -requires cleaning of EAC for optimal exam and effect of antibiotics rec close follow up [phone] in ENT clinic upon discharge

## 2017-06-01 NOTE — PROGRESS NOTE ADULT - SUBJECTIVE AND OBJECTIVE BOX
agree with resident note, case dw resident, pt with AMS, nonfocal exam.  Plan:  MRI brain if able agree with resident note, case dw resident, pt with AMS, nonfocal exam.  Plan:  MRI brain no infarct, ? abnormal signal in C spine.    Recommend MRI C spine with contrast  Recomment ENT eval for mastoid opacification

## 2017-06-01 NOTE — PROGRESS NOTE ADULT - SUBJECTIVE AND OBJECTIVE BOX
**MEDICINE PROGRESS NOTE**      Patient is a 72y old  Male who presents with a chief complaint of Altered mental status, lethargy (30 May 2017 12:59)                                                                            SUBJECTIVE / OVERNIGHT EVENTS:    No acute events overnight    HEADACHE[  ]  FEVER[  ]   CHILLS[  ]   CHEST PAIN[  ]   SOB[  ]   CONSTIPATION[  ]    VOMITING[  ]    DIARRHEA[  ] ABD PAIN[  ]    ALLERGIES:  No Known Allergies    MEDICATIONS  (STANDING):  heparin  Injectable 5000Unit(s) SubCutaneous every 8 hours  finasteride 5milliGRAM(s) Oral daily  tamsulosin 0.4milliGRAM(s) Oral at bedtime  tiotropium 18 MICROgram(s) Capsule 1Capsule(s) Inhalation daily  ipratropium 17 MICROgram(s) HFA Inhaler 1Puff(s) Inhalation every 6 hours  pantoprazole    Tablet 40milliGRAM(s) Oral before breakfast  famotidine    Tablet 20milliGRAM(s) Oral daily  docusate sodium 100milliGRAM(s) Oral two times a day  senna 2Tablet(s) Oral at bedtime  sodium chloride 0.9%. 1000milliLiter(s) IV Continuous <Continuous>  potassium acid phosphate/sodium acid phosphate tablet (K-PHOS No. 2) 1Tablet(s) Oral four times a day with meals  cefuroxime   Tablet 500milliGRAM(s) Oral every 12 hours  potassium chloride    Tablet ER 40milliEquivalent(s) Oral once    MEDICATIONS  (PRN):  acetaminophen   Tablet 650milliGRAM(s) Oral every 6 hours PRN For Temp greater than 38.5 C (101.3 F)  acetaminophen   Tablet. 650milliGRAM(s) Oral every 6 hours PRN Mild to moderate pain  polyethylene glycol 3350 17Gram(s) Oral daily PRN Constipation      Vital Signs Last 24 Hrs  T(C): 36.6, Max: 36.6 (06-01 @ 06:08)  HR: 88 (88 - 93)  BP: 127/63 (127/63 - 131/72)  RR: 18 (17 - 18)  SpO2: 98% (98% - 98%)  Wt(kg): --  CAPILLARY BLOOD GLUCOSE      I&O's Summary    I & Os for current day (as of 01 Jun 2017 09:16)  =============================================  IN: 500 ml / OUT: 2850 ml / NET: -2350 ml              PHYSICAL EXAM:    GENERAL: NAD  HEENT:  CHEST/PULM: CTAB  HEART: RRR, no murmurs  ABDOMEN: Soft, NT/ND, BS(+)  EXTREMITIES:  ROM intact  PSYCH: appropriate mood and affect, no behavioral disturbance  NEURO: non-focal  SKIN: no edema,    LABS:                        11.5   7.37  )-----------( 152      ( 01 Jun 2017 06:00 )             33.5     06-01    139  |  104  |  18  ----------------------------<  103<H>  3.3<L>   |  23  |  0.99    Ca    8.2<L>      01 Jun 2017 06:00  Phos  3.0     06-01  Mg     1.8     06-01                  MICROBIOLOGY:  Blood Cultures    Blood Culture  NO ORGANISMS ISOLATED  NO ORGANISMS ISOLATED AT 48 HRS.   05-30 @ 06:50    Results--    Organism--    Organism ID--    Urine Culture   05-30 @ 06:50    --       Results--    Organism--    Organism ID--        RADIOLOGY & ADDITIONAL TESTS:    Imaging Personally Reviewed:    Consultant(s) Notes Reviewed:      Care Discussed with Consultants/Other Providers: **MEDICINE PROGRESS NOTE**      Patient is a 72y old  Male who presents with a chief complaint of Altered mental status, lethargy (30 May 2017 12:59)                                                                            SUBJECTIVE / OVERNIGHT EVENTS:    No acute events overnight    HEADACHE[ - ]  FEVER[ - ]   CHILLS[ - ]   CHEST PAIN[ - ]   SOB[ - ]   CONSTIPATION[ - ]    VOMITING[ - ]    DIARRHEA[ - ] ABD PAIN[ - ]    ALLERGIES:  No Known Allergies    MEDICATIONS  (STANDING):  heparin  Injectable 5000Unit(s) SubCutaneous every 8 hours  finasteride 5milliGRAM(s) Oral daily  tamsulosin 0.4milliGRAM(s) Oral at bedtime  tiotropium 18 MICROgram(s) Capsule 1Capsule(s) Inhalation daily  ipratropium 17 MICROgram(s) HFA Inhaler 1Puff(s) Inhalation every 6 hours  pantoprazole    Tablet 40milliGRAM(s) Oral before breakfast  famotidine    Tablet 20milliGRAM(s) Oral daily  docusate sodium 100milliGRAM(s) Oral two times a day  senna 2Tablet(s) Oral at bedtime  sodium chloride 0.9%. 1000milliLiter(s) IV Continuous <Continuous>  potassium acid phosphate/sodium acid phosphate tablet (K-PHOS No. 2) 1Tablet(s) Oral four times a day with meals  cefuroxime   Tablet 500milliGRAM(s) Oral every 12 hours  potassium chloride    Tablet ER 40milliEquivalent(s) Oral once    MEDICATIONS  (PRN):  acetaminophen   Tablet 650milliGRAM(s) Oral every 6 hours PRN For Temp greater than 38.5 C (101.3 F)  acetaminophen   Tablet. 650milliGRAM(s) Oral every 6 hours PRN Mild to moderate pain  polyethylene glycol 3350 17Gram(s) Oral daily PRN Constipation      Vital Signs Last 24 Hrs  T(C): 36.6, Max: 36.6 (06-01 @ 06:08)  HR: 88 (88 - 93)  BP: 127/63 (127/63 - 131/72)  RR: 18 (17 - 18)  SpO2: 98% (98% - 98%)  Wt(kg): --  CAPILLARY BLOOD GLUCOSE      I&O's Summary    I & Os for current day (as of 01 Jun 2017 09:16)  =============================================  IN: 500 ml / OUT: 2850 ml / NET: -2350 ml              PHYSICAL EXAM:    GENERAL: NAD  HEENT: mmm, EOMNI  CHEST/PULM: CTAB  HEART: RRR, no murmurs  ABDOMEN: Soft, NT/ND, BS(+)  EXTREMITIES:  ROM intact  PSYCH: a bit agitated in AM, requesting to go home but easily redirectable  NEURO: non-focal, AO x3, CN II - XII grossly intact. slurred speech improving  SKIN: no edema,    LABS:                        11.5   7.37  )-----------( 152      ( 01 Jun 2017 06:00 )             33.5     06-01    139  |  104  |  18  ----------------------------<  103<H>  3.3<L>   |  23  |  0.99    Ca    8.2<L>      01 Jun 2017 06:00  Phos  3.0     06-01  Mg     1.8     06-01                  MICROBIOLOGY:  Blood Cultures    Blood Culture  NO ORGANISMS ISOLATED  NO ORGANISMS ISOLATED AT 48 HRS.   05-30 @ 06:50                RADIOLOGY & ADDITIONAL TESTS:    Imaging Personally Reviewed:    Consultant(s) Notes Reviewed:      Care Discussed with Consultants/Other Providers:

## 2017-06-01 NOTE — PROGRESS NOTE ADULT - PROBLEM SELECTOR PLAN 7
[facility] F/U [facility] - HSQ for DVT ppx s/p surgical repiar  - hydrocele on testicular U/S  - Outpatient f/u.

## 2017-06-01 NOTE — PROGRESS NOTE ADULT - PROBLEM SELECTOR PLAN 5
Outpt f/u with [facility] - Will hold losartan for now, BP currently acceptable - resolved  2/2 hyponatremia (metabolic), and sepsis (toxic)  - unremarkable CTH and MRI head  - tx as above.   -family concerned about mental state, neuro c/s  -f/u MRI c spine   - cont to appreciate recs  - Re[facility] at [facility]

## 2017-06-01 NOTE — PROGRESS NOTE ADULT - SUBJECTIVE AND OBJECTIVE BOX
Pt seen and examined at bedside  Pt comfortable. No acute events overnight.   Denies SOB, N/V, or pain.     Allergies:  No Known Allergies    Hospital Medications:   MEDICATIONS  (STANDING):  heparin  Injectable 5000Unit(s) SubCutaneous every 8 hours  finasteride 5milliGRAM(s) Oral daily  tamsulosin 0.4milliGRAM(s) Oral at bedtime  tiotropium 18 MICROgram(s) Capsule 1Capsule(s) Inhalation daily  ipratropium 17 MICROgram(s) HFA Inhaler 1Puff(s) Inhalation every 6 hours  pantoprazole    Tablet 40milliGRAM(s) Oral before breakfast  famotidine    Tablet 20milliGRAM(s) Oral daily  docusate sodium 100milliGRAM(s) Oral two times a day  senna 2Tablet(s) Oral at bedtime  sodium chloride 0.9%. 1000milliLiter(s) IV Continuous <Continuous>  cefuroxime   Tablet 500milliGRAM(s) Oral every 12 hours  aspirin enteric coated 81milliGRAM(s) Oral daily    REVIEW OF SYSTEMS:  CONSTITUTIONAL: No weakness, fevers or chills  EYES/ENT: No visual changes;  No vertigo or throat pain   NECK: No pain or stiffness  RESPIRATORY: No cough, wheezing, hemoptysis; No shortness of breath  CARDIOVASCULAR: No chest pain or palpitations.  GASTROINTESTINAL: No abdominal or epigastric pain. No nausea, vomiting, or hematemesis; No diarrhea or constipation. No melena or hematochezia.  GENITOURINARY: No dysuria, frequency, foamy urine, urinary urgency, incontinence or hematuria  NEUROLOGICAL: No numbness or weakness  SKIN: No itching, burning, rashes, or lesions   VASCULAR: No bilateral lower extremity edema.   All other review of systems is negative unless indicated above.    VITALS:  T(F): 97.3, Max: 97.9 ( @ 06:08)  HR: 85  BP: 120/77  RR: 17  SpO2: 100%  Wt(kg): --    I & Os for current day (as of  @ 14:52)  =============================================  IN: 500 ml / OUT: 2850 ml / NET: -2350 ml      PHYSICAL EXAM:  Constitutional: NAD  HEENT: anicteric sclera, oropharynx clear, MMM  Neck: No JVD  Respiratory: CTAB, no wheezes, rales or rhonchi  Cardiovascular: S1, S2, RRR  Gastrointestinal: BS+, soft, NT/ND  Extremities: No cyanosis or clubbing. No peripheral edema  Neurological: A/O x 3, no focal deficits  Psychiatric: Normal mood, normal affect  : No CVA tenderness. No elam.   Skin: No rashes    LABS:      139  |  104  |  18  ----------------------------<  103<H>  3.3<L>   |  23  |  0.99    Ca    8.2<L>      2017 06:00  Phos  3.0       Mg     1.8           Creatinine Trend: 0.99 <--, 1.09 <--, 1.07 <--, 1.15 <--, 1.01 <--, 1.06 <--, 0.95 <--, 1.01 <--, 1.09 <--, 1.25 <--                        11.5   7.37  )-----------( 152      ( 2017 06:00 )             33.5     Urine Studies:  Urinalysis Basic - ( 28 May 2017 15:20 )    Color: YELLOW / Appearance: TURBID / S.012 / pH: 6.0  Gluc: NEGATIVE / Ketone: NEGATIVE  / Bili: NEGATIVE / Urobili: NORMAL E.U.   Blood: MODERATE / Protein: 30 / Nitrite: NEGATIVE   Leuk Esterase: LARGE / RBC: 25-50 / WBC >50   Sq Epi: OCC / Non Sq Epi:  / Bacteria:       Sodium, Random Urine: 6 meq/L ( @ 20:11)  Potassium, Random Urine: 6.9 meq/L ( @ 20:11)  Chloride, Random Urine: 4 mmol/L ( @ 20:11)  Osmolality, Random Urine: 126 mosmo/kg ( @ 20:11)

## 2017-06-01 NOTE — PROGRESS NOTE ADULT - ASSESSMENT
Pt is a 72yoM w/ PMH of BPH, gallstones, gastric ulcer (hx unclear), diverticulosis, who presented from home with AMSx10 days likely 2/2 UTI, with hypovolemic hyponatremia likely 2/2 restricted Na intake with poor PO intake.  Found to have (+) UA and 4/4 blood culture bottles positive for GNR, CTAP suggestive of urothelial enhancement.  There is no evidence of bowel obstruction.  Pt seen by MICU and deemed not a MICU candidate (5/29).      Recommend:    1. Gram negative sepsis - likely secondary to UTI.  No bowel obstruction to suggest GI source.  Blood and urine cultures growing E.coli.  Sensititivities reviewed.     2.  Repeat blood cultures negative to date.    3.  Continue meds for BPH    4.  Outpatient Urology/Surgery f/u for management of inguinal and testicular hernias.  Serial clinical abdominal/testicular exams.  4a. Given context of gram negative sepsis and testicular swelling, r/o epidymitis vs. testicular abscess. Check testicular sonogram.  Results reviewed, no infectious issues.    5. Switch to ceftin 500mg po bid to complete total 2 week course from date of negative blood cultures.

## 2017-06-01 NOTE — PROGRESS NOTE ADULT - PROBLEM SELECTOR PLAN 2
Sec to infection and Hyponatremia causing Metabolic encehalopathy no obstruction on CT ab/pelvis  c/w tamsulosin and finasteride.

## 2017-06-01 NOTE — PROGRESS NOTE ADULT - PROBLEM SELECTOR PLAN 1
Normalized serum Na. Can d/c IVF.   c/w reg diet, ensure  encouraged po intake of solid food  Check daily BMP.

## 2017-06-01 NOTE — PROGRESS NOTE ADULT - SUBJECTIVE AND OBJECTIVE BOX
INTERVAL HPI/OVERNIGHT EVENTS: Still cant walk   Vital Signs Last 24 Hrs  T(C): 36.6, Max: 36.6 (06-01 @ 06:08)  T(F): 97.9, Max: 97.9 (06-01 @ 06:08)  HR: 88 (88 - 93)  BP: 127/63 (127/63 - 131/72)  BP(mean): --  RR: 18 (17 - 18)  SpO2: 98% (98% - 98%)  I&O's Summary    I & Os for current day (as of 01 Jun 2017 13:51)  =============================================  IN: 500 ml / OUT: 2850 ml / NET: -2350 ml    MEDICATIONS  (STANDING):  heparin  Injectable 5000Unit(s) SubCutaneous every 8 hours  finasteride 5milliGRAM(s) Oral daily  tamsulosin 0.4milliGRAM(s) Oral at bedtime  tiotropium 18 MICROgram(s) Capsule 1Capsule(s) Inhalation daily  ipratropium 17 MICROgram(s) HFA Inhaler 1Puff(s) Inhalation every 6 hours  pantoprazole    Tablet 40milliGRAM(s) Oral before breakfast  famotidine    Tablet 20milliGRAM(s) Oral daily  docusate sodium 100milliGRAM(s) Oral two times a day  senna 2Tablet(s) Oral at bedtime  sodium chloride 0.9%. 1000milliLiter(s) IV Continuous <Continuous>  cefuroxime   Tablet 500milliGRAM(s) Oral every 12 hours  aspirin enteric coated 81milliGRAM(s) Oral daily    MEDICATIONS  (PRN):  acetaminophen   Tablet 650milliGRAM(s) Oral every 6 hours PRN For Temp greater than 38.5 C (101.3 F)  acetaminophen   Tablet. 650milliGRAM(s) Oral every 6 hours PRN Mild to moderate pain  polyethylene glycol 3350 17Gram(s) Oral daily PRN Constipation    LABS:                        11.5   7.37  )-----------( 152      ( 01 Jun 2017 06:00 )             33.5     06-01    139  |  104  |  18  ----------------------------<  103<H>  3.3<L>   |  23  |  0.99    Ca    8.2<L>      01 Jun 2017 06:00  Phos  3.0     06-01  Mg     1.8     06-01          CAPILLARY BLOOD GLUCOSE          REVIEW OF SYSTEMS:  CONSTITUTIONAL: No fever, weight loss, or fatigue  EYES: No eye pain, visual disturbances, or discharge  ENMT:  Rt ear  difficulty hearing, tinnitus, vertigo; No sinus or throat pain  NECK: No pain or stiffness  BREASTS: No pain, masses, or nipple discharge  RESPIRATORY: No cough, wheezing, chills or hemoptysis; No shortness of breath  CARDIOVASCULAR: No chest pain, palpitations, dizziness, or leg swelling  GASTROINTESTINAL: No abdominal or epigastric pain. No nausea, vomiting, or hematemesis; No diarrhea or constipation. No melena or hematochezia.  GENITOURINARY: No dysuria, frequency, hematuria, or incontinence  NEUROLOGICAL: unsteady on feet   SKIN: No itching, burning, rashes, or lesions   LYMPH NODES: No enlarged glands  ENDOCRINE: No heat or cold intolerance; No hair loss  MUSCULOSKELETAL: No joint pain or swelling; No muscle, back, or extremity pain  PSYCHIATRIC: No depression, anxiety, mood swings, or difficulty sleeping  HEME/LYMPH: No easy bruising, or bleeding gums  ALLERY AND IMMUNOLOGIC: No hives or eczema    RADIOLOGY & ADDITIONAL TESTS:    Imaging Personally Reviewed:  [ ] YES  [ ] NO    Consultant(s) Notes Reviewed:  [x ] YES  [ ] NO    PHYSICAL EXAM:  GENERAL: NAD, well-groomed, well-developed  HEAD:  Atraumatic, Normocephalic  EYES: EOMI, PERRLA, conjunctiva and sclera clear  NECK: Supple, No JVD, Normal thyroid  NERVOUS SYSTEM:  Unsteady gait   CHEST/LUNG: Clear to percussion bilaterally; No rales, rhonchi, wheezing, or rubs  HEART: Regular rate and rhythm; No murmurs, rubs, or gallops  ABDOMEN: Soft, Nontender, Nondistended; Bowel sounds present  EXTREMITIES:  2+ Peripheral Pulses, No clubbing, cyanosis, or edema  LYMPH: No lymphadenopathy noted  SKIN: No rashes or lesions    Care Discussed with Consultants/Other Providers [ x] YES  [ ] NO

## 2017-06-02 VITALS
TEMPERATURE: 99 F | HEART RATE: 106 BPM | OXYGEN SATURATION: 100 % | RESPIRATION RATE: 17 BRPM | DIASTOLIC BLOOD PRESSURE: 83 MMHG | SYSTOLIC BLOOD PRESSURE: 127 MMHG

## 2017-06-02 DIAGNOSIS — N39.0 URINARY TRACT INFECTION, SITE NOT SPECIFIED: ICD-10-CM

## 2017-06-02 PROCEDURE — 72142 MRI NECK SPINE W/DYE: CPT | Mod: 26

## 2017-06-02 RX ORDER — CIPROFLOXACIN AND DEXAMETHASONE 3; 1 MG/ML; MG/ML
3 SUSPENSION/ DROPS AURICULAR (OTIC)
Qty: 0 | Refills: 0 | DISCHARGE
Start: 2017-06-02 | End: 2017-06-09

## 2017-06-02 RX ORDER — CIPROFLOXACIN AND DEXAMETHASONE 3; 1 MG/ML; MG/ML
3 SUSPENSION/ DROPS AURICULAR (OTIC)
Qty: 0 | Refills: 0 | Status: DISCONTINUED | OUTPATIENT
Start: 2017-06-02 | End: 2017-06-02

## 2017-06-02 RX ORDER — POTASSIUM CHLORIDE 20 MEQ
40 PACKET (EA) ORAL ONCE
Qty: 0 | Refills: 0 | Status: COMPLETED | OUTPATIENT
Start: 2017-06-02 | End: 2017-06-02

## 2017-06-02 RX ADMIN — FINASTERIDE 5 MILLIGRAM(S): 5 TABLET, FILM COATED ORAL at 13:15

## 2017-06-02 RX ADMIN — HEPARIN SODIUM 5000 UNIT(S): 5000 INJECTION INTRAVENOUS; SUBCUTANEOUS at 09:11

## 2017-06-02 RX ADMIN — FAMOTIDINE 20 MILLIGRAM(S): 10 INJECTION INTRAVENOUS at 13:15

## 2017-06-02 RX ADMIN — Medication 1 PUFF(S): at 16:08

## 2017-06-02 RX ADMIN — Medication 500 MILLIGRAM(S): at 09:13

## 2017-06-02 RX ADMIN — PANTOPRAZOLE SODIUM 40 MILLIGRAM(S): 20 TABLET, DELAYED RELEASE ORAL at 09:13

## 2017-06-02 RX ADMIN — HEPARIN SODIUM 5000 UNIT(S): 5000 INJECTION INTRAVENOUS; SUBCUTANEOUS at 13:14

## 2017-06-02 RX ADMIN — Medication 1 PUFF(S): at 09:12

## 2017-06-02 RX ADMIN — Medication 100 MILLIGRAM(S): at 09:13

## 2017-06-02 RX ADMIN — Medication 40 MILLIEQUIVALENT(S): at 16:07

## 2017-06-02 RX ADMIN — TIOTROPIUM BROMIDE 1 CAPSULE(S): 18 CAPSULE ORAL; RESPIRATORY (INHALATION) at 09:12

## 2017-06-02 RX ADMIN — Medication 81 MILLIGRAM(S): at 13:15

## 2017-06-02 NOTE — PROGRESS NOTE ADULT - SUBJECTIVE AND OBJECTIVE BOX
Patient is a 72y old  Male who presents with a chief complaint of Altered mental status, lethargy (30 May 2017 12:59)      HPI:  pt seen, no new events, no pain/weakness      Vital Signs Last 24 Hrs  T(C): 36.7, Max: 36.7 (06-01 @ 21:42)  T(F): 98.1, Max: 98.1 (06-01 @ 21:42)  HR: 96 (85 - 96)  BP: 141/78 (120/77 - 141/78)  BP(mean): --  RR: 18 (17 - 18)  SpO2: 99% (99% - 100%)    Physical Exam    Mental Status- Awake,alert,confused  CN- 2-12 grossly intact  Motor- strength full  Sensory- intact Lt  Coordination- deferred  Gait- deferred

## 2017-06-02 NOTE — PROGRESS NOTE ADULT - PROBLEM SELECTOR PROBLEM 1
Sepsis
Sepsis due to urinary tract infection
Hyponatremia
Hyponatremia
Sepsis
Sepsis due to urinary tract infection
Sepsis due to urinary tract infection
Altered mental state
Sepsis
Sepsis
Sepsis due to urinary tract infection
Hyponatremia

## 2017-06-02 NOTE — PROGRESS NOTE ADULT - SUBJECTIVE AND OBJECTIVE BOX
**MEDICINE PROGRESS NOTE**      Patient is a 72y old  Male who presents with a chief complaint of Altered mental status, lethargy (30 May 2017 12:59)                                                                            SUBJECTIVE / OVERNIGHT EVENTS:    No acute events overnight    HEADACHE[  ]  FEVER[  ]   CHILLS[  ]   CHEST PAIN[  ]   SOB[  ]   CONSTIPATION[  ]    VOMITING[  ]    DIARRHEA[  ] ABD PAIN[  ]    ALLERGIES:  No Known Allergies    MEDICATIONS  (STANDING):  heparin  Injectable 5000Unit(s) SubCutaneous every 8 hours  finasteride 5milliGRAM(s) Oral daily  tamsulosin 0.4milliGRAM(s) Oral at bedtime  tiotropium 18 MICROgram(s) Capsule 1Capsule(s) Inhalation daily  ipratropium 17 MICROgram(s) HFA Inhaler 1Puff(s) Inhalation every 6 hours  pantoprazole    Tablet 40milliGRAM(s) Oral before breakfast  famotidine    Tablet 20milliGRAM(s) Oral daily  docusate sodium 100milliGRAM(s) Oral two times a day  senna 2Tablet(s) Oral at bedtime  cefuroxime   Tablet 500milliGRAM(s) Oral every 12 hours  aspirin enteric coated 81milliGRAM(s) Oral daily    MEDICATIONS  (PRN):  acetaminophen   Tablet 650milliGRAM(s) Oral every 6 hours PRN For Temp greater than 38.5 C (101.3 F)  acetaminophen   Tablet. 650milliGRAM(s) Oral every 6 hours PRN Mild to moderate pain  polyethylene glycol 3350 17Gram(s) Oral daily PRN Constipation      Vital Signs Last 24 Hrs  T(C): 36.7, Max: 36.7 (06-01 @ 21:42)  HR: 96 (85 - 96)  BP: 141/78 (120/77 - 141/78)  RR: 18 (17 - 18)  SpO2: 99% (99% - 100%)  Wt(kg): --  CAPILLARY BLOOD GLUCOSE      I&O's Summary    I & Os for current day (as of 02 Jun 2017 07:21)  =============================================  IN: 0 ml / OUT: 700 ml / NET: -700 ml              PHYSICAL EXAM:    GENERAL: NAD  HEENT:  CHEST/PULM: CTAB  HEART: RRR, no murmurs  ABDOMEN: Soft, NT/ND, BS(+)  EXTREMITIES:  ROM intact  PSYCH: appropriate mood and affect, no behavioral disturbance  NEURO: non-focal  SKIN: no edema,    LABS:                        11.5   7.37  )-----------( 152      ( 01 Jun 2017 06:00 )             33.5     06-01    139  |  104  |  18  ----------------------------<  103<H>  3.3<L>   |  23  |  0.99    Ca    8.2<L>      01 Jun 2017 06:00  Phos  3.0     06-01  Mg     1.8     06-01                  MICROBIOLOGY:  Blood Cultures          RADIOLOGY & ADDITIONAL TESTS:    Imaging Personally Reviewed:    Consultant(s) Notes Reviewed:      Care Discussed with Consultants/Other Providers: **MEDICINE PROGRESS NOTE**      Patient is a 72y old  Male who presents with a chief complaint of Altered mental status, lethargy (30 May 2017 12:59)                                                                            SUBJECTIVE / OVERNIGHT EVENTS:    No acute events overnight    HEADACHE[ - ]  FEVER[ - ]   CHILLS[ - ]   CHEST PAIN[ - ]   SOB[ - ]   CONSTIPATION[ - ]    VOMITING[ - ]    DIARRHEA[ - ] ABD PAIN[ - ]    ALLERGIES:  No Known Allergies    MEDICATIONS  (STANDING):  heparin  Injectable 5000Unit(s) SubCutaneous every 8 hours  finasteride 5milliGRAM(s) Oral daily  tamsulosin 0.4milliGRAM(s) Oral at bedtime  tiotropium 18 MICROgram(s) Capsule 1Capsule(s) Inhalation daily  ipratropium 17 MICROgram(s) HFA Inhaler 1Puff(s) Inhalation every 6 hours  pantoprazole    Tablet 40milliGRAM(s) Oral before breakfast  famotidine    Tablet 20milliGRAM(s) Oral daily  docusate sodium 100milliGRAM(s) Oral two times a day  senna 2Tablet(s) Oral at bedtime  cefuroxime   Tablet 500milliGRAM(s) Oral every 12 hours  aspirin enteric coated 81milliGRAM(s) Oral daily    MEDICATIONS  (PRN):  acetaminophen   Tablet 650milliGRAM(s) Oral every 6 hours PRN For Temp greater than 38.5 C (101.3 F)  acetaminophen   Tablet. 650milliGRAM(s) Oral every 6 hours PRN Mild to moderate pain  polyethylene glycol 3350 17Gram(s) Oral daily PRN Constipation      Vital Signs Last 24 Hrs  T(C): 36.7, Max: 36.7 (06-01 @ 21:42)  HR: 96 (85 - 96)  BP: 141/78 (120/77 - 141/78)  RR: 18 (17 - 18)  SpO2: 99% (99% - 100%)  Wt(kg): --  CAPILLARY BLOOD GLUCOSE      I&O's Summary    I & Os for current day (as of 02 Jun 2017 07:21)  =============================================  IN: 0 ml / OUT: 700 ml / NET: -700 ml              PHYSICAL EXAM:    GENERAL: NAD, son at bedside  HEENT: mmm, PERLLA, EOMNI  CHEST/PULM: CTAB  HEART: RRR, no murmurs  ABDOMEN: Soft, NT/ND, BS(+)  EXTREMITIES:  ROM intact  PSYCH: appropriate mood and affect, no behavioral disturbance  NEURO: non-focal  SKIN: no edema,    LABS:                        11.5   7.37  )-----------( 152      ( 01 Jun 2017 06:00 )             33.5     06-01    139  |  104  |  18  ----------------------------<  103<H>  3.3<L>   |  23  |  0.99    Ca    8.2<L>      01 Jun 2017 06:00  Phos  3.0     06-01  Mg     1.8     06-01                  MICROBIOLOGY:  Blood Cultures          RADIOLOGY & ADDITIONAL TESTS:    Imaging Personally Reviewed:    Consultant(s) Notes Reviewed:      Care Discussed with Consultants/Other Providers:

## 2017-06-02 NOTE — PROGRESS NOTE ADULT - ASSESSMENT
72M with PMH of HTN, BPH, and inguinal hernia s/p mesh repair who is brought to the hospital by his family due to altered mental status and lethargy found to be in urosepsis and hyponatremia of 115 on admission. Currently Na is 133 and sepsis seemingly resolved but patient continues to be altered and slurring speech concerning primary team of stroke.     Plan:  MRI c spine with contrast to eval signal change  Supportive care per primary team  DW son

## 2017-06-02 NOTE — PROGRESS NOTE ADULT - PROBLEM SELECTOR PLAN 5
- resolved  2/2 hyponatremia (metabolic), and sepsis (toxic)  - unremarkable CTH and MRI head  - tx as above.   -family concerned about mental state, neuro c/s  -f/u MRI c spine   - cont to appreciate recs  - Rehab at [place] - resolved  - 2/2 hyponatremia (metabolic), and sepsis (toxic)  - unremarkable CTH and MRI head  - tx as above.   -family concerned about mental state, neuro c/s  -f/u MRI c spine , orthospine c/s for syrinx  - cont to appreciate recs  - Rehab at [place]

## 2017-06-02 NOTE — PROGRESS NOTE ADULT - PROBLEM SELECTOR PLAN 6
- with mastoid opacification  -ENT c/s for ear pain - with mastoid opacification  -ENT c/s for ear pain  - rec ciprodex, oupt f/u at dc

## 2017-06-02 NOTE — PROGRESS NOTE ADULT - SUBJECTIVE AND OBJECTIVE BOX
INTERVAL HPI/OVERNIGHT EVENTS: Seen and examined with son in room. Pt wants to go home in [facility]. Walked to bathroom fine.   Vital Signs Last 24 Hrs  T(C): 36.7, Max: 36.7 (06-01 @ 21:42)  T(F): 98.1, Max: 98.1 (06-01 @ 21:42)  HR: 96 (85 - 96)  BP: 141/78 (120/77 - 141/78)  BP(mean): --  RR: 18 (17 - 18)  SpO2: 99% (99% - 100%)  I&O's Summary    I & Os for current day (as of 02 Jun 2017 11:15)  =============================================  IN: 350 ml / OUT: 700 ml / NET: -350 ml    MEDICATIONS  (STANDING):  heparin  Injectable 5000Unit(s) SubCutaneous every 8 hours  finasteride 5milliGRAM(s) Oral daily  tamsulosin 0.4milliGRAM(s) Oral at bedtime  tiotropium 18 MICROgram(s) Capsule 1Capsule(s) Inhalation daily  ipratropium 17 MICROgram(s) HFA Inhaler 1Puff(s) Inhalation every 6 hours  pantoprazole    Tablet 40milliGRAM(s) Oral before breakfast  famotidine    Tablet 20milliGRAM(s) Oral daily  docusate sodium 100milliGRAM(s) Oral two times a day  senna 2Tablet(s) Oral at bedtime  cefuroxime   Tablet 500milliGRAM(s) Oral every 12 hours  aspirin enteric coated 81milliGRAM(s) Oral daily    MEDICATIONS  (PRN):  acetaminophen   Tablet 650milliGRAM(s) Oral every 6 hours PRN For Temp greater than 38.5 C (101.3 F)  acetaminophen   Tablet. 650milliGRAM(s) Oral every 6 hours PRN Mild to moderate pain  polyethylene glycol 3350 17Gram(s) Oral daily PRN Constipation    LABS:                        11.5   7.37  )-----------( 152      ( 01 Jun 2017 06:00 )             33.5     06-01    139  |  104  |  18  ----------------------------<  103<H>  3.3<L>   |  23  |  0.99    Ca    8.2<L>      01 Jun 2017 06:00  Phos  3.0     06-01  Mg     1.8     06-01          CAPILLARY BLOOD GLUCOSE          REVIEW OF SYSTEMS:  CONSTITUTIONAL: No fever, weight loss, or fatigue  EYES: No eye pain, visual disturbances, or discharge  ENMT:  No difficulty hearing, tinnitus, vertigo; No sinus or throat pain  NECK: No pain or stiffness  BREASTS: No pain, masses, or nipple discharge  RESPIRATORY: No cough, wheezing, chills or hemoptysis; No shortness of breath  CARDIOVASCULAR: No chest pain, palpitations, dizziness, or leg swelling  GASTROINTESTINAL: No abdominal or epigastric pain. No nausea, vomiting, or hematemesis; No diarrhea or constipation. No melena or hematochezia.  GENITOURINARY: No dysuria, frequency, hematuria, or incontinence  NEUROLOGICAL: No headaches, memory loss, loss of strength, numbness, or tremors  SKIN: No itching, burning, rashes, or lesions   LYMPH NODES: No enlarged glands  ENDOCRINE: No heat or cold intolerance; No hair loss  MUSCULOSKELETAL: No joint pain or swelling; No muscle, back, or extremity pain  PSYCHIATRIC: No depression, anxiety, mood swings, or difficulty sleeping  HEME/LYMPH: No easy bruising, or bleeding gums  ALLERY AND IMMUNOLOGIC: No hives or eczema    RADIOLOGY & ADDITIONAL TESTS:    Imaging Personally Reviewed:  [ ] YES  [ ] NO    Consultant(s) Notes Reviewed:  [x ] YES  [ ] NO    PHYSICAL EXAM:  GENERAL: NAD, well-groomed, well-developed  HEAD:  Atraumatic, Normocephalic  EYES: EOMI, PERRLA, conjunctiva and sclera clear  ENMT: No tonsillar erythema, exudates, or enlargement; Moist mucous membranes, Good dentition, No lesions  NECK: Supple, No JVD, Normal thyroid  NERVOUS SYSTEM:  Alert & Oriented X3, Good concentration; Motor Strength 5/5 B/L upper and lower extremities; DTRs 2+ intact and symmetric  CHEST/LUNG: Clear to percussion bilaterally; No rales, rhonchi, wheezing, or rubs  HEART: Regular rate and rhythm; No murmurs, rubs, or gallops  ABDOMEN: Soft, Nontender, Nondistended; Bowel sounds present  EXTREMITIES:  2+ Peripheral Pulses, No clubbing, cyanosis, or edema  LYMPH: No lymphadenopathy noted  SKIN: No rashes or lesions    Care Discussed with Consultants/Other Providers [ x] YES  [ ] NO

## 2017-06-02 NOTE — PROGRESS NOTE ADULT - SUBJECTIVE AND OBJECTIVE BOX
Pt seen and examined at bedside  Pt without complaints. Denies SOB.   No acute events overnight.     Allergies:  No Known Allergies    Hospital Medications:   MEDICATIONS  (STANDING):  heparin  Injectable 5000Unit(s) SubCutaneous every 8 hours  finasteride 5milliGRAM(s) Oral daily  tamsulosin 0.4milliGRAM(s) Oral at bedtime  tiotropium 18 MICROgram(s) Capsule 1Capsule(s) Inhalation daily  ipratropium 17 MICROgram(s) HFA Inhaler 1Puff(s) Inhalation every 6 hours  pantoprazole    Tablet 40milliGRAM(s) Oral before breakfast  famotidine    Tablet 20milliGRAM(s) Oral daily  docusate sodium 100milliGRAM(s) Oral two times a day  senna 2Tablet(s) Oral at bedtime  cefuroxime   Tablet 500milliGRAM(s) Oral every 12 hours  aspirin enteric coated 81milliGRAM(s) Oral daily  ciprofloxacin/dexamethasone Suspension Otic 3Drop(s) Right Ear two times a day    REVIEW OF SYSTEMS:  CONSTITUTIONAL: No weakness, fevers or chills  EYES/ENT: No visual changes;  No vertigo or throat pain   NECK: No pain or stiffness  RESPIRATORY: No cough, wheezing, hemoptysis; No shortness of breath  CARDIOVASCULAR: No chest pain or palpitations.  GASTROINTESTINAL: No abdominal or epigastric pain. No nausea, vomiting, or hematemesis; No diarrhea or constipation. No melena or hematochezia.  GENITOURINARY: No dysuria, frequency, foamy urine, urinary urgency, incontinence or hematuria  NEUROLOGICAL: No numbness or weakness  SKIN: No itching, burning, rashes, or lesions   VASCULAR: No bilateral lower extremity edema.   All other review of systems is negative unless indicated above.    VITALS:  T(F): 99.1, Max: 99.1 ( @ 14:18)  HR: 106  BP: 127/83  RR: 17  SpO2: 100%  Wt(kg): --    I & Os for current day (as of  @ 14:39)  =============================================  IN: 350 ml / OUT: 700 ml / NET: -350 ml      PHYSICAL EXAM:  Constitutional: NAD  HEENT: anicteric sclera, oropharynx clear, MMM  Neck: No JVD  Respiratory: CTAB, no wheezes, rales or rhonchi  Cardiovascular: S1, S2, RRR  Gastrointestinal: BS+, soft, NT/ND  Extremities: No cyanosis or clubbing. No peripheral edema  Neurological: A/O x 3, no focal deficits  Psychiatric: Normal mood, normal affect  : No CVA tenderness. No elam.   Skin: No rashes    LABS:      139  |  104  |  18  ----------------------------<  103<H>  3.3<L>   |  23  |  0.99    Ca    8.2<L>      2017 06:00  Phos  3.0       Mg     1.8           Creatinine Trend: 0.99 <--, 1.09 <--, 1.07 <--, 1.15 <--, 1.01 <--, 1.06 <--, 0.95 <--, 1.01 <--, 1.09 <--, 1.25 <--                        11.5   7.37  )-----------( 152      ( 2017 06:00 )             33.5     Urine Studies:  Urinalysis Basic - ( 28 May 2017 15:20 )    Color: YELLOW / Appearance: TURBID / S.012 / pH: 6.0  Gluc: NEGATIVE / Ketone: NEGATIVE  / Bili: NEGATIVE / Urobili: NORMAL E.U.   Blood: MODERATE / Protein: 30 / Nitrite: NEGATIVE   Leuk Esterase: LARGE / RBC: 25-50 / WBC >50   Sq Epi: OCC / Non Sq Epi:  / Bacteria:       Sodium, Random Urine: 6 meq/L ( @ 20:11)  Potassium, Random Urine: 6.9 meq/L ( @ 20:11)  Chloride, Random Urine: 4 mmol/L ( @ 20:11)  Osmolality, Random Urine: 126 mosmo/kg ( @ 20:11)

## 2017-06-04 LAB
BACTERIA BLD CULT: SIGNIFICANT CHANGE UP
BACTERIA BLD CULT: SIGNIFICANT CHANGE UP

## 2017-06-14 PROBLEM — Z00.00 ENCOUNTER FOR PREVENTIVE HEALTH EXAMINATION: Noted: 2017-06-14

## 2017-11-02 RX ORDER — IBUPROFEN 200 MG
1 TABLET ORAL
Qty: 0 | Refills: 0 | COMMUNITY

## 2017-11-02 RX ORDER — LOSARTAN POTASSIUM 100 MG/1
1 TABLET, FILM COATED ORAL
Qty: 0 | Refills: 0 | COMMUNITY

## 2020-07-30 PROBLEM — N40.0 BENIGN PROSTATIC HYPERPLASIA WITHOUT LOWER URINARY TRACT SYMPTOMS: Chronic | Status: ACTIVE | Noted: 2017-05-28

## 2020-07-30 PROBLEM — I10 ESSENTIAL (PRIMARY) HYPERTENSION: Chronic | Status: ACTIVE | Noted: 2017-05-28

## 2020-08-17 PROBLEM — Z87.19 HISTORY OF INGUINAL HERNIA: Status: RESOLVED | Noted: 2020-08-17 | Resolved: 2020-08-17

## 2020-08-17 PROBLEM — Z87.438 HISTORY OF BENIGN PROSTATIC HYPERPLASIA: Status: RESOLVED | Noted: 2020-08-17 | Resolved: 2020-08-17

## 2020-08-17 PROBLEM — Z82.49 FAMILY HISTORY OF CARDIAC DISORDER: Status: ACTIVE | Noted: 2020-08-17

## 2020-08-17 PROBLEM — Z78.9 NON-SMOKER: Status: ACTIVE | Noted: 2020-08-17

## 2020-08-17 PROBLEM — Z86.79 HISTORY OF ESSENTIAL HYPERTENSION: Status: RESOLVED | Noted: 2020-08-17 | Resolved: 2020-08-17

## 2020-08-24 ENCOUNTER — APPOINTMENT (OUTPATIENT)
Dept: SURGERY | Facility: CLINIC | Age: 75
End: 2020-08-24
Payer: MEDICARE

## 2020-08-24 VITALS
SYSTOLIC BLOOD PRESSURE: 131 MMHG | HEIGHT: 63 IN | WEIGHT: 137 LBS | TEMPERATURE: 97.7 F | HEART RATE: 66 BPM | BODY MASS INDEX: 24.27 KG/M2 | DIASTOLIC BLOOD PRESSURE: 74 MMHG | OXYGEN SATURATION: 100 %

## 2020-08-24 DIAGNOSIS — Z82.49 FAMILY HISTORY OF ISCHEMIC HEART DISEASE AND OTHER DISEASES OF THE CIRCULATORY SYSTEM: ICD-10-CM

## 2020-08-24 DIAGNOSIS — Z87.19 PERSONAL HISTORY OF OTHER DISEASES OF THE DIGESTIVE SYSTEM: ICD-10-CM

## 2020-08-24 DIAGNOSIS — Z78.9 OTHER SPECIFIED HEALTH STATUS: ICD-10-CM

## 2020-08-24 DIAGNOSIS — Z87.438 PERSONAL HISTORY OF OTHER DISEASES OF MALE GENITAL ORGANS: ICD-10-CM

## 2020-08-24 DIAGNOSIS — Z86.79 PERSONAL HISTORY OF OTHER DISEASES OF THE CIRCULATORY SYSTEM: ICD-10-CM

## 2020-08-24 PROCEDURE — 99203 OFFICE O/P NEW LOW 30 MIN: CPT

## 2020-08-24 RX ORDER — HYDROCHLOROTHIAZIDE 12.5 MG/1
TABLET ORAL
Refills: 0 | Status: ACTIVE | COMMUNITY

## 2020-08-24 RX ORDER — LISINOPRIL 30 MG/1
TABLET ORAL
Refills: 0 | Status: ACTIVE | COMMUNITY

## 2020-08-24 NOTE — CONSULT LETTER
[Please see my note below.] : Please see my note below. [Dear  ___] : Dear  [unfilled], [Consult Letter:] : I had the pleasure of evaluating your patient, [unfilled]. [Sincerely,] : Sincerely, [Consult Closing:] : Thank you very much for allowing me to participate in the care of this patient.  If you have any questions, please do not hesitate to contact me. [FreeTextEntry3] : Karthik Kendrick MD, FACS

## 2020-08-24 NOTE — DATA REVIEWED
[FreeTextEntry1] : RADIOLOGY REPORT   FINDINGS   FINDING Reason for examination : Hematuria, testicular pain.  Technique: CT scan of the abdomen and pelvis was performed pre and post intravenous contrast with 5 mm thin sections. Following administration of 250 cc Normal Saline fluid and 10m IV Lasix to increase diuresis, the intravenous contrast was administered. Delayed phase images were also performed   Multiplanar reformats and volumetric rendering were completed on an independent application with concurrent supervision to better visualize the kidneys and ureters.  **This CT exam was performed using one or more of the following dose reduction techniques: Automated exposure control, adjustment of the mA and/or kV according to patient size, or use of iterative reconstruction technique  Comparison: None  FINDINGS:  Visualized lung bases: Dependent atelectasis. Mild coronary arterial calcifications.  Liver: Unremarkable  Bile ducts: No biliary ductal dilatation.  Spleen: Unremarkable  Adrenal glands: Unremarkable  Pancreas: Unremarkable. No ductal dilatation. Colonic diverticulosis.  Gallbladder: Several stones.   Kidneys: No radiopaque stones. Bilateral extrarenal pelves. Normal course and caliber of both ureters. However, some fat stranding is noted surrounding the left ureter and collecting system, with some urothelial enhancement and ureteral/left pelvic wall thickening on the left. Bilateral hypodense renal lesions, possibly representing cysts.  Bowel: No bowel obstruction. Unremarkable appendix.  No free fluid or free air. Postoperative changes in the anterior abdominal wall.  Lymph nodes: Left para-aortic confluent myrna mass measuring 2.3 x 4.6 cm on series 3 image 40. Mesenteric haziness and additional small lymph nodes.  Pelvic organs: Moderately enlarged prostate measuring 4.7 cm in transverse dimension.  Bladder: Mild wall thickening.  Osseous structures: Degenerative changes in the spine.  IMPRESSION:  1. No radiopaque urinary tract calculi or hydronephrosis.  2. Mild wall thickening in the left renal pelvis and ureter, with surrounding fat stranding, possibly inflammatory in nature. Other allergies cannot be excluded.  3. Confluent mass of lymph nodes in the left para-aortic region as discussed above, suspicious for possible lymphoma or metastatic disease, with atypical inflammatory process being considered less likely. Further evaluation with a PET-CT scan and/or histologic analysis is suggested.  4. Mesenteric haziness and nonspecific lymph nodes, possibly reactive or inflammatory nature.  5. Cholelithiasis.  6. Enlarged prostate.  7. Mild wall thickening of the urinary bladder. Please correlate with urinalysis to exclude cystitis.  A message was left with a staff member at the office of WILLY Hernández, upright interpretation of the examination approximately 1450 hours on 3/11/2020.  Electronically signed by: Michael Che MD 3/11/2020 2:52 PM   Workstation: [id number]  Contrast:  Administered 98.0 ml of 350 mg/ml OMNIPAQUE total iodine 34.300 gI.  RESULTS:  As a requirement of performing this exam, ISTAT was used to calculate Creatinine and GFR for your patient.  EGFR: 82    Electronically Signed By: Michael Che MD  Sign Date: 11-MAR-20 [facility]

## 2020-08-24 NOTE — HISTORY OF PRESENT ILLNESS
[de-identified] : This is a 75 year  old patient who was referred by Dr. Mil Buckley with the chief complaint of having  enlarged lymph nodes suspicious for  lymphoma.  Patient reports no symptoms.   He denies any fever or  night sweats. Appetite is good and weight is stable.    patient had a CT scan of the abdomen and pelvis on 03/11/2020 that showed . Confluent mass of lymph nodes in the left para-aortic region  , suspicious for possible lymphoma or metastatic disease, with atypical inflammatory process being considered less likely.

## 2020-08-24 NOTE — PHYSICAL EXAM
[Alert] : alert [Oriented to Person] : oriented to person [Oriented to Place] : oriented to place [Oriented to Time] : oriented to time [Calm] : calm [Abdominal Masses] : No abdominal masses [de-identified] : He  is alert, well-groomed, and cheerful.\par   [Abdomen Tenderness] : ~T ~M No abdominal tenderness [de-identified] :   anicteric.  Nasal mucosa pink, septum midline. Oral mucosa pink.  Tongue midline, Pharynx without exudates.\par   [de-identified] :  Neck supple. Trachea midline. Thyroid isthmus barely palpable, lobes not felt.\par

## 2020-08-24 NOTE — ASSESSMENT
[FreeTextEntry1] : Impression:  Patient has a  mass of lymph nodes in the left para-aortic region suspicious for possible lymphoma or metastatic disease, with atypical inflammatory process being considered less likely.

## 2020-08-24 NOTE — PLAN
[FreeTextEntry1] : Mr. RODRIGUEZ has intraabdominal lymphadenopathy suspicious for lymphoma. Images were reviewed using [facility] web site.  PAtient  was told significance of findings, options, risks and benefits were explained.   All surgical options were discussed including non-surgical treatments.   patient was advised to return to the radiology and get the images and return to the office. we will ask the radiologist to review the images for possible IR biopsy. \par Patient advised to seek immediate medical attention with any acute change in symptoms or with the development of any new or worsening symptoms.  Patient's questions and concerns addressed to patient's satisfaction, and patient verbalized an understanding of the information discussed.\par \par

## 2020-08-31 ENCOUNTER — APPOINTMENT (OUTPATIENT)
Dept: SURGERY | Facility: CLINIC | Age: 75
End: 2020-08-31
Payer: MEDICARE

## 2020-08-31 VITALS
HEART RATE: 70 BPM | SYSTOLIC BLOOD PRESSURE: 124 MMHG | HEIGHT: 63 IN | WEIGHT: 137 LBS | BODY MASS INDEX: 24.27 KG/M2 | DIASTOLIC BLOOD PRESSURE: 74 MMHG | OXYGEN SATURATION: 99 %

## 2020-08-31 PROBLEM — R59.1 LYMPHADENOPATHY: Status: ACTIVE | Noted: 2020-08-24

## 2020-08-31 PROCEDURE — 99213 OFFICE O/P EST LOW 20 MIN: CPT

## 2020-08-31 NOTE — PHYSICAL EXAM
[Alert] : alert [Oriented to Person] : oriented to person [Oriented to Place] : oriented to place [Oriented to Time] : oriented to time [Calm] : calm [Abdominal Masses] : No abdominal masses [Abdomen Tenderness] : ~T ~M No abdominal tenderness [de-identified] : He  is alert, well-groomed, and cheerful.\par   [de-identified] :   anicteric.  Nasal mucosa pink, septum midline. Oral mucosa pink.  Tongue midline, Pharynx without exudates.\par   [de-identified] :  Neck supple. Trachea midline. Thyroid isthmus barely palpable, lobes not felt.\par

## 2020-08-31 NOTE — HISTORY OF PRESENT ILLNESS
[de-identified] : This is a 75 year  old patient who was referred by Dr. Mil Buckley with the chief complaint of having  enlarged lymph nodes suspicious for  lymphoma.   patient had a CT scan of the abdomen and pelvis on 03/11/2020 that showed . Confluent mass of lymph nodes in the left para-aortic region  , suspicious for  possible lymphoma or metastatic disease, with atypical inflammatory process being considered less likely.  patient is here bringing the CT images and to discuss surgical and non-surgical options.  Patient reports no symptoms.   He denies any fever or  night sweats. Appetite is good and weight is stable.

## 2020-08-31 NOTE — PLAN
[FreeTextEntry1] : Mr. RODRIGUEZ  was told significance of findings, options, risks and benefits were explained.    All surgical options were discussed including non-surgical treatments.   The case was discussed with Interventional radiology. the will make their recommendations after they review the CT images.  Patient will return on Thursday to discuss his treatment options. \par Patient advised to seek immediate medical attention with any acute change in symptoms or with the development of any new or worsening symptoms.  Patient's questions and concerns addressed to patient's satisfaction, and patient verbalized an understanding of the information discussed.\par \par

## 2020-09-03 ENCOUNTER — APPOINTMENT (OUTPATIENT)
Dept: SURGERY | Facility: CLINIC | Age: 75
End: 2020-09-03
Payer: MEDICARE

## 2020-09-03 VITALS
HEART RATE: 71 BPM | BODY MASS INDEX: 24.27 KG/M2 | OXYGEN SATURATION: 100 % | HEIGHT: 63 IN | TEMPERATURE: 97 F | DIASTOLIC BLOOD PRESSURE: 79 MMHG | SYSTOLIC BLOOD PRESSURE: 129 MMHG | WEIGHT: 137 LBS

## 2020-09-03 DIAGNOSIS — R59.1 GENERALIZED ENLARGED LYMPH NODES: ICD-10-CM

## 2020-09-03 PROCEDURE — 99213 OFFICE O/P EST LOW 20 MIN: CPT

## 2020-09-03 NOTE — PHYSICAL EXAM
[Abdomen Tenderness] : ~T ~M No abdominal tenderness [Abdominal Masses] : No abdominal masses [Alert] : alert [Oriented to Time] : oriented to time [Oriented to Person] : oriented to person [Oriented to Place] : oriented to place [Calm] : calm [de-identified] : He  is alert, well-groomed, and cheerful.\par   [de-identified] : anicteric.  Nasal mucosa pink, septum midline. Oral mucosa pink.  Tongue midline, Pharynx without exudates.\par   [de-identified] : CHAPARRITA

## 2020-09-03 NOTE — HISTORY OF PRESENT ILLNESS
[de-identified] : This is a 75 year  old patient who was referred by Dr. Mil Buckley with the chief complaint of having  enlarged lymph nodes suspicious for  lymphoma.   patient had a CT scan of the abdomen and pelvis on 03/11/2020 that showed . Confluent mass of lymph nodes in the left para-aortic region  , suspicious for  possible lymphoma or metastatic disease, with atypical inflammatory process being considered less likely. CT images were reviewed by IR DR Teixeira  who agrees to attempt to biopsy the tumor using CT guidance. Today  Mr. RODRIGUEZ offers no complaints. patient reports no fever, chills,  or  pain. Patient reports good bowel movements and appetite. \par

## 2020-09-03 NOTE — ASSESSMENT
[FreeTextEntry1] : Impression: Patient has a mass of lymph nodes in the left para-aortic region suspicious for possible lymphoma or metastatic disease \par

## 2020-09-03 NOTE — PLAN
[FreeTextEntry1] : Mr. RODRIGUEZ was told significance of findings, options, risks and benefits were explained. All surgical options were discussed including non-surgical treatments. The case was discussed with Interventional radiology.  Dr Teixeira will attempts CT guided biopsy of the intraabdominal mass .  PAtient does not want to have the biopsy done now. Patient states he is traveling to [facility] for personal reasons and he will return to the office when he returns from his trip. \par Patient advised to seek immediate medical attention with any acute change in symptoms or with the development of any new or worsening symptoms. Patient's questions and concerns addressed to patient's satisfaction, and patient verbalized an understanding of the information discussed.\par \par

## 2020-10-17 ENCOUNTER — EMERGENCY (EMERGENCY)
Facility: HOSPITAL | Age: 75
LOS: 1 days | Discharge: HOME | End: 2020-10-17
Attending: EMERGENCY MEDICINE
Payer: COMMERCIAL

## 2020-10-17 VITALS
HEIGHT: 66 IN | HEART RATE: 87 BPM | RESPIRATION RATE: 18 BRPM | TEMPERATURE: 98 F | SYSTOLIC BLOOD PRESSURE: 134 MMHG | OXYGEN SATURATION: 99 % | DIASTOLIC BLOOD PRESSURE: 75 MMHG | WEIGHT: 138.01 LBS

## 2020-10-17 DIAGNOSIS — K40.20 BILATERAL INGUINAL HERNIA, WITHOUT OBSTRUCTION OR GANGRENE, NOT SPECIFIED AS RECURRENT: Chronic | ICD-10-CM

## 2020-10-17 DIAGNOSIS — K46.9 UNSPECIFIED ABDOMINAL HERNIA WITHOUT OBSTRUCTION OR GANGRENE: Chronic | ICD-10-CM

## 2020-10-17 LAB
ALBUMIN SERPL ELPH-MCNC: 3.5 G/DL — SIGNIFICANT CHANGE UP (ref 3.3–5.2)
ALP SERPL-CCNC: 75 U/L — SIGNIFICANT CHANGE UP (ref 40–120)
ALT FLD-CCNC: 11 U/L — SIGNIFICANT CHANGE UP
ANION GAP SERPL CALC-SCNC: 11 MMOL/L — SIGNIFICANT CHANGE UP (ref 5–17)
APPEARANCE UR: CLEAR — SIGNIFICANT CHANGE UP
AST SERPL-CCNC: 24 U/L — SIGNIFICANT CHANGE UP
BACTERIA # UR AUTO: ABNORMAL
BASOPHILS # BLD AUTO: 0.02 K/UL — SIGNIFICANT CHANGE UP (ref 0–0.2)
BASOPHILS NFR BLD AUTO: 0.3 % — SIGNIFICANT CHANGE UP (ref 0–2)
BILIRUB SERPL-MCNC: 0.5 MG/DL — SIGNIFICANT CHANGE UP (ref 0.4–2)
BILIRUB UR-MCNC: NEGATIVE — SIGNIFICANT CHANGE UP
BUN SERPL-MCNC: 16 MG/DL — SIGNIFICANT CHANGE UP (ref 8–20)
CALCIUM SERPL-MCNC: 8.7 MG/DL — SIGNIFICANT CHANGE UP (ref 8.6–10.2)
CHLORIDE SERPL-SCNC: 96 MMOL/L — LOW (ref 98–107)
CO2 SERPL-SCNC: 23 MMOL/L — SIGNIFICANT CHANGE UP (ref 22–29)
COLOR SPEC: YELLOW — SIGNIFICANT CHANGE UP
CREAT SERPL-MCNC: 1.01 MG/DL — SIGNIFICANT CHANGE UP (ref 0.5–1.3)
DIFF PNL FLD: ABNORMAL
EOSINOPHIL # BLD AUTO: 0.07 K/UL — SIGNIFICANT CHANGE UP (ref 0–0.5)
EOSINOPHIL NFR BLD AUTO: 0.9 % — SIGNIFICANT CHANGE UP (ref 0–6)
EPI CELLS # UR: SIGNIFICANT CHANGE UP
GLUCOSE SERPL-MCNC: 116 MG/DL — HIGH (ref 70–99)
GLUCOSE UR QL: NEGATIVE MG/DL — SIGNIFICANT CHANGE UP
HCT VFR BLD CALC: 30.2 % — LOW (ref 39–50)
HGB BLD-MCNC: 9.9 G/DL — LOW (ref 13–17)
IMM GRANULOCYTES NFR BLD AUTO: 0.9 % — SIGNIFICANT CHANGE UP (ref 0–1.5)
KETONES UR-MCNC: NEGATIVE — SIGNIFICANT CHANGE UP
LEUKOCYTE ESTERASE UR-ACNC: ABNORMAL
LYMPHOCYTES # BLD AUTO: 1.21 K/UL — SIGNIFICANT CHANGE UP (ref 1–3.3)
LYMPHOCYTES # BLD AUTO: 15.4 % — SIGNIFICANT CHANGE UP (ref 13–44)
MCHC RBC-ENTMCNC: 26.6 PG — LOW (ref 27–34)
MCHC RBC-ENTMCNC: 32.8 GM/DL — SIGNIFICANT CHANGE UP (ref 32–36)
MCV RBC AUTO: 81.2 FL — SIGNIFICANT CHANGE UP (ref 80–100)
MONOCYTES # BLD AUTO: 0.36 K/UL — SIGNIFICANT CHANGE UP (ref 0–0.9)
MONOCYTES NFR BLD AUTO: 4.6 % — SIGNIFICANT CHANGE UP (ref 2–14)
NEUTROPHILS # BLD AUTO: 6.13 K/UL — SIGNIFICANT CHANGE UP (ref 1.8–7.4)
NEUTROPHILS NFR BLD AUTO: 77.9 % — HIGH (ref 43–77)
NITRITE UR-MCNC: NEGATIVE — SIGNIFICANT CHANGE UP
PH UR: 8 — SIGNIFICANT CHANGE UP (ref 5–8)
PLATELET # BLD AUTO: 268 K/UL — SIGNIFICANT CHANGE UP (ref 150–400)
POTASSIUM SERPL-MCNC: 3.7 MMOL/L — SIGNIFICANT CHANGE UP (ref 3.5–5.3)
POTASSIUM SERPL-SCNC: 3.7 MMOL/L — SIGNIFICANT CHANGE UP (ref 3.5–5.3)
PROT SERPL-MCNC: 6.9 G/DL — SIGNIFICANT CHANGE UP (ref 6.6–8.7)
PROT UR-MCNC: 15 MG/DL
RBC # BLD: 3.72 M/UL — LOW (ref 4.2–5.8)
RBC # FLD: 15.8 % — HIGH (ref 10.3–14.5)
RBC CASTS # UR COMP ASSIST: ABNORMAL /HPF (ref 0–4)
SODIUM SERPL-SCNC: 130 MMOL/L — LOW (ref 135–145)
SP GR SPEC: 1.01 — SIGNIFICANT CHANGE UP (ref 1.01–1.02)
UROBILINOGEN FLD QL: NEGATIVE MG/DL — SIGNIFICANT CHANGE UP
WBC # BLD: 7.86 K/UL — SIGNIFICANT CHANGE UP (ref 3.8–10.5)
WBC # FLD AUTO: 7.86 K/UL — SIGNIFICANT CHANGE UP (ref 3.8–10.5)
WBC UR QL: ABNORMAL

## 2020-10-17 PROCEDURE — 76870 US EXAM SCROTUM: CPT | Mod: 26

## 2020-10-17 PROCEDURE — 99220: CPT

## 2020-10-17 PROCEDURE — 74177 CT ABD & PELVIS W/CONTRAST: CPT | Mod: 26

## 2020-10-17 RX ORDER — LISINOPRIL 2.5 MG/1
40 TABLET ORAL DAILY
Refills: 0 | Status: DISCONTINUED | OUTPATIENT
Start: 2020-10-17 | End: 2020-10-22

## 2020-10-17 RX ORDER — FINASTERIDE 5 MG/1
5 TABLET, FILM COATED ORAL DAILY
Refills: 0 | Status: DISCONTINUED | OUTPATIENT
Start: 2020-10-17 | End: 2020-10-22

## 2020-10-17 RX ORDER — SODIUM CHLORIDE 9 MG/ML
1000 INJECTION INTRAMUSCULAR; INTRAVENOUS; SUBCUTANEOUS
Refills: 0 | Status: DISCONTINUED | OUTPATIENT
Start: 2020-10-17 | End: 2020-10-22

## 2020-10-17 RX ORDER — SODIUM CHLORIDE 9 MG/ML
1000 INJECTION INTRAMUSCULAR; INTRAVENOUS; SUBCUTANEOUS ONCE
Refills: 0 | Status: COMPLETED | OUTPATIENT
Start: 2020-10-17 | End: 2020-10-17

## 2020-10-17 RX ORDER — HYDROCHLOROTHIAZIDE 25 MG
12.5 TABLET ORAL DAILY
Refills: 0 | Status: DISCONTINUED | OUTPATIENT
Start: 2020-10-17 | End: 2020-10-22

## 2020-10-17 RX ORDER — ACETAMINOPHEN 500 MG
650 TABLET ORAL EVERY 6 HOURS
Refills: 0 | Status: DISCONTINUED | OUTPATIENT
Start: 2020-10-17 | End: 2020-10-22

## 2020-10-17 RX ORDER — IBUPROFEN 200 MG
600 TABLET ORAL EVERY 6 HOURS
Refills: 0 | Status: DISCONTINUED | OUTPATIENT
Start: 2020-10-17 | End: 2020-10-22

## 2020-10-17 RX ORDER — TAMSULOSIN HYDROCHLORIDE 0.4 MG/1
0.4 CAPSULE ORAL AT BEDTIME
Refills: 0 | Status: DISCONTINUED | OUTPATIENT
Start: 2020-10-17 | End: 2020-10-22

## 2020-10-17 RX ORDER — CEFTRIAXONE 500 MG/1
1000 INJECTION, POWDER, FOR SOLUTION INTRAMUSCULAR; INTRAVENOUS EVERY 12 HOURS
Refills: 0 | Status: DISCONTINUED | OUTPATIENT
Start: 2020-10-17 | End: 2020-10-22

## 2020-10-17 RX ORDER — IOHEXOL 300 MG/ML
30 INJECTION, SOLUTION INTRAVENOUS ONCE
Refills: 0 | Status: COMPLETED | OUTPATIENT
Start: 2020-10-17 | End: 2020-10-17

## 2020-10-17 RX ORDER — CEFTRIAXONE 500 MG/1
1000 INJECTION, POWDER, FOR SOLUTION INTRAMUSCULAR; INTRAVENOUS ONCE
Refills: 0 | Status: COMPLETED | OUTPATIENT
Start: 2020-10-17 | End: 2020-10-17

## 2020-10-17 RX ORDER — CEPHALEXIN 500 MG
500 CAPSULE ORAL EVERY 12 HOURS
Refills: 0 | Status: DISCONTINUED | OUTPATIENT
Start: 2020-10-17 | End: 2020-10-17

## 2020-10-17 RX ADMIN — TAMSULOSIN HYDROCHLORIDE 0.4 MILLIGRAM(S): 0.4 CAPSULE ORAL at 23:32

## 2020-10-17 RX ADMIN — IOHEXOL 30 MILLILITER(S): 300 INJECTION, SOLUTION INTRAVENOUS at 17:54

## 2020-10-17 RX ADMIN — CEFTRIAXONE 100 MILLIGRAM(S): 500 INJECTION, POWDER, FOR SOLUTION INTRAMUSCULAR; INTRAVENOUS at 22:07

## 2020-10-17 RX ADMIN — SODIUM CHLORIDE 75 MILLILITER(S): 9 INJECTION INTRAMUSCULAR; INTRAVENOUS; SUBCUTANEOUS at 23:41

## 2020-10-17 RX ADMIN — SODIUM CHLORIDE 1000 MILLILITER(S): 9 INJECTION INTRAMUSCULAR; INTRAVENOUS; SUBCUTANEOUS at 17:54

## 2020-10-17 RX ADMIN — LISINOPRIL 40 MILLIGRAM(S): 2.5 TABLET ORAL at 23:40

## 2020-10-17 NOTE — ED STATDOCS - PROGRESS NOTE DETAILS
74 y/o M pt presents to ED c/o significant scrotal swelling x 1 week. Pt c/o mild pain. Denies penile discharge, rash, erythema purulent drainage. Denies fever, chills, vomiting or ABD pain. No further complaints at this time.  pt to be transferred to [facility] for further evaluation and disposition

## 2020-10-17 NOTE — ED ADULT NURSE NOTE - CHPI ED NUR SYMPTOMS NEG
no diarrhea/no abdominal distension/no fever/no hematuria/no nausea/no blood in stool/no chills/no dysuria

## 2020-10-17 NOTE — ED STATDOCS - GENITOURINARY, MLM
normal... no bladder tenderness, no bilateral CVA tenderness, testicles soft non TTP w/+ significant right testicular swelling, no erythema, purulent discharge

## 2020-10-17 NOTE — ED CDU PROVIDER INITIAL DAY NOTE - MEDICAL DECISION MAKING DETAILS
76 yo male presenting with scrotal swelling and pain with symptomatic improvement sono revealing what as per urology is most likely chronic ischemic changes to the left testicle with likely right orchitis. ct revealing a retroperitoneal mass of unknown etiology. pt reported improved symptoms/ will continue with abx and pain control and re-eval am

## 2020-10-17 NOTE — ED STATDOCS - NS_ ATTENDINGSCRIBEDETAILS _ED_A_ED_FT
I, Bravo Lewis, performed the initial face to face bedside interview with this patient regarding history of present illness, review of symptoms and relevant past medical, social and family history.  I completed an independent physical examination.   The history, relevant review of systems, past medical and surgical history, medical decision making, and physical examination was documented by the scribe in my presence and I attest to the accuracy of the documentation.

## 2020-10-17 NOTE — ED STATDOCS - OBJECTIVE STATEMENT
74 y/o M pt presents to ED c/o significant scrotal swelling x 1 week. Pt c/o mild pain. Denies penile discharge, rash, erythema purulent drainage. Denies fever, chills, vomiting or ABD pain. No further complaints at this time.

## 2020-10-17 NOTE — ED ADULT NURSE NOTE - CADM POA PRESS ULCER
No Modified Advancement Flap Text: The defect edges were debeveled with a #15 scalpel blade.  Given the location of the defect, shape of the defect and the proximity to free margins a modified advancement flap was deemed most appropriate.  Using a sterile surgical marker, an appropriate advancement flap was drawn incorporating the defect and placing the expected incisions within the relaxed skin tension lines where possible.    The area thus outlined was incised deep to adipose tissue with a #15 scalpel blade.  The skin margins were undermined to an appropriate distance in all directions utilizing iris scissors.

## 2020-10-17 NOTE — ED CDU PROVIDER INITIAL DAY NOTE - OBJECTIVE STATEMENT
74 yo male hx of htn, inguinal hernia repair, scrotal swelling, BPH (takes lisinopril HCTZ finasteride and tamsulosin); p/w worsening right sided scrotal swelling assoc with abdominal pain and constipation; patient states he last followed up with urologist 4 months ago, at which time his scrotum was much smaller, minimal swelling; over last week or so, swelling worse, now assoc with some mild discomfort; also notes left lower abd pain asoc with constipation, now bowel movement for 6 days; no urinary difficulty, dysuria hematuria penile lesions or discharge. no reported fevers or chills.   pt seen by urology recommending flow max abx and pain control.  found to have retroperitoneal mass on ct that will need further evaluation as outpatient.   UROLOGY Lisandra   denies smoking illicit drug etoh   no known hx of cancer or familal cancers

## 2020-10-17 NOTE — ED PROVIDER NOTE - GASTROINTESTINAL, MLM
Abdomen soft, non-distended; +LLQ ttp and suprapubic ttp; no obvious hernia; rectal: +stool impaction, non tender prostate

## 2020-10-17 NOTE — ED PROVIDER NOTE - PROGRESS NOTE DETAILS
Kian: Pt w/ US findings c/w epididymoorchitis, possible torsion/detorsion but evaluated by dr. Barton from urology and more likely orchitis. Incidental noted retroperitoneal mass, no acute intervention, but needs outpatient follow up. Will continue IV antibiotics in observation, observe for recurrent pain.

## 2020-10-17 NOTE — CONSULT NOTE ADULT - ASSESSMENT
Likely right orchitis    1.  abx  2.  UC  3.  start flomax 0.4mg po qd  4.  Motrin 800mg po q6 x 3 days  5.  I do not know the significant or etiology of his retroperitoneal mass but the patient needs to be aware of the importance of FU upon discharge(ED oncology etc for work up)  6.  His left testicle has changes likely do to chronic ischemia and has no need for treatment

## 2020-10-17 NOTE — ED PROVIDER NOTE - GENITOURINARY, MLM
No discharge, lesions. left testis palpable, non tender; right hemiscrotum with firm swelling, unable to distinguish testis; no julieta ttp, no cellulitic changes; no obvious inguinal hernia

## 2020-10-17 NOTE — ED ADULT NURSE NOTE - OBJECTIVE STATEMENT
Pt. presents alert and oriented x 4 to ED complaining of testicular pain x 1 week. Pt. at this time denies pain but endorses scrotal swelling. Noted scrotal edema upon exam, no redness, wounds, discharge. Pt. states he is constipated and has not had a BM in 6 days. Pt. denies dysuria, hematuria, fever, body aches, chills. Pt. ambulatory, GUEVARA, skin warm and dry. Pt. last saw urologist 4 months.

## 2020-10-17 NOTE — ED PROVIDER NOTE - OBJECTIVE STATEMENT
76 yo male hx of htn, inguinal hernia repair, scrotal swelling, BPH (takes lisinopril HCTZ finasteride and tamsulosin); p/w worsening right sided scrotal swelling assoc with abdominal pain and constipation; patient states he last followed up with urologist 4 months ago, at which time time his scrotum was much smaller, minimal swelling; over last week or so, swelling worse, now assoc with some mild discomfort; also notes left lower abd pain asoc with consitpation, now bowel movement for 6 days; no urinary difficulty

## 2020-10-17 NOTE — CONSULT NOTE ADULT - SUBJECTIVE AND OBJECTIVE BOX
HPI:  Patient with 1 week of testicular swelling and pain on the right.  Pain was 7/10 in severity and aching in nature.  + subjective fevers and chills.  No n/v.  Patient reports mild dysuria and intermittent stream of urine.  No hematuria.      PAST MEDICAL & SURGICAL HISTORY:  BPH (benign prostatic hyperplasia)    HTN (hypertension)    Bilateral inguinal hernia        REVIEW OF SYSTEMS:    Constitutional: No fever, weight loss or fatigue  Eyes: No eye pain, visual disturbances, or discharge  ENMT:  No difficulty hearing, tinnitus, vertigo; No sinus or throat pain  Respiratory: No cough, wheezing, chills or hemoptysis  Cardiovascular: No chest pain, palpitations, shortness of breath, dizziness or leg swelling  Gastrointestinal: No abdominal or epigastric pain. No nausea, vomiting or hematemesis; No diarrhea or constipation. No melena or hematochezia.  Genitourinary: as above  Rectal: No pain, hemorrhoids or incontinence  Neurological: No headaches, memory loss, loss of strength, numbness or tremors  Skin: No itching, burning, rashes or lesions   Lymph Nodes: No enlarged glands  Musculoskeletal: No joint pain or swelling; No muscle, back or extremity pain  Psychiatric: No depression, anxiety, mood swings or difficulty sleeping  Heme/Lymph: No easy bruising or bleeding gums  Allergy and Immunologic: No hives or eczema    MEDICATIONS  (STANDING):    MEDICATIONS  (PRN):      Allergies    No Known Allergies    Intolerances        SOCIAL HISTORY:    FAMILY HISTORY:      Vital Signs Last 24 Hrs  T(C): 36.7 (17 Oct 2020 14:57), Max: 36.7 (17 Oct 2020 14:57)  T(F): 98 (17 Oct 2020 14:57), Max: 98 (17 Oct 2020 14:57)  HR: 87 (17 Oct 2020 14:57) (87 - 87)  BP: 134/75 (17 Oct 2020 14:57) (134/75 - 134/75)  BP(mean): --  RR: 18 (17 Oct 2020 14:57) (18 - 18)  SpO2: 99% (17 Oct 2020 14:57) (99% - 99%)    PHYSICAL EXAM:    General: Well developed; well nourished; in no acute distress  Head: Normocephalic; atraumatic  Respiratory: No wheezes, rales or rhonchi  Cardiovascular: Regular rate and rhythm. S1 and S2 Normal; No murmurs, gallops or rubs  Gastrointestinal: Soft non-tender non-distended; Normal bowel sounds; No hepatosplenomegaly  Genitourinary: + right testicular tenderness, + hydrocele/ Tender epididymis  Extremities: Normal range of motion, No clubbing, cyanosis or edema  Vascular: Peripheral pulses palpable 2+ bilaterally  Neurological: Alert and oriented x4  Skin: Warm and dry. No acute rash  Musculoskeletal: Normal gait, tone, without deformities  Psychiatric: Cooperative and appropriate      LABS:                        9.9    7.86  )-----------( 268      ( 17 Oct 2020 17:46 )             30.2     10-17    130<L>  |  96<L>  |  16.0  ----------------------------<  116<H>  3.7   |  23.0  |  1.01    Ca    8.7      17 Oct 2020 17:46    TPro  6.9  /  Alb  3.5  /  TBili  0.5  /  DBili  x   /  AST  24  /  ALT  11  /  AlkPhos  75  10-17      Urinalysis Basic - ( 17 Oct 2020 18:12 )    Color: Yellow / Appearance: Clear / S.010 / pH: x  Gluc: x / Ketone: Negative  / Bili: Negative / Urobili: Negative mg/dL   Blood: x / Protein: 15 mg/dL / Nitrite: Negative   Leuk Esterase: Moderate / RBC: 6-10 /HPF / WBC 6-10   Sq Epi: x / Non Sq Epi: Occasional / Bacteria: Few        RADIOLOGY & ADDITIONAL STUDIES:    Sono- Small, heterogeneous left testicle with complete absence of blood flow, compatible with torsion and testicular infarction/necrosis, and suggesting chronicity.    Markedly hypervascular and epididymis with normal echogenicity/echotexture, including large right hydrocele, most suggestive of of epididymoorchitis. Please note that torsion/spontaneous detorsion complex with reactive hyperemia is on the differential diagnosis.    CT- .8 x 5 cm sized heterogeneously enhancing retroperitoneal soft tissue mass in the periaortic region displacing the aorta and causing left pelviectasis. 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 Other retroperitoneal mass cannot be excluded.    Urothelial enhancement of the renal pelvis and bladder wall concerning for urinary tract infection.    Enlarged right testis with large hydrocele, consistent with ultrasound findings.

## 2020-10-18 VITALS
SYSTOLIC BLOOD PRESSURE: 159 MMHG | OXYGEN SATURATION: 100 % | DIASTOLIC BLOOD PRESSURE: 88 MMHG | TEMPERATURE: 98 F | HEART RATE: 76 BPM | RESPIRATION RATE: 18 BRPM

## 2020-10-18 PROCEDURE — 74177 CT ABD & PELVIS W/CONTRAST: CPT

## 2020-10-18 PROCEDURE — 80053 COMPREHEN METABOLIC PANEL: CPT

## 2020-10-18 PROCEDURE — 99217: CPT

## 2020-10-18 PROCEDURE — T1013: CPT

## 2020-10-18 PROCEDURE — 76870 US EXAM SCROTUM: CPT

## 2020-10-18 PROCEDURE — 85025 COMPLETE CBC W/AUTO DIFF WBC: CPT

## 2020-10-18 PROCEDURE — 87186 SC STD MICRODIL/AGAR DIL: CPT

## 2020-10-18 PROCEDURE — 36415 COLL VENOUS BLD VENIPUNCTURE: CPT

## 2020-10-18 PROCEDURE — 87086 URINE CULTURE/COLONY COUNT: CPT

## 2020-10-18 PROCEDURE — 96376 TX/PRO/DX INJ SAME DRUG ADON: CPT

## 2020-10-18 PROCEDURE — 81001 URINALYSIS AUTO W/SCOPE: CPT

## 2020-10-18 PROCEDURE — 96374 THER/PROPH/DIAG INJ IV PUSH: CPT | Mod: XU

## 2020-10-18 PROCEDURE — G0378: CPT

## 2020-10-18 PROCEDURE — 99284 EMERGENCY DEPT VISIT MOD MDM: CPT | Mod: 25

## 2020-10-18 RX ORDER — CEPHALEXIN 500 MG
1 CAPSULE ORAL
Qty: 42 | Refills: 0
Start: 2020-10-18 | End: 2020-10-31

## 2020-10-18 RX ORDER — IBUPROFEN 200 MG
1 TABLET ORAL
Qty: 30 | Refills: 0
Start: 2020-10-18

## 2020-10-18 RX ORDER — TAMSULOSIN HYDROCHLORIDE 0.4 MG/1
1 CAPSULE ORAL
Qty: 30 | Refills: 0
Start: 2020-10-18 | End: 2020-11-16

## 2020-10-18 RX ADMIN — CEFTRIAXONE 100 MILLIGRAM(S): 500 INJECTION, POWDER, FOR SOLUTION INTRAMUSCULAR; INTRAVENOUS at 05:30

## 2020-10-18 RX ADMIN — LISINOPRIL 40 MILLIGRAM(S): 2.5 TABLET ORAL at 05:32

## 2020-10-18 RX ADMIN — Medication 12.5 MILLIGRAM(S): at 05:30

## 2020-10-18 NOTE — ED ADULT NURSE REASSESSMENT NOTE - NS ED NURSE REASSESS COMMENT FT1
Pt. transported to CDU, report given to CDU RN Rosalva. Pt. safety and comfort measures maintained. Handoff and transfer of care occurred @ 9015.
Pt. A&Ox3, hard of hearing, no c/o pain or discomfort, no s/s of acute distress noted, safety maintained, Pt states will call for assistance when needed. POC reviewed. Pt and/or family reminded to keep watch over belongings as hospital is not responsible for lost property, pt and/or family members verbalized understanding

## 2020-10-18 NOTE — ED ADULT NURSE REASSESSMENT NOTE - NSIMPLEMENTINTERV_GEN_ALL_ED
Implemented All Fall Risk Interventions:  [place] to call system. Call bell, personal items and telephone within reach. Instruct patient to call for assistance. Room bathroom lighting operational. Non-slip footwear when patient is off stretcher. Physically safe environment: no spills, clutter or unnecessary equipment. Stretcher in lowest position, wheels locked, appropriate side rails in place. Provide visual cue, wrist band, yellow gown, etc. Monitor gait and stability. Monitor for mental status changes and reorient to person, place, and time. Review medications for side effects contributing to fall risk. Reinforce activity limits and safety measures with patient and family.

## 2020-10-18 NOTE — ED CDU PROVIDER SUBSEQUENT DAY NOTE - ATTENDING CONTRIBUTION TO CARE
Pt noted to have orchitis, placed in CDU for IV antibiotics. This morning, patient reevaluated, feeling better, denies testicular pain. Seen by urology last night who recommends antibiotics, nsaids, will f/u as outpatient. Patient notified of RP mass and need to f/u as outpatient.     I performed a history and physical exam of the patient and discussed their management with the advanced care provider. I reviewed the advanced care provider's note and agree with the documented findings and plan of care. My medical decision making and objective findings are found above.

## 2020-10-18 NOTE — ED CDU PROVIDER DISPOSITION NOTE - NSFOLLOWUPCLINICS_GEN_ALL_ED_FT
[facility]  Hematology/Oncology  [address] [place] [address]  Phone: [phone]  Fax:   Follow Up Time:

## 2020-10-18 NOTE — ED CDU PROVIDER DISPOSITION NOTE - PATIENT PORTAL LINK FT
You can access the FollowMyHealth Patient Portal offered by [facility] by registering at the following website: [email or web address]. By joining [facility]’s FollowMyHealth portal, you will also be able to view your health information using other applications (apps) compatible with our system.

## 2020-10-18 NOTE — ED CDU PROVIDER DISPOSITION NOTE - CARE PROVIDER_API CALL
Jack Barton  UROLOGY  [address] [place] [address]  Phone: [phone]  Fax: [phone]  Follow Up Time:     Waqar Luna  SURGERY  [address], [address] [place] [address]  Phone: [phone]  Fax: [phone]  Follow Up Time:

## 2020-10-18 NOTE — ED CDU PROVIDER DISPOSITION NOTE - NSFOLLOWUPINSTRUCTIONS_ED_ALL_ED_FT
Finish Keflex until all pills are finished  Take flomax daily, use motrin as needed  follow up urology within 1 week   follow up [facility] As soon as possible for abdominal mass  follow up acute care surgery for abdominal mass  if any fever or chills develop return to ER

## 2020-10-18 NOTE — ED CDU PROVIDER DISPOSITION NOTE - CLINICAL COURSE
patient is a 75 year old male who presented to [facility] with swollen painful testicle. patient was seen by urology, treated for orchitis with ceftriaxone.  patient was found to have retroperitoneal mass, patient is aware and will follow up [facility] and [facility] [place] for evaluation. patient was treated with insulin for hyperglycemia while here, has insulin at home.  patient is stable for discharge home patient is a 75 year old male who presented to [facility] with swollen painful testicle. patient was seen by urology, treated for orchitis with ceftriaxone.  patient was found to have retroperitoneal mass, patient is aware and will follow up [facility] and [facility] cancer [place] for evaluation.  patient is stable for discharge home

## 2020-10-18 NOTE — ED CDU PROVIDER SUBSEQUENT DAY NOTE - PROGRESS NOTE DETAILS
Patient seen at bedside by attending and myself. patient in obs for orchitis/cellulitis.  patient states feeling improvement.  Exam reveals swollen testicle, no current tenderness. patient states pain resolved.   patient is aware of retroperitoneal mass and need for follow up   d/c today

## 2020-10-18 NOTE — ED CDU PROVIDER SUBSEQUENT DAY NOTE - MEDICAL DECISION MAKING DETAILS
74 yo male placed in observation for treatment of orchitis, will re-eval am. if continued improvement and pain free will discharge on oral abx pain control flomax and fu with urology, ACS/ONC for retroperitoneal mass

## 2020-10-19 ENCOUNTER — NON-APPOINTMENT (OUTPATIENT)
Age: 75
End: 2020-10-19

## 2020-10-20 ENCOUNTER — RESULT REVIEW (OUTPATIENT)
Age: 75
End: 2020-10-20

## 2020-10-20 ENCOUNTER — APPOINTMENT (OUTPATIENT)
Dept: HEMATOLOGY ONCOLOGY | Facility: CLINIC | Age: 75
End: 2020-10-20
Payer: MEDICARE

## 2020-10-20 ENCOUNTER — OUTPATIENT (OUTPATIENT)
Dept: OUTPATIENT SERVICES | Facility: HOSPITAL | Age: 75
LOS: 1 days | Discharge: HOME | End: 2020-10-20

## 2020-10-20 VITALS
BODY MASS INDEX: 24.28 KG/M2 | SYSTOLIC BLOOD PRESSURE: 148 MMHG | HEIGHT: 63 IN | OXYGEN SATURATION: 98 % | DIASTOLIC BLOOD PRESSURE: 72 MMHG | HEART RATE: 73 BPM | TEMPERATURE: 97.3 F | WEIGHT: 137.02 LBS

## 2020-10-20 DIAGNOSIS — K46.9 UNSPECIFIED ABDOMINAL HERNIA WITHOUT OBSTRUCTION OR GANGRENE: Chronic | ICD-10-CM

## 2020-10-20 DIAGNOSIS — K40.20 BILATERAL INGUINAL HERNIA, WITHOUT OBSTRUCTION OR GANGRENE, NOT SPECIFIED AS RECURRENT: Chronic | ICD-10-CM

## 2020-10-20 DIAGNOSIS — K68.9 OTHER DISORDERS OF RETROPERITONEUM: ICD-10-CM

## 2020-10-20 DIAGNOSIS — Z00.00 ENCOUNTER FOR GENERAL ADULT MEDICAL EXAMINATION W/OUT ABNORMAL FINDINGS: ICD-10-CM

## 2020-10-20 LAB
-  AMIKACIN: SIGNIFICANT CHANGE UP
-  AMOXICILLIN/CLAVULANIC ACID: SIGNIFICANT CHANGE UP
-  AMPICILLIN/SULBACTAM: SIGNIFICANT CHANGE UP
-  AMPICILLIN: SIGNIFICANT CHANGE UP
-  AZTREONAM: SIGNIFICANT CHANGE UP
-  CEFAZOLIN: SIGNIFICANT CHANGE UP
-  CEFEPIME: SIGNIFICANT CHANGE UP
-  CEFOXITIN: SIGNIFICANT CHANGE UP
-  CEFTRIAXONE: SIGNIFICANT CHANGE UP
-  CIPROFLOXACIN: SIGNIFICANT CHANGE UP
-  ERTAPENEM: SIGNIFICANT CHANGE UP
-  GENTAMICIN: SIGNIFICANT CHANGE UP
-  IMIPENEM: SIGNIFICANT CHANGE UP
-  LEVOFLOXACIN: SIGNIFICANT CHANGE UP
-  MEROPENEM: SIGNIFICANT CHANGE UP
-  NITROFURANTOIN: SIGNIFICANT CHANGE UP
-  PIPERACILLIN/TAZOBACTAM: SIGNIFICANT CHANGE UP
-  TIGECYCLINE: SIGNIFICANT CHANGE UP
-  TOBRAMYCIN: SIGNIFICANT CHANGE UP
-  TRIMETHOPRIM/SULFAMETHOXAZOLE: SIGNIFICANT CHANGE UP
BASOPHILS # BLD AUTO: 0 K/UL — SIGNIFICANT CHANGE UP (ref 0–0.2)
BASOPHILS NFR BLD AUTO: 0.6 % — SIGNIFICANT CHANGE UP (ref 0–2)
CULTURE RESULTS: SIGNIFICANT CHANGE UP
EOSINOPHIL # BLD AUTO: 0.2 K/UL — SIGNIFICANT CHANGE UP (ref 0–0.5)
EOSINOPHIL NFR BLD AUTO: 2.6 % — SIGNIFICANT CHANGE UP (ref 0–6)
HCT VFR BLD CALC: 34.4 % — LOW (ref 39–50)
HGB BLD-MCNC: 11.3 G/DL — LOW (ref 13–17)
LYMPHOCYTES # BLD AUTO: 1.7 K/UL — SIGNIFICANT CHANGE UP (ref 1–3.3)
LYMPHOCYTES # BLD AUTO: 23.6 % — SIGNIFICANT CHANGE UP (ref 13–44)
MCHC RBC-ENTMCNC: 26.1 PG — LOW (ref 27–34)
MCHC RBC-ENTMCNC: 32.8 G/DL — SIGNIFICANT CHANGE UP (ref 32–36)
MCV RBC AUTO: 79.4 FL — LOW (ref 80–100)
METHOD TYPE: SIGNIFICANT CHANGE UP
MONOCYTES # BLD AUTO: 0.3 K/UL — SIGNIFICANT CHANGE UP (ref 0–0.9)
MONOCYTES NFR BLD AUTO: 4.2 % — SIGNIFICANT CHANGE UP (ref 2–14)
NEUTROPHILS # BLD AUTO: 5 K/UL — SIGNIFICANT CHANGE UP (ref 1.8–7.4)
NEUTROPHILS NFR BLD AUTO: 69.1 % — SIGNIFICANT CHANGE UP (ref 43–77)
ORGANISM # SPEC MICROSCOPIC CNT: SIGNIFICANT CHANGE UP
ORGANISM # SPEC MICROSCOPIC CNT: SIGNIFICANT CHANGE UP
PLATELET # BLD AUTO: 277 K/UL — SIGNIFICANT CHANGE UP (ref 150–400)
RBC # BLD: 4.34 M/UL — SIGNIFICANT CHANGE UP (ref 4.2–5.8)
RBC # FLD: 14.9 % — HIGH (ref 10.3–14.5)
SPECIMEN SOURCE: SIGNIFICANT CHANGE UP
WBC # BLD: 7.2 K/UL — SIGNIFICANT CHANGE UP (ref 3.8–10.5)
WBC # FLD AUTO: 7.2 K/UL — SIGNIFICANT CHANGE UP (ref 3.8–10.5)

## 2020-10-20 PROCEDURE — 99205 OFFICE O/P NEW HI 60 MIN: CPT

## 2020-10-20 NOTE — RESULTS/DATA
[FreeTextEntry1] : 10/17/20 CT A/P:  3.8 x 5 cm sized heterogeneously enhancing retroperitoneal soft tissue mass in the periaortic region displacing the aorta and causing left pelviectasis. 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 Other retroperitoneal mass cannot be excluded.  Urothelial enhancement of the renal pelvis and bladder wall concerning for urinary tract infection.  Enlarged right testis with large hydrocele, consistent with ultrasound findings.\par \par 10/17/20 U/S Scrotum:  Small, heterogeneous left testicle with complete absence of blood flow, compatible with torsion and testicular infarction/necrosis, and suggesting chronicity.  Markedly hypervascular and epididymis with normal echogenicity/echotexture, including large right hydrocele, most suggestive of of epididymoorchitis. Please note that torsion/spontaneous detorsion complex with reactive hyperemia is on the differential diagnosis.

## 2020-10-20 NOTE — ASSESSMENT
[FreeTextEntry1] : Retroperitoneal fibrosis  is a rare condition characterized by the presence of inflammatory and fibrous tissue in the retroperitoneum. The tissue is generally localized around the infrarenal portion of the abdominal aorta and the iliac arteries, often encasing the ureters or other abdominal organs.\par \par RPF may be primary idiopathic or secondary. Idiopathic disease accounts for 70 percent of cases and can be immunoglobulin G4 (IgG4)- or nonIgG4-related. Secondary RPF can result from infections, malignancy, drugs, retroperitoneal hemorrhage, or various other disorders.\par \par Secondary RPF can be caused by:\par \par Drugs – Ergot alkaloids (eg, methysergide, ergotamine) and dopamine agonists (eg, pergolide, methyldopa). The increased serotonin levels may lead to fibrotic reactions through proliferation of myofibroblasts and an increase in collagen matrix deposition.\par \par Biological agents such as etanercept, a soluble receptor that acts as a tumor necrosis factor (TNF)-alpha blocker, and infliximab, an anti-TNF-alpha monoclonal antibody. These agents may trigger a fibrotic reaction but the mechanism is unknown.\par \par Malignancy – Carcinoid, Hodgkin and non-Hodgkin lymphoma, sarcomas, colorectal, breast, prostate, and bladder carcinoma may cause secondary RPF. In most cases, RPF is due to an exuberant desmoplastic response associated with retroperitoneal metastases or a primary tumor. However, with carcinoid tumors, fibrosis is mediated by the release of serotonin and other fibrogenic growth factors (image 1 and image 2).\par \par Infections – Tuberculosis, histoplasmosis, actinomycosis.\par \par Radiation therapy for testicular seminoma, colon, or pancreatic cancer (image 3).\par \par Retroperitoneal hemorrhage.\par \par Surgery – Lymphadenectomy, colectomy, aortic aneurysmectomy.\par \par -there are case reports of RPF associated with secondary (AA) amyloidosis, mesenteric panniculitis, and different forms of histiocytosis, particularly Erdheim-Yauco disease

## 2020-10-20 NOTE — HISTORY OF PRESENT ILLNESS
[de-identified] : The patient was f/w a retroperitoneal soft tissue mass in October 2020 at the age of 75.  He presented to [facility] ED c/o right side scrotal swelling.  An U/S showed small, heterogeneous left testicle with complete absence of blood flow, compatible with torsion and testicular infarction/necrosis, and suggesting chronicity.  Markedly hypervascular and epididymis with normal echogenicity/echotexture, including large right hydrocele, most suggestive of of epididymoorchitis. Please note that torsion/spontaneous detorsion complex with reactive hyperemia is on the differential diagnosis.  Follow up CT A/P showed a 3.8 x 5 cm sized heterogeneously enhancing retroperitoneal soft tissue mass in the periaortic region displacing the aorta and causing left pelviectasis. 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 Other retroperitoneal mass cannot be excluded.  Urothelial enhancement of the renal pelvis and bladder wall concerning for urinary tract infection.  Enlarged right testis with large hydrocele, consistent with ultrasound findings.

## 2020-10-21 PROBLEM — I10 ESSENTIAL (PRIMARY) HYPERTENSION: Chronic | Status: ACTIVE | Noted: 2020-10-17

## 2020-10-21 PROBLEM — N40.0 BENIGN PROSTATIC HYPERPLASIA WITHOUT LOWER URINARY TRACT SYMPTOMS: Chronic | Status: ACTIVE | Noted: 2020-10-17

## 2020-10-26 ENCOUNTER — APPOINTMENT (OUTPATIENT)
Dept: SURGERY | Facility: CLINIC | Age: 75
End: 2020-10-26
Payer: MEDICARE

## 2020-10-26 VITALS
WEIGHT: 141 LBS | DIASTOLIC BLOOD PRESSURE: 79 MMHG | OXYGEN SATURATION: 100 % | HEIGHT: 63 IN | HEART RATE: 76 BPM | BODY MASS INDEX: 24.98 KG/M2 | SYSTOLIC BLOOD PRESSURE: 165 MMHG | TEMPERATURE: 97.3 F

## 2020-10-26 PROCEDURE — 99072 ADDL SUPL MATRL&STAF TM PHE: CPT

## 2020-10-26 PROCEDURE — 99214 OFFICE O/P EST MOD 30 MIN: CPT

## 2020-10-26 RX ORDER — TAMSULOSIN HYDROCHLORIDE 0.4 MG/1
0.4 CAPSULE ORAL
Refills: 0 | Status: ACTIVE | COMMUNITY

## 2020-10-26 NOTE — REVIEW OF SYSTEMS
[Fever] : no fever [Chills] : no chills [Eye Pain] : no eye pain [Red Eyes] : eyes not red [Earache] : no earache [Loss Of Hearing] : no hearing loss [Heart Rate Is Slow] : the heart rate was not slow [Heart Rate Is Fast] : the heart rate was not fast [Chest Pain] : no chest pain [Palpitations] : no palpitations [Shortness Of Breath] : no shortness of breath [Wheezing] : no wheezing [Cough] : no cough [SOB on Exertion] : no shortness of breath during exertion [Abdominal Pain] : no abdominal pain [Vomiting] : no vomiting [Constipation] : no constipation [Diarrhea] : no diarrhea [Dysuria] : no dysuria [Incontinence] : no incontinence [Hesitancy] : no urinary hesitancy [Nocturia] : no nocturia [Arthralgias] : no arthralgias [Joint Pain] : no joint pain [Joint Swelling] : no joint swelling [Joint Stiffness] : no joint stiffness [Skin Lesions] : no skin lesions [Skin Wound] : no skin wound [Itching] : no itching [Change In A Mole] : no change in a mole [Confused] : no confusion [Convulsions] : no convulsions [Dizziness] : no dizziness [Fainting] : no fainting [Suicidal] : not suicidal [Sleep Disturbances] : no sleep disturbances [Anxiety] : no anxiety [Depression] : no depression [Proptosis] : no proptosis [Hot Flashes] : no hot flashes [Easy Bleeding] : no tendency for easy bleeding [Easy Bruising] : no tendency for easy bruising

## 2020-10-26 NOTE — HISTORY OF PRESENT ILLNESS
[de-identified] : Mr. TRAVIS RODRIGUEZ is a 75 year-old man with history of hypertension, BPH, and inguinal hernia repair with mesh. Presented to [facility] last week with complaints of testicular pain, and swelling, found to have fibrosis and lymphadenopathy in the retroperitoneum on CT. Unknown etiology. Presents today for evaluation and possible (?) resection. States he was seen at the [facility] recently and was told it is not cancer, although he states no additional testing was done.\par No unintended weight changes recently.\par No fevers/chills.\par No nausea/vomiting.

## 2020-10-26 NOTE — ASSESSMENT
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 Possibly reactive vs. malignancy.

## 2020-10-26 NOTE — PHYSICAL EXAM
[JVD] : no jugular venous distention  [Normal Thyroid] : the thyroid was normal [Normal Breath Sounds] : Normal breath sounds [Normal Heart Sounds] : normal heart sounds [Normal Rate and Rhythm] : normal rate and rhythm [Abdominal Masses] : No abdominal masses [Abdomen Tenderness] : ~T ~M No abdominal tenderness [No HSM] : no hepatosplenomegaly [No Rash or Lesion] : No rash or lesion [Alert] : alert [Oriented to Person] : oriented to person [Oriented to Place] : oriented to place [Oriented to Time] : oriented to time [Calm] : calm [de-identified] : well-appearing, NAD [de-identified] : BRAVO, moist membranes [de-identified] : soft, ND, NT, +BS [de-identified] : no CVAT [de-identified] : full ROM, no gross deformities

## 2020-10-27 LAB
AFP-TM SERPL-MCNC: 2.5 NG/ML
ALBUMIN MFR SERPL ELPH: 52.8 %
ALBUMIN SERPL ELPH-MCNC: 4.2 G/DL
ALBUMIN SERPL-MCNC: 3.6 G/DL
ALBUMIN/GLOB SERPL: 1.1 RATIO
ALP BLD-CCNC: 83 U/L
ALPHA1 GLOB MFR SERPL ELPH: 6.2 %
ALPHA1 GLOB SERPL ELPH-MCNC: 0.4 G/DL
ALPHA2 GLOB MFR SERPL ELPH: 11.4 %
ALPHA2 GLOB SERPL ELPH-MCNC: 0.8 G/DL
ALT SERPL-CCNC: 27 U/L
ANA SER IF-ACNC: NEGATIVE
ANCA AB SER-IMP: ABNORMAL
ANION GAP SERPL CALC-SCNC: 17 MMOL/L
AST SERPL-CCNC: 34 U/L
B-GLOBULIN MFR SERPL ELPH: 9.6 %
B-GLOBULIN SERPL ELPH-MCNC: 0.7 G/DL
BILIRUB SERPL-MCNC: 0.3 MG/DL
BUN SERPL-MCNC: 24 MG/DL
C-ANCA SER-ACNC: NEGATIVE
CALCIUM SERPL-MCNC: 9.3 MG/DL
CANCER AG125 SERPL-ACNC: 9 U/ML
CANCER AG19-9 SERPL-ACNC: <2 U/ML
CANCER AG27-29 SERPL-ACNC: 13.6 U/ML
CEA SERPL-MCNC: 2.8 NG/ML
CHLORIDE SERPL-SCNC: 99 MMOL/L
CO2 SERPL-SCNC: 22 MMOL/L
CREAT SERPL-MCNC: 1.12 MG/DL
CRP SERPL-MCNC: 2.38 MG/DL
DEPRECATED KAPPA LC FREE/LAMBDA SER: 0.25 RATIO
DEPRECATED KAPPA LC FREE/LAMBDA SER: 0.25 RATIO
ERYTHROCYTE [SEDIMENTATION RATE] IN BLOOD BY WESTERGREN METHOD: 57 MM/HR
GAMMA GLOB FLD ELPH-MCNC: 1.4 G/DL
GAMMA GLOB MFR SERPL ELPH: 20 %
GLUCOSE SERPL-MCNC: 120 MG/DL
HBV CORE IGG+IGM SER QL: NONREACTIVE
HBV SURFACE AB SER QL: NONREACTIVE
HBV SURFACE AG SER QL: NONREACTIVE
HCV AB SER QL: NONREACTIVE
HCV S/CO RATIO: 0.32 S/CO
IGA SER QL IEP: 210 MG/DL
IGA SER QL IEP: 210 MG/DL
IGG SER QL IEP: 1602 MG/DL
IGG4 SER-MCNC: 12.3 MG/DL
IGM SER QL IEP: 51 MG/DL
IGM SER QL IEP: 51 MG/DL
INR PPP: 1 RATIO
INTERPRETATION SERPL IEP-IMP: NORMAL
KAPPA LC CSF-MCNC: 15.46 MG/DL
KAPPA LC CSF-MCNC: 15.73 MG/DL
KAPPA LC SERPL-MCNC: 3.85 MG/DL
KAPPA LC SERPL-MCNC: 3.94 MG/DL
M PROTEIN MFR SERPL ELPH: NORMAL
M PROTEIN SPEC IFE-MCNC: NORMAL
MAGNESIUM SERPL-MCNC: 2.1 MG/DL
MONOCLON BAND OBS SERPL: NORMAL
P-ANCA TITR SER IF: NEGATIVE
POTASSIUM SERPL-SCNC: 4.6 MMOL/L
PROT SERPL-MCNC: 6.9 G/DL
PROT SERPL-MCNC: 6.9 G/DL
PROT SERPL-MCNC: 7.2 G/DL
PT BLD: 11.8 SEC
SODIUM SERPL-SCNC: 137 MMOL/L
T3FREE SERPL-MCNC: 2.41 PG/ML
T4 FREE SERPL-MCNC: 1.3 NG/DL
THYROGLOB AB SERPL-ACNC: <20 IU/ML
THYROPEROXIDASE AB SERPL IA-ACNC: 10.8 IU/ML
TSH SERPL-ACNC: 0.38 UIU/ML

## 2020-11-30 ENCOUNTER — OUTPATIENT (OUTPATIENT)
Dept: OUTPATIENT SERVICES | Facility: HOSPITAL | Age: 75
LOS: 1 days | Discharge: HOME | End: 2020-11-30

## 2020-11-30 DIAGNOSIS — K68.9 OTHER DISORDERS OF RETROPERITONEUM: ICD-10-CM

## 2020-11-30 DIAGNOSIS — K46.9 UNSPECIFIED ABDOMINAL HERNIA WITHOUT OBSTRUCTION OR GANGRENE: Chronic | ICD-10-CM

## 2020-11-30 DIAGNOSIS — K40.20 BILATERAL INGUINAL HERNIA, WITHOUT OBSTRUCTION OR GANGRENE, NOT SPECIFIED AS RECURRENT: Chronic | ICD-10-CM

## 2020-12-02 ENCOUNTER — APPOINTMENT (OUTPATIENT)
Dept: HEMATOLOGY ONCOLOGY | Facility: CLINIC | Age: 75
End: 2020-12-02
Payer: MEDICARE

## 2020-12-02 DIAGNOSIS — N13.5 CROSSING VESSEL AND STRICTURE OF URETER W/OUT HYDRONEPHROSIS: ICD-10-CM

## 2020-12-02 PROCEDURE — 99443: CPT

## 2020-12-02 NOTE — HISTORY OF PRESENT ILLNESS
[de-identified] : The patient was f/w a retroperitoneal soft tissue mass in October 2020 at the age of 75.  He presented to [facility] ED c/o right side scrotal swelling.  An U/S showed small, heterogeneous left testicle with complete absence of blood flow, compatible with torsion and testicular infarction/necrosis, and suggesting chronicity.  Markedly hypervascular and epididymis with normal echogenicity/echotexture, including large right hydrocele, most suggestive of of epididymoorchitis. Please note that torsion/spontaneous detorsion complex with reactive hyperemia is on the differential diagnosis.  Follow up CT A/P showed a 3.8 x 5 cm sized heterogeneously enhancing retroperitoneal soft tissue mass in the periaortic region displacing the aorta and causing left pelviectasis. 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 Other retroperitoneal mass cannot be excluded.  Urothelial enhancement of the renal pelvis and bladder wall concerning for urinary tract infection.  Enlarged right testis with large hydrocele, consistent with ultrasound findings.

## 2020-12-02 NOTE — ASSESSMENT
[FreeTextEntry1] : Retroperitoneal fibrosis  is a rare condition characterized by the presence of inflammatory and fibrous tissue in the retroperitoneum. The tissue is generally localized around the infrarenal portion of the abdominal aorta and the iliac arteries, often encasing the ureters or other abdominal organs.\par \par RPF may be primary idiopathic or secondary. Idiopathic disease accounts for 70 percent of cases and can be immunoglobulin G4 (IgG4)- or nonIgG4-related. Secondary RPF can result from infections, malignancy, drugs, retroperitoneal hemorrhage, or various other disorders.\par \par Secondary RPF can be caused by:\par \par Drugs – Ergot alkaloids (eg, methysergide, ergotamine) and dopamine agonists (eg, pergolide, methyldopa). The increased serotonin levels may lead to fibrotic reactions through proliferation of myofibroblasts and an increase in collagen matrix deposition.\par \par Biological agents such as etanercept, a soluble receptor that acts as a tumor necrosis factor (TNF)-alpha blocker, and infliximab, an anti-TNF-alpha monoclonal antibody. These agents may trigger a fibrotic reaction but the mechanism is unknown.\par \par Malignancy – Carcinoid, Hodgkin and non-Hodgkin lymphoma, sarcomas, colorectal, breast, prostate, and bladder carcinoma may cause secondary RPF. In most cases, RPF is due to an exuberant desmoplastic response associated with retroperitoneal metastases or a primary tumor. However, with carcinoid tumors, fibrosis is mediated by the release of serotonin and other fibrogenic growth factors (image 1 and image 2).\par \par Infections – Tuberculosis, histoplasmosis, actinomycosis.\par \par Radiation therapy for testicular seminoma, colon, or pancreatic cancer (image 3).\par \par Retroperitoneal hemorrhage.\par \par Surgery – Lymphadenectomy, colectomy, aortic aneurysmectomy.\par \par -there are case reports of RPF associated with secondary (AA) amyloidosis, mesenteric panniculitis, and different forms of histiocytosis, particularly Erdheim-Irwin disease

## 2021-01-07 NOTE — ED ADULT TRIAGE NOTE - ESI TRIAGE ACUITY LEVEL, MLM
Infectious Diseases Daily Progress Note      Patient's Name: Marsha Park   Date of Service: 1/7/2021     Date of admission: 12/20/2020                Hospital Day: 19  Principal Diagnosis:                             Marsha Park who is a 38 year old female admitted 12/20/2020  9:27 PM with abd pain due to perforated viscus causing  acute bacterial peritonitis complicated by multiple intra-abdominal abscesses                  Scheduled Medications   sodium bicarbonate, 1,300 mg, 4x Daily  enoxaparin, 160 mg, Q12H  sodium chloride, ,   furosemide, 20 mg, BID  heparin (porcine), 5 mL, Daily  heparin, 500 Units, Q30 Days  aztreonam, 2 g, 3 times per day  fluconazole (DIFLUCAN) IVPB, 400 mg, Q Evening  VANCOMYCIN - PHARMACIST MONITORED, , See Admin Instructions  vancomycin (VANCOCIN) IVPB, 1,000 mg, Q Evening  famotidine (PEPCID) injection, 20 mg, 2 times per day  sodium hypochlorite, 1 application, Daily  melatonin, 9 mg, Nightly  fat emulsion, 250 mL, Once per day on Mon Wed Fri  sodium chloride (PF), 10 mL, 3 times per day  metroNIDAZOLE (FLAGYL) IVPB, 500 mg, 3 times per day  nystatin, , 2 times per day  Potassium Standard Replacement Protocol, , See Admin Instructions  Magnesium Standard Replacement Protocol, , See Admin Instructions  TPN - DIETICIAN/PHARMACIST managed, , See Admin Instructions      Infusions:  • parenteral nutrition adult custom central     • parenteral nutrition adult custom central 133 mL/hr at 01/06/21 2201   • dextrose 10 % infusion       Past Medical History, Social History, Medications and Allergies reviewed.   Reviewed Pertinent: Laboratory studies, radiographic studies, medications, and recent progress notes.     Subjective:  Complains of left leg swelling and pain.  Ultrasound shows DVT in the left flank.   on TPN.  Afebrile.  WBC count is rising despite broadening antibiotic therapy yesterday.    Objective:    VITAL SIGNS  Temp  Min: 97.6 °F (36.4 °C)  Max: 98 °F (36.7 °C)  Temp:   [97.6 °F (36.4 °C)-98 °F (36.7 °C)] 98 °F (36.7 °C)  Heart Rate:  [102-127] 127  Resp:  [18-20] 18  BP: (124-130)/(71-92) 130/71  Physical examination:  General : Awake, NAD, appears uncomfortable, obese  Head:  PERRL, No icterus, no oral thrush  Neck supple, no LAD   Pulm: Clear to auscultation, no wheezes, no rales  GI: Abdomen is soft , midline abdominal wound is covered with dressing.  Left added ostomy bag has semi solid brown stool.  Right-sided abdominal JOHNNY drain with tan colored purulent looking fluid  CVS:  Pulse regular, S1, S2 normal, no m/g/r   Ext:  Left leg nonpitting edema.  Skin: No rashes  MSK: No major joint swelling , pain, erythema, effusion.         Laboratory data, microbiology, imaging:    Recent Labs   Lab 01/07/21  0336 01/06/21  1209 01/04/21  0424   WBC 27.6* 18.2* 15.4*   HGB 8.9* 7.9* 7.8*   HCT 27.3* 25.0* 25.2*    111* 130*     Recent Labs   Lab 01/07/21  0942 01/07/21  0336 01/06/21  1209   SODIUM 137 137 143   POTASSIUM 4.2 4.3 4.1   BUN 80* 80* 76*   CREATININE 2.21* 1.98* 2.02*   GLUCOSE 165* 175* 124*   CALCIUM 9.3 9.5 9.6   ALBUMIN 2.3* 2.3* 2.3*   AST 14 16 10   GPT 11 11 9   ALKPT 101 100 82   BILIRUBIN 0.5 0.5 0.5       Recent Labs   Lab 01/07/21  0942 01/07/21  0336 01/06/21  1209 01/04/21  0424 01/03/21  0345   AST 14 16 10 8 9   GPT 11 11 9 8 10   ALKPT 101 100 82 82 85   BILIRUBIN 0.5 0.5 0.5 0.5 0.6     No results found  No results found   COVID-19 PCR was negative on 12/16/2020    Microbiology:  Blood cultures from 12/16/2020 are negative  Bacterial culture from 12/16/20 labeled as abdominal fluid is positive for Clostridium perfringens    Radiology/Imaging:   US Lower Extremity Venous Duplex Left   Final Result by Howie Pickard MD (01/07 0322)   IMPRESSION:      Extensive acute deep venous thrombosis involving the entire left lower   extremity venous system which extends into the calf veins, as well as the   contralateral common femoral vein, which is partially  2 occluded.      Findings are conveyed verbally by phone to Vanessa Farias RN on 1/7/2021   2:53 AM.       I have personally reviewed the images and modified the resident's report as   necessary.      XR ABDOMEN AP KUB   Final Result by Francisco J Garcia DO (01/05 1936)   IMPRESSION:      Mildly dilated small bowel measuring up to 4.5 cm. Findings may relate to   postoperative ileus, versus developing small bowel obstruction. Continued   radiographic surveillance is recommended.      Moderate gaseous distention of the stomach.      I have personally reviewed the images and modified the resident's report as   necessary.      CT ABDOMEN PELVIS WO CONTRAST   Final Result by Luke A Falesch, MD (12/31 1640)   IMPRESSION:      1.  Persistent pelvic fluid collection, similar in comparison to prior   imaging. Unchanged right anterior lower abdominal wall percutaneous   catheter with tip terminating in the left lower abdominal quadrant.   2.  Slight interval increase in mildly distended loops of small bowel with   air-fluid levels. No focal bowel narrowing to suggest a transition point,   probable ileus.   3.  4.  Persisting/unchanged clustered mesorectal fat gas locules.   4.  Unremarkable postoperative changes of partial rectosigmoid resection,   with transverse loop colostomy otherwise.      I have personally reviewed the images and modified the resident's report as   necessary.      CT ABDOMEN PELVIS WO CONTRAST   Final Result by Aj Diaz MD (12/27 9522)   IMPRESSION:      1.  Similar size and appearance of fluid collection in the mid pelvis   adjacent to the percutaneous drainage catheter. No new fluid collections   are present. Small bubbles of gas persist in the extraperitoneal mesorectal   fat of the caudal pelvis, unchanged.      I have personally reviewed the images and modified the resident's report as   necessary.      IR PICC   Final Result by Clay Duenas RN (12/27 1005)   Non-reportable by Radiologist.       CT ABDOMEN PELVIS WO CONTRAST   Final Result by Aj Diaz MD (12/23 1523)   IMPRESSION:      1.  Postsurgical findings of exploratory partial colectomy with diverting   colostomy, and omentectomy with associated expected postoperative   alterations.    2.  A single dominant nonspecific postoperative fluid collection in the   caudal midpelvis is present measuring 8.5 x 2.5 x 3.5 cm. This is adjacent   to the percutaneous drainage catheter. No oral contrast is demonstrated   within this fluid collection. No other dominant fluid collections are   present. Scattered small bubbles of gas are present in the peritoneal   cavity and a small amount of gas persists in the extraperitoneal mesorectal   fascia.   3.  Gallbladder distention but with no wall thickening or pericholecystic   inflammation.      I have personally reviewed the images and modified the resident's report as   necessary.      XR CHEST AP OR PA   Final Result by Luke A Falesch, MD (12/22 1629)   IMPRESSION:       Stable exam, status post extubation.      I have personally reviewed the images and modified the resident's report as   necessary.      IR MISC DRAINAGE PROCEDURE    (Results Pending)   CT ABDOMEN PELVIS WO CONTRAST    (Results Pending)     Chest x-ray 12/19/2020 was unremarkable.    CT scan of the abdomen pelvis from 12/16/2020  IMPRESSION: Extensive free intraperitoneal air.    Extraluminal air root of mesentery extending inferiorly into the perirectal  Region. Findings a of apparent extraluminal air and fluid mid abdomen consistent  with abscess. Findings may be on the basis of diverticulitis.      Assessment:     Perforated viscus causing acute bacterial peritonitis complicated by multiple intra-abdominal abscesses s/p exp lap, sigmoid and rectal resection, transverse loop colostomy, lysis of adhesion, drainage of retroperitoneal abscesses on 12/17/2020    Left leg acute DVT  Severe protein calorie malnutrition,  hypoalbuminemia  Metastatic cervical cancer status post chemotherapy and radiation, under the care Dr. Lebron  Acute kidney injury likely secondary to acute interstitial nephritis.  Nephrology following     Plan & Recommendations:     Continue IV aztreonam, IV vancomycin, Diflucan, IV metronidazole.  Anticoagulation for the full therapeutic dose of Lovenox            M. Rayray Curran MD  Infectious Diseases  [phone] (P)   [phone] (O)  1/7/2021     2:59 PM

## 2021-06-10 ENCOUNTER — INPATIENT (INPATIENT)
Facility: HOSPITAL | Age: 76
LOS: 4 days | Discharge: HOME | DRG: 812 | End: 2021-06-15
Attending: INTERNAL MEDICINE | Admitting: INTERNAL MEDICINE
Payer: MEDICARE

## 2021-06-10 VITALS
SYSTOLIC BLOOD PRESSURE: 113 MMHG | WEIGHT: 145.06 LBS | DIASTOLIC BLOOD PRESSURE: 56 MMHG | TEMPERATURE: 98 F | RESPIRATION RATE: 16 BRPM | HEART RATE: 85 BPM | OXYGEN SATURATION: 99 % | HEIGHT: 66 IN

## 2021-06-10 DIAGNOSIS — Z29.9 ENCOUNTER FOR PROPHYLACTIC MEASURES, UNSPECIFIED: ICD-10-CM

## 2021-06-10 DIAGNOSIS — D64.9 ANEMIA, UNSPECIFIED: ICD-10-CM

## 2021-06-10 DIAGNOSIS — K40.20 BILATERAL INGUINAL HERNIA, WITHOUT OBSTRUCTION OR GANGRENE, NOT SPECIFIED AS RECURRENT: Chronic | ICD-10-CM

## 2021-06-10 DIAGNOSIS — N40.0 BENIGN PROSTATIC HYPERPLASIA WITHOUT LOWER URINARY TRACT SYMPTOMS: ICD-10-CM

## 2021-06-10 DIAGNOSIS — K92.2 GASTROINTESTINAL HEMORRHAGE, UNSPECIFIED: ICD-10-CM

## 2021-06-10 DIAGNOSIS — E87.1 HYPO-OSMOLALITY AND HYPONATREMIA: ICD-10-CM

## 2021-06-10 DIAGNOSIS — I10 ESSENTIAL (PRIMARY) HYPERTENSION: ICD-10-CM

## 2021-06-10 DIAGNOSIS — R17 UNSPECIFIED JAUNDICE: ICD-10-CM

## 2021-06-10 DIAGNOSIS — K46.9 UNSPECIFIED ABDOMINAL HERNIA WITHOUT OBSTRUCTION OR GANGRENE: Chronic | ICD-10-CM

## 2021-06-10 LAB
ABO RH CONFIRMATION: SIGNIFICANT CHANGE UP
ALBUMIN SERPL ELPH-MCNC: 3.8 G/DL — SIGNIFICANT CHANGE UP (ref 3.5–5)
ALP SERPL-CCNC: 70 U/L — SIGNIFICANT CHANGE UP (ref 40–120)
ALT FLD-CCNC: 22 U/L DA — SIGNIFICANT CHANGE UP (ref 10–60)
ANION GAP SERPL CALC-SCNC: 7 MMOL/L — SIGNIFICANT CHANGE UP (ref 5–17)
ANION GAP SERPL CALC-SCNC: 9 MMOL/L — SIGNIFICANT CHANGE UP (ref 5–17)
ANISOCYTOSIS BLD QL: SLIGHT — SIGNIFICANT CHANGE UP
APTT BLD: 24.1 SEC — LOW (ref 27.5–35.5)
AST SERPL-CCNC: 18 U/L — SIGNIFICANT CHANGE UP (ref 10–40)
BASOPHILS # BLD AUTO: 0 K/UL — SIGNIFICANT CHANGE UP (ref 0–0.2)
BASOPHILS NFR BLD AUTO: 0 % — SIGNIFICANT CHANGE UP (ref 0–2)
BILIRUB SERPL-MCNC: 2.3 MG/DL — HIGH (ref 0.2–1.2)
BLD GP AB SCN SERPL QL: SIGNIFICANT CHANGE UP
BUN SERPL-MCNC: 23 MG/DL — HIGH (ref 7–18)
BUN SERPL-MCNC: 24 MG/DL — HIGH (ref 7–18)
CALCIUM SERPL-MCNC: 8.1 MG/DL — LOW (ref 8.4–10.5)
CALCIUM SERPL-MCNC: 8.2 MG/DL — LOW (ref 8.4–10.5)
CHLORIDE SERPL-SCNC: 90 MMOL/L — LOW (ref 96–108)
CHLORIDE SERPL-SCNC: 98 MMOL/L — SIGNIFICANT CHANGE UP (ref 96–108)
CHOLEST SERPL-MCNC: 169 MG/DL — SIGNIFICANT CHANGE UP
CO2 SERPL-SCNC: 23 MMOL/L — SIGNIFICANT CHANGE UP (ref 22–31)
CO2 SERPL-SCNC: 24 MMOL/L — SIGNIFICANT CHANGE UP (ref 22–31)
CREAT ?TM UR-MCNC: 18 MG/DL — SIGNIFICANT CHANGE UP
CREAT SERPL-MCNC: 0.85 MG/DL — SIGNIFICANT CHANGE UP (ref 0.5–1.3)
CREAT SERPL-MCNC: 0.92 MG/DL — SIGNIFICANT CHANGE UP (ref 0.5–1.3)
EOSINOPHIL # BLD AUTO: 0 K/UL — SIGNIFICANT CHANGE UP (ref 0–0.5)
EOSINOPHIL NFR BLD AUTO: 0 % — SIGNIFICANT CHANGE UP (ref 0–6)
GLUCOSE BLDC GLUCOMTR-MCNC: 119 MG/DL — HIGH (ref 70–99)
GLUCOSE SERPL-MCNC: 97 MG/DL — SIGNIFICANT CHANGE UP (ref 70–99)
GLUCOSE SERPL-MCNC: 99 MG/DL — SIGNIFICANT CHANGE UP (ref 70–99)
HCT VFR BLD CALC: 20.5 % — CRITICAL LOW (ref 39–50)
HCT VFR BLD CALC: 22.4 % — LOW (ref 39–50)
HDLC SERPL-MCNC: 48 MG/DL — SIGNIFICANT CHANGE UP
HGB BLD-MCNC: 6.6 G/DL — CRITICAL LOW (ref 13–17)
HGB BLD-MCNC: 7.3 G/DL — LOW (ref 13–17)
INR BLD: 1.04 RATIO — SIGNIFICANT CHANGE UP (ref 0.88–1.16)
IRON SATN MFR SERPL: 121 UG/DL — SIGNIFICANT CHANGE UP (ref 65–170)
IRON SATN MFR SERPL: 70 % — HIGH (ref 20–55)
LIDOCAIN IGE QN: 223 U/L — SIGNIFICANT CHANGE UP (ref 73–393)
LIPID PNL WITH DIRECT LDL SERPL: 108 MG/DL — HIGH
LYMPHOCYTES # BLD AUTO: 6.32 K/UL — HIGH (ref 1–3.3)
LYMPHOCYTES # BLD AUTO: 67 % — HIGH (ref 13–44)
MANUAL SMEAR VERIFICATION: SIGNIFICANT CHANGE UP
MCHC RBC-ENTMCNC: 25.7 PG — LOW (ref 27–34)
MCHC RBC-ENTMCNC: 25.7 PG — LOW (ref 27–34)
MCHC RBC-ENTMCNC: 32.2 GM/DL — SIGNIFICANT CHANGE UP (ref 32–36)
MCHC RBC-ENTMCNC: 32.6 GM/DL — SIGNIFICANT CHANGE UP (ref 32–36)
MCV RBC AUTO: 78.9 FL — LOW (ref 80–100)
MCV RBC AUTO: 79.8 FL — LOW (ref 80–100)
MICROCYTES BLD QL: SLIGHT — SIGNIFICANT CHANGE UP
MONOCYTES # BLD AUTO: 0 K/UL — SIGNIFICANT CHANGE UP (ref 0–0.9)
MONOCYTES NFR BLD AUTO: 0 % — LOW (ref 2–14)
NEUTROPHILS # BLD AUTO: 2.83 K/UL — SIGNIFICANT CHANGE UP (ref 1.8–7.4)
NEUTROPHILS NFR BLD AUTO: 30 % — LOW (ref 43–77)
NON HDL CHOLESTEROL: 121 MG/DL — SIGNIFICANT CHANGE UP
NRBC # BLD: 0 /100 WBCS — SIGNIFICANT CHANGE UP (ref 0–0)
NRBC # BLD: 0 /100 — SIGNIFICANT CHANGE UP (ref 0–0)
OB PNL STL: NEGATIVE — SIGNIFICANT CHANGE UP
OVALOCYTES BLD QL SMEAR: SLIGHT — SIGNIFICANT CHANGE UP
PLAT MORPH BLD: NORMAL — SIGNIFICANT CHANGE UP
PLATELET # BLD AUTO: 181 K/UL — SIGNIFICANT CHANGE UP (ref 150–400)
PLATELET # BLD AUTO: 242 K/UL — SIGNIFICANT CHANGE UP (ref 150–400)
POIKILOCYTOSIS BLD QL AUTO: SLIGHT — SIGNIFICANT CHANGE UP
POLYCHROMASIA BLD QL SMEAR: SLIGHT — SIGNIFICANT CHANGE UP
POTASSIUM SERPL-MCNC: 3.5 MMOL/L — SIGNIFICANT CHANGE UP (ref 3.5–5.3)
POTASSIUM SERPL-MCNC: 3.7 MMOL/L — SIGNIFICANT CHANGE UP (ref 3.5–5.3)
POTASSIUM SERPL-SCNC: 3.5 MMOL/L — SIGNIFICANT CHANGE UP (ref 3.5–5.3)
POTASSIUM SERPL-SCNC: 3.7 MMOL/L — SIGNIFICANT CHANGE UP (ref 3.5–5.3)
PROT SERPL-MCNC: 6.8 G/DL — SIGNIFICANT CHANGE UP (ref 6–8.3)
PROTHROM AB SERPL-ACNC: 12.3 SEC — SIGNIFICANT CHANGE UP (ref 10.6–13.6)
RBC # BLD: 2.57 M/UL — LOW (ref 4.2–5.8)
RBC # BLD: 2.84 M/UL — LOW (ref 4.2–5.8)
RBC # FLD: 19.9 % — HIGH (ref 10.3–14.5)
RBC # FLD: 19.9 % — HIGH (ref 10.3–14.5)
RBC BLD AUTO: ABNORMAL
SARS-COV-2 RNA SPEC QL NAA+PROBE: SIGNIFICANT CHANGE UP
SODIUM SERPL-SCNC: 122 MMOL/L — LOW (ref 135–145)
SODIUM SERPL-SCNC: 129 MMOL/L — LOW (ref 135–145)
SODIUM UR-SCNC: 70 MMOL/L — SIGNIFICANT CHANGE UP
SPHEROCYTES BLD QL SMEAR: SLIGHT — SIGNIFICANT CHANGE UP
TIBC SERPL-MCNC: 174 UG/DL — LOW (ref 250–450)
TRIGL SERPL-MCNC: 63 MG/DL — SIGNIFICANT CHANGE UP
TROPONIN I SERPL-MCNC: <0.015 NG/ML — SIGNIFICANT CHANGE UP (ref 0–0.04)
TSH SERPL-MCNC: 0.63 UU/ML — SIGNIFICANT CHANGE UP (ref 0.34–4.82)
UIBC SERPL-MCNC: 53 UG/DL — LOW (ref 110–370)
VARIANT LYMPHS # BLD: 3 % — SIGNIFICANT CHANGE UP (ref 0–6)
WBC # BLD: 6.72 K/UL — SIGNIFICANT CHANGE UP (ref 3.8–10.5)
WBC # BLD: 9.44 K/UL — SIGNIFICANT CHANGE UP (ref 3.8–10.5)
WBC # FLD AUTO: 6.72 K/UL — SIGNIFICANT CHANGE UP (ref 3.8–10.5)
WBC # FLD AUTO: 9.44 K/UL — SIGNIFICANT CHANGE UP (ref 3.8–10.5)

## 2021-06-10 PROCEDURE — 74174 CTA ABD&PLVS W/CONTRAST: CPT | Mod: 26

## 2021-06-10 PROCEDURE — 93010 ELECTROCARDIOGRAM REPORT: CPT

## 2021-06-10 PROCEDURE — 71045 X-RAY EXAM CHEST 1 VIEW: CPT | Mod: 26

## 2021-06-10 PROCEDURE — 99285 EMERGENCY DEPT VISIT HI MDM: CPT

## 2021-06-10 RX ORDER — PANTOPRAZOLE SODIUM 20 MG/1
40 TABLET, DELAYED RELEASE ORAL EVERY 12 HOURS
Refills: 0 | Status: DISCONTINUED | OUTPATIENT
Start: 2021-06-10 | End: 2021-06-14

## 2021-06-10 RX ORDER — TAMSULOSIN HYDROCHLORIDE 0.4 MG/1
0.4 CAPSULE ORAL AT BEDTIME
Refills: 0 | Status: DISCONTINUED | OUTPATIENT
Start: 2021-06-10 | End: 2021-06-15

## 2021-06-10 RX ORDER — SODIUM CHLORIDE 9 MG/ML
1000 INJECTION INTRAMUSCULAR; INTRAVENOUS; SUBCUTANEOUS ONCE
Refills: 0 | Status: COMPLETED | OUTPATIENT
Start: 2021-06-10 | End: 2021-06-10

## 2021-06-10 RX ORDER — TIOTROPIUM BROMIDE 18 UG/1
1 CAPSULE ORAL; RESPIRATORY (INHALATION)
Qty: 0 | Refills: 0 | DISCHARGE

## 2021-06-10 RX ORDER — KETOROLAC TROMETHAMINE 0.5 %
1 DROPS OPHTHALMIC (EYE)
Refills: 0 | Status: DISCONTINUED | OUTPATIENT
Start: 2021-06-10 | End: 2021-06-15

## 2021-06-10 RX ORDER — TAMSULOSIN HYDROCHLORIDE 0.4 MG/1
1 CAPSULE ORAL
Qty: 0 | Refills: 0 | DISCHARGE

## 2021-06-10 RX ORDER — SENNA PLUS 8.6 MG/1
1 TABLET ORAL
Qty: 0 | Refills: 0 | DISCHARGE

## 2021-06-10 RX ORDER — FINASTERIDE 5 MG/1
1 TABLET, FILM COATED ORAL
Qty: 0 | Refills: 0 | DISCHARGE

## 2021-06-10 RX ORDER — LISINOPRIL 2.5 MG/1
1 TABLET ORAL
Qty: 0 | Refills: 0 | DISCHARGE

## 2021-06-10 RX ORDER — PANTOPRAZOLE SODIUM 20 MG/1
40 TABLET, DELAYED RELEASE ORAL ONCE
Refills: 0 | Status: DISCONTINUED | OUTPATIENT
Start: 2021-06-10 | End: 2021-06-10

## 2021-06-10 RX ORDER — IPRATROPIUM BROMIDE 0.2 MG/ML
2 SOLUTION, NON-ORAL INHALATION
Qty: 0 | Refills: 0 | DISCHARGE

## 2021-06-10 RX ORDER — LISINOPRIL 2.5 MG/1
40 TABLET ORAL DAILY
Refills: 0 | Status: DISCONTINUED | OUTPATIENT
Start: 2021-06-10 | End: 2021-06-10

## 2021-06-10 RX ORDER — KETOROLAC TROMETHAMINE 0.5 %
1 DROPS OPHTHALMIC (EYE)
Qty: 0 | Refills: 0 | DISCHARGE

## 2021-06-10 RX ORDER — PANTOPRAZOLE SODIUM 20 MG/1
40 TABLET, DELAYED RELEASE ORAL ONCE
Refills: 0 | Status: COMPLETED | OUTPATIENT
Start: 2021-06-10 | End: 2021-06-10

## 2021-06-10 RX ORDER — DOCUSATE SODIUM 100 MG
1 CAPSULE ORAL
Qty: 0 | Refills: 0 | DISCHARGE

## 2021-06-10 RX ORDER — FINASTERIDE 5 MG/1
5 TABLET, FILM COATED ORAL DAILY
Refills: 0 | Status: DISCONTINUED | OUTPATIENT
Start: 2021-06-10 | End: 2021-06-15

## 2021-06-10 RX ORDER — PANTOPRAZOLE SODIUM 20 MG/1
1 TABLET, DELAYED RELEASE ORAL
Qty: 0 | Refills: 0 | DISCHARGE

## 2021-06-10 RX ORDER — POLYETHYLENE GLYCOL 3350 17 G/17G
1 POWDER, FOR SOLUTION ORAL
Qty: 0 | Refills: 0 | DISCHARGE

## 2021-06-10 RX ORDER — LORATADINE 10 MG/1
1 TABLET ORAL
Qty: 0 | Refills: 0 | DISCHARGE

## 2021-06-10 RX ORDER — RANITIDINE HYDROCHLORIDE 150 MG/1
1 TABLET, FILM COATED ORAL
Qty: 0 | Refills: 0 | DISCHARGE

## 2021-06-10 RX ADMIN — SODIUM CHLORIDE 1000 MILLILITER(S): 9 INJECTION INTRAMUSCULAR; INTRAVENOUS; SUBCUTANEOUS at 15:27

## 2021-06-10 RX ADMIN — TAMSULOSIN HYDROCHLORIDE 0.4 MILLIGRAM(S): 0.4 CAPSULE ORAL at 22:21

## 2021-06-10 RX ADMIN — PANTOPRAZOLE SODIUM 40 MILLIGRAM(S): 20 TABLET, DELAYED RELEASE ORAL at 15:26

## 2021-06-10 NOTE — ED PROVIDER NOTE - CLINICAL SUMMARY MEDICAL DECISION MAKING FREE TEXT BOX
77 y/o male with anemia sent in by PMD. GUAIAC negative. Will check labs and disposition accordingly, told by primary to stop blood thinner.

## 2021-06-10 NOTE — H&P ADULT - HISTORY OF PRESENT ILLNESS
77 y/o male with PMHx BPH, HTN presents to ED c/o abnormal lab result. Patient reports on blood thinner, unsure of name but told to stop x yesterday. Patient sent to ED by physician for black stool and anemia. Patient notes hemoglobin 7.3. Patient states episode epigastric cramping x yesterday. Patient denies shortness of breath, nausea and vomiting. No other known complaints. NKDA. 75 y/o male with MHx BPH, Hypertension , hard of hearing, presents to ED c/o abnormal lab result. Patient reports that he woke up in the morning with abdominal pain and bloating. He also reports1 episode of dark BM,  Patient  Patient sent to ED by physician for black stool and anemia. Patient notes hemoglobin 7.3. Patient states episode epigastric cramping x yesterday. Patient denies shortness of breath, nausea and vomiting. No other known complaints. NKDA. 75 y/o male with MHx BPH, Hypertension , hard of hearing, presents to ED with low Hb. Patient reports that he woke up in the morning with upper abdominal pain and bloating. He also reports1 episode of dark BM, and reports getting weak and dizzy while he was climbing up the stairs.   Patient denies shortness of breath, nausea and vomiting, seizures, chest pain, LOC, periods of confusion, diarrhea, urinary compliants       ED course:  Na 122  s/p 1 NS bolus     Vital Signs Last 24 Hrs  T(C): 36.4 (10 Albert 2021 20:24), Max: 36.4 (10 Albert 2021 14:13)  T(F): 97.5 (10 Albert 2021 20:24), Max: 97.6 (10 Albert 2021 14:13)  HR: 68 (10 Albert 2021 20:24) (68 - 85)  BP: 107/53 (10 Albert 2021 20:24) (107/53 - 113/56)  RR: 17 (10 Albert 2021 20:24) (16 - 17)  SpO2: 100% (10 Albert 2021 20:24) (99% - 100%)

## 2021-06-10 NOTE — H&P ADULT - PROBLEM SELECTOR PLAN 1
Patient is coming with Low Hb and also reports weakness, dizziness and neck pain during exertion (1 episode)  Hb 7.3  Also reports dark stools since1 day   Medication list shows he was on Aspirin, which was taken off recently   FOBT negative   Will give 2 PRBC units and repeat CBC Q8 for now   Protonix BID   GI consult Dr Bucio   f/u anemia panel

## 2021-06-10 NOTE — H&P ADULT - PROBLEM SELECTOR PLAN 2
Na 122  Mild to moderate hyponatremia in the setting of medication intake   denies confusion, seizures, chest pain, Headaches,    pt takes HCTZ at home , will hold it off for now   s/p 1 NS bolus in ED   Will repeat BMP Q12 for now   Nephro consult Dr Littlejohn   f/u urine lytes along with serum osmolarity

## 2021-06-10 NOTE — ED ADULT NURSE NOTE - NSIMPLEMENTINTERV_GEN_ALL_ED
Implemented All Universal Safety Interventions:  [place] to call system. Call bell, personal items and telephone within reach. Instruct patient to call for assistance. Room bathroom lighting operational. Non-slip footwear when patient is off stretcher. Physically safe environment: no spills, clutter or unnecessary equipment. Stretcher in lowest position, wheels locked, appropriate side rails in place.

## 2021-06-10 NOTE — H&P ADULT - ATTENDING COMMENTS
77 y/o male with PMHx BPH, HTN presents to ED c/o abnormal lab result. Patient reports on blood thinner, unsure of name but told to stop x yesterday. Patient sent to ED by physician for black stool and anemia. Patient notes hemoglobin 7.3. Patient states episode epigastric cramping x yesterday. Patient denies shortness of breath, nausea and vomiting. No other known complaints. NKDA.      assessment  -- anemia, r/o gi bleed, hypo natremia, h/o BPH, HTN     plan  --  admit to med, protonix, cont preadmit home meds, gi and dvt profilaxis, ivf  cbc, bmp, mg, phos, lipids, tsh, bld cx, ua, ucx, stool occult blood x3, anemia panel, urine lytes    gi cons  renal cons

## 2021-06-10 NOTE — ED ADULT NURSE NOTE - OBJECTIVE STATEMENT
Pt. sent by PCP for low h/h and black stools. Pt. denies any symptoms other than some shortness of breath on exertion.

## 2021-06-10 NOTE — H&P ADULT - NSICDXPASTMEDICALHX_GEN_ALL_CORE_FT
PAST MEDICAL HISTORY:  BPH (benign prostatic hyperplasia)     BPH (benign prostatic hyperplasia)     HTN (hypertension)     HTN (hypertension)

## 2021-06-10 NOTE — H&P ADULT - PROBLEM SELECTOR PLAN 6
RISK                                                          Points  [] Previous VTE                                           3  [] Thrombophilia                                        2  [] Lower limb paralysis                              2   [] Current Cancer                                       2   [x] Immobilization > 24 hrs                        1  [] ICU/CCU stay > 24 hours                       1  [x] Age > 60                                                   1    SCD boots for now

## 2021-06-10 NOTE — ED PROVIDER NOTE - OBJECTIVE STATEMENT
77 y/o male with PMHx BPH, HTN presents to ED c/o abnormal lab result. Patient reports on blood thinner, unsure of name but told to stop x yesterday. Patient sent to ED by physician for black stool and anemia. Patient notes hemoglobin 7.3. Patient states episode epigastric cramping x yesterday. Patient denies shortness of breath, nausea and vomiting. No other known complaints. NKDA.

## 2021-06-11 DIAGNOSIS — Z02.9 ENCOUNTER FOR ADMINISTRATIVE EXAMINATIONS, UNSPECIFIED: ICD-10-CM

## 2021-06-11 LAB
A1C WITH ESTIMATED AVERAGE GLUCOSE RESULT: 6.2 % — HIGH (ref 4–5.6)
ALBUMIN SERPL ELPH-MCNC: 3.3 G/DL — LOW (ref 3.5–5)
ALP SERPL-CCNC: 59 U/L — SIGNIFICANT CHANGE UP (ref 40–120)
ALT FLD-CCNC: 17 U/L DA — SIGNIFICANT CHANGE UP (ref 10–60)
ANION GAP SERPL CALC-SCNC: 6 MMOL/L — SIGNIFICANT CHANGE UP (ref 5–17)
ANION GAP SERPL CALC-SCNC: 7 MMOL/L — SIGNIFICANT CHANGE UP (ref 5–17)
ANION GAP SERPL CALC-SCNC: 9 MMOL/L — SIGNIFICANT CHANGE UP (ref 5–17)
AST SERPL-CCNC: 12 U/L — SIGNIFICANT CHANGE UP (ref 10–40)
BASOPHILS # BLD AUTO: 0 K/UL — SIGNIFICANT CHANGE UP (ref 0–0.2)
BASOPHILS NFR BLD AUTO: 0 % — SIGNIFICANT CHANGE UP (ref 0–2)
BILIRUB DIRECT SERPL-MCNC: 0.4 MG/DL — HIGH (ref 0–0.2)
BILIRUB INDIRECT FLD-MCNC: 1.9 MG/DL — HIGH (ref 0.2–1)
BILIRUB SERPL-MCNC: 2.3 MG/DL — HIGH (ref 0.2–1.2)
BILIRUB SERPL-MCNC: 2.3 MG/DL — HIGH (ref 0.2–1.2)
BUN SERPL-MCNC: 18 MG/DL — SIGNIFICANT CHANGE UP (ref 7–18)
BUN SERPL-MCNC: 20 MG/DL — HIGH (ref 7–18)
BUN SERPL-MCNC: 22 MG/DL — HIGH (ref 7–18)
CALCIUM SERPL-MCNC: 8 MG/DL — LOW (ref 8.4–10.5)
CALCIUM SERPL-MCNC: 8.1 MG/DL — LOW (ref 8.4–10.5)
CALCIUM SERPL-MCNC: 8.6 MG/DL — SIGNIFICANT CHANGE UP (ref 8.4–10.5)
CHLORIDE SERPL-SCNC: 100 MMOL/L — SIGNIFICANT CHANGE UP (ref 96–108)
CHLORIDE SERPL-SCNC: 101 MMOL/L — SIGNIFICANT CHANGE UP (ref 96–108)
CHLORIDE SERPL-SCNC: 98 MMOL/L — SIGNIFICANT CHANGE UP (ref 96–108)
CO2 SERPL-SCNC: 24 MMOL/L — SIGNIFICANT CHANGE UP (ref 22–31)
COVID-19 SPIKE DOMAIN AB INTERP: POSITIVE
COVID-19 SPIKE DOMAIN ANTIBODY RESULT: 199 U/ML — HIGH
CREAT SERPL-MCNC: 0.81 MG/DL — SIGNIFICANT CHANGE UP (ref 0.5–1.3)
CREAT SERPL-MCNC: 0.82 MG/DL — SIGNIFICANT CHANGE UP (ref 0.5–1.3)
CREAT SERPL-MCNC: 0.85 MG/DL — SIGNIFICANT CHANGE UP (ref 0.5–1.3)
EOSINOPHIL # BLD AUTO: 0.01 K/UL — SIGNIFICANT CHANGE UP (ref 0–0.5)
EOSINOPHIL NFR BLD AUTO: 0.2 % — SIGNIFICANT CHANGE UP (ref 0–6)
ESTIMATED AVERAGE GLUCOSE: 131 MG/DL — HIGH (ref 68–114)
FERRITIN SERPL-MCNC: 254 NG/ML — SIGNIFICANT CHANGE UP (ref 30–400)
GLUCOSE BLDC GLUCOMTR-MCNC: 83 MG/DL — SIGNIFICANT CHANGE UP (ref 70–99)
GLUCOSE SERPL-MCNC: 81 MG/DL — SIGNIFICANT CHANGE UP (ref 70–99)
GLUCOSE SERPL-MCNC: 93 MG/DL — SIGNIFICANT CHANGE UP (ref 70–99)
GLUCOSE SERPL-MCNC: 96 MG/DL — SIGNIFICANT CHANGE UP (ref 70–99)
HCT VFR BLD CALC: 26 % — LOW (ref 39–50)
HGB BLD-MCNC: 8.5 G/DL — LOW (ref 13–17)
IMM GRANULOCYTES NFR BLD AUTO: 0.5 % — SIGNIFICANT CHANGE UP (ref 0–1.5)
IRON SATN MFR SERPL: 147 UG/DL — SIGNIFICANT CHANGE UP (ref 65–170)
IRON SATN MFR SERPL: 78 % — HIGH (ref 20–55)
LYMPHOCYTES # BLD AUTO: 3.39 K/UL — HIGH (ref 1–3.3)
LYMPHOCYTES # BLD AUTO: 54.9 % — HIGH (ref 13–44)
MAGNESIUM SERPL-MCNC: 2.2 MG/DL — SIGNIFICANT CHANGE UP (ref 1.6–2.6)
MCHC RBC-ENTMCNC: 26.8 PG — LOW (ref 27–34)
MCHC RBC-ENTMCNC: 32.7 GM/DL — SIGNIFICANT CHANGE UP (ref 32–36)
MCV RBC AUTO: 82 FL — SIGNIFICANT CHANGE UP (ref 80–100)
MONOCYTES # BLD AUTO: 0.21 K/UL — SIGNIFICANT CHANGE UP (ref 0–0.9)
MONOCYTES NFR BLD AUTO: 3.4 % — SIGNIFICANT CHANGE UP (ref 2–14)
NEUTROPHILS # BLD AUTO: 2.54 K/UL — SIGNIFICANT CHANGE UP (ref 1.8–7.4)
NEUTROPHILS NFR BLD AUTO: 41 % — LOW (ref 43–77)
NRBC # BLD: 0 /100 WBCS — SIGNIFICANT CHANGE UP (ref 0–0)
PHOSPHATE SERPL-MCNC: 3.5 MG/DL — SIGNIFICANT CHANGE UP (ref 2.5–4.5)
PLATELET # BLD AUTO: 172 K/UL — SIGNIFICANT CHANGE UP (ref 150–400)
POTASSIUM SERPL-MCNC: 3.7 MMOL/L — SIGNIFICANT CHANGE UP (ref 3.5–5.3)
POTASSIUM SERPL-MCNC: 4 MMOL/L — SIGNIFICANT CHANGE UP (ref 3.5–5.3)
POTASSIUM SERPL-MCNC: 4.1 MMOL/L — SIGNIFICANT CHANGE UP (ref 3.5–5.3)
POTASSIUM SERPL-SCNC: 3.7 MMOL/L — SIGNIFICANT CHANGE UP (ref 3.5–5.3)
POTASSIUM SERPL-SCNC: 4 MMOL/L — SIGNIFICANT CHANGE UP (ref 3.5–5.3)
POTASSIUM SERPL-SCNC: 4.1 MMOL/L — SIGNIFICANT CHANGE UP (ref 3.5–5.3)
PROT SERPL-MCNC: 5.9 G/DL — LOW (ref 6–8.3)
RBC # BLD: 3.17 M/UL — LOW (ref 4.2–5.8)
RBC # FLD: 19.6 % — HIGH (ref 10.3–14.5)
SARS-COV-2 IGG+IGM SERPL QL IA: 199 U/ML — HIGH
SARS-COV-2 IGG+IGM SERPL QL IA: POSITIVE
SODIUM SERPL-SCNC: 129 MMOL/L — LOW (ref 135–145)
SODIUM SERPL-SCNC: 130 MMOL/L — LOW (ref 135–145)
SODIUM SERPL-SCNC: 134 MMOL/L — LOW (ref 135–145)
TIBC SERPL-MCNC: 188 UG/DL — LOW (ref 250–450)
TRANSFERRIN SERPL-MCNC: 150 MG/DL — LOW (ref 200–360)
UIBC SERPL-MCNC: 41 UG/DL — LOW (ref 110–370)
WBC # BLD: 6.18 K/UL — SIGNIFICANT CHANGE UP (ref 3.8–10.5)
WBC # FLD AUTO: 6.18 K/UL — SIGNIFICANT CHANGE UP (ref 3.8–10.5)

## 2021-06-11 RX ORDER — SODIUM CHLORIDE 9 MG/ML
1000 INJECTION, SOLUTION INTRAVENOUS
Refills: 0 | Status: DISCONTINUED | OUTPATIENT
Start: 2021-06-11 | End: 2021-06-11

## 2021-06-11 RX ADMIN — PANTOPRAZOLE SODIUM 40 MILLIGRAM(S): 20 TABLET, DELAYED RELEASE ORAL at 17:15

## 2021-06-11 RX ADMIN — FINASTERIDE 5 MILLIGRAM(S): 5 TABLET, FILM COATED ORAL at 12:04

## 2021-06-11 RX ADMIN — SODIUM CHLORIDE 80 MILLILITER(S): 9 INJECTION, SOLUTION INTRAVENOUS at 13:38

## 2021-06-11 RX ADMIN — PANTOPRAZOLE SODIUM 40 MILLIGRAM(S): 20 TABLET, DELAYED RELEASE ORAL at 05:28

## 2021-06-11 RX ADMIN — Medication 1 DROP(S): at 17:18

## 2021-06-11 RX ADMIN — TAMSULOSIN HYDROCHLORIDE 0.4 MILLIGRAM(S): 0.4 CAPSULE ORAL at 21:33

## 2021-06-11 RX ADMIN — Medication 1 DROP(S): at 05:28

## 2021-06-11 NOTE — PROGRESS NOTE ADULT - PROBLEM SELECTOR PLAN 1
FOBT negative   Hgb 8.5  S/p 1U PRBC  Continue Protonix BID   Start CLD per GI recommendation  Dr Bucio, GI, consulted FOBT negative   Hgb 8.5  S/p 1U PRBC  Continue Protonix BID   Start diet per GI recommendation, will begin prep on 6/13  Dr Bucio, GI, consulted

## 2021-06-11 NOTE — CONSULT NOTE ADULT - NEGATIVE OPHTHALMOLOGIC SYMPTOMS
no diplopia/no photophobia/no lacrimation L/no lacrimation R/no blurred vision L/no blurred vision R/no discharge L/no discharge R/no pain L/no pain R/no irritation L/no irritation R/no loss of vision L/no scleral injection L/no scleral injection R

## 2021-06-11 NOTE — CONSULT NOTE ADULT - NEGATIVE GASTROINTESTINAL SYMPTOMS
no nausea/no vomiting/no flatulence/no abdominal pain/no melena/no hematochezia/no steatorrhea/no jaundice

## 2021-06-11 NOTE — PROGRESS NOTE ADULT - SUBJECTIVE AND OBJECTIVE BOX
Patient is a 76y old  Male who presents with a chief complaint of GI bleed (10 Albert 2021 17:29)    pt seen in icu [  ], reg med floor [   ], bed [  ], chair at bedside [   ], a+o x3 [  ], lethargic [  ],  nad [  ]    elam [  ], ngt [  ], peg [  ], et tube [  ], cent line [  ], picc line [  ]        Allergies    No Known Allergies        Vitals    T(F): 97.7 (06-11-21 @ 05:43), Max: 98.3 (06-11-21 @ 02:40)  HR: 66 (06-11-21 @ 05:43) (60 - 85)  BP: 130/73 (06-11-21 @ 05:43) (99/46 - 130/73)  RR: 16 (06-11-21 @ 05:43) (15 - 17)  SpO2: 99% (06-11-21 @ 05:43) (99% - 100%)  Wt(kg): --  CAPILLARY BLOOD GLUCOSE      POCT Blood Glucose.: 119 mg/dL (10 Albert 2021 23:54)      Labs                          6.6    6.72  )-----------( 181      ( 10 Albert 2021 21:29 )             20.5       06-10    129<L>  |  98  |  23<H>  ----------------------------<  99  3.5   |  24  |  0.92    Ca    8.1<L>      10 Albert 2021 21:30    TPro  6.8  /  Alb  3.8  /  TBili  2.3<H>  /  DBili  x   /  AST  18  /  ALT  22  /  AlkPhos  70  06-10      CARDIAC MARKERS ( 10 Albert 2021 15:33 )  <0.015 ng/mL / x     / x     / x     / x                Radiology Results      Meds    MEDICATIONS  (STANDING):  artificial  tears Solution 1 Drop(s) Both EYES two times a day  finasteride 5 milliGRAM(s) Oral daily  ketorolac 0.5% Ophthalmic Solution 1 Drop(s) Both EYES two times a day  pantoprazole  Injectable 40 milliGRAM(s) IV Push every 12 hours  tamsulosin 0.4 milliGRAM(s) Oral at bedtime      MEDICATIONS  (PRN):      Physical Exam    Neuro :  no focal deficits  Respiratory: CTA B/L  CV: RRR, S1S2, no murmurs,   Abdominal: Soft, NT, ND +BS,  Extremities: No edema, + peripheral pulses    ASSESSMENT    Anemia    HTN (hypertension)    BPH (benign prostatic hyperplasia)    HTN (hypertension)    BPH (benign prostatic hyperplasia)    Abdominal hernia    Bilateral inguinal hernia        PLAN     Patient is a 76y old  Male who presents with a chief complaint of GI bleed (10 Albert 2021 17:29)    pt seen in icu [  ], reg med floor [ x  ], bed [ x ], chair at bedside [   ], a+o x3 [x ], lethargic [  ],  nad [ x ]        Allergies    No Known Allergies        Vitals    T(F): 97.7 (06-11-21 @ 05:43), Max: 98.3 (06-11-21 @ 02:40)  HR: 66 (06-11-21 @ 05:43) (60 - 85)  BP: 130/73 (06-11-21 @ 05:43) (99/46 - 130/73)  RR: 16 (06-11-21 @ 05:43) (15 - 17)  SpO2: 99% (06-11-21 @ 05:43) (99% - 100%)  Wt(kg): --  CAPILLARY BLOOD GLUCOSE      POCT Blood Glucose.: 119 mg/dL (10 Albert 2021 23:54)      Labs                          6.6    6.72  )-----------( 181      ( 10 Albert 2021 21:29 )             20.5       06-10    129<L>  |  98  |  23<H>  ----------------------------<  99  3.5   |  24  |  0.92    Ca    8.1<L>      10 Albert 2021 21:30    TPro  6.8  /  Alb  3.8  /  TBili  2.3<H>  /  DBili  x   /  AST  18  /  ALT  22  /  AlkPhos  70  06-10      CARDIAC MARKERS ( 10 Albert 2021 15:33 )  <0.015 ng/mL / x     / x     / x     / x                Radiology Results      Meds    MEDICATIONS  (STANDING):  artificial  tears Solution 1 Drop(s) Both EYES two times a day  finasteride 5 milliGRAM(s) Oral daily  ketorolac 0.5% Ophthalmic Solution 1 Drop(s) Both EYES two times a day  pantoprazole  Injectable 40 milliGRAM(s) IV Push every 12 hours  tamsulosin 0.4 milliGRAM(s) Oral at bedtime      MEDICATIONS  (PRN):      Physical Exam    Neuro :  no focal deficits  Respiratory: CTA B/L  CV: RRR, S1S2, no murmurs,   Abdominal: Soft, NT, ND +BS,  Extremities: No edema, + peripheral pulses        ASSESSMENT    Anemia   r/o gi bleed,   hyponatremia,   h/o HTN (hypertension)  BPH (benign prostatic hyperplasia)  Abdominal hernia  Bilateral inguinal hernia        PLAN    s/p transfusion 2 prbc   cont protonix,   gi cons  stool occult blood x3,   f/u anemia panel,   urine lytes  renal cons.   hydralazine held 2nd to hyponatremia   serum sodium improving   hold ivf  cont current meds

## 2021-06-11 NOTE — CONSULT NOTE ADULT - NEGATIVE MUSCULOSKELETAL SYMPTOMS
no arthralgia/no arthritis/no joint swelling/no myalgia/no stiffness/no neck pain/no arm pain L/no arm pain R/no back pain/no leg pain L/no leg pain R

## 2021-06-11 NOTE — PROGRESS NOTE ADULT - SUBJECTIVE AND OBJECTIVE BOX
HPI:  76 YOM admitted with anemia likely secondary to GIB.  Pt transfused 1U PRBC, GI consulted.    OVERNIGHT EVENTS:  Transfused 1U PRBC    REVIEW OF SYSTEMS:      CONSTITUTIONAL: No fever  EYES: no acute visual disturbances  NECK: No pain or stiffness  RESPIRATORY: No cough; No shortness of breath  CARDIOVASCULAR: No chest pain, no palpitations  GASTROINTESTINAL: No pain. No nausea, vomiting or diarrhea   NEUROLOGICAL: No headache or numbness, no tremors  MUSCULOSKELETAL: No joint pain, no muscle pain  GENITOURINARY: no dysuria, no frequency, no hesitancy  PSYCHIATRY: no depression, no anxiety  ALL OTHER  ROS negative        Vital Signs Last 24 Hrs  T(C): 37 (11 Jun 2021 13:48), Max: 37 (11 Jun 2021 13:48)  T(F): 98.6 (11 Jun 2021 13:48), Max: 98.6 (11 Jun 2021 13:48)  HR: 65 (11 Jun 2021 13:48) (60 - 73)  BP: 148/75 (11 Jun 2021 13:48) (99/46 - 148/75)  BP(mean): --  RR: 20 (11 Jun 2021 13:48) (15 - 20)  SpO2: 100% (11 Jun 2021 13:48) (99% - 100%)    ________________________________________________  PHYSICAL EXAM:    GENERAL: NAD  HEENT: Normocephalic; conjunctivae and sclerae clear;  NECK : supple, no JVD  CHEST/LUNG: Clear to auscultation  HEART: S1 S2  regular  ABDOMEN: Soft, Nontender, Nondistended; Bowel sounds present  EXTREMITIES: no cyanosis; no LE edema; no calf tenderness  SKIN: warm and dry  NERVOUS SYSTEM:  Alert; no new deficits    _________________________________________________  CURRENT MEDICATIONS:    MEDICATIONS  (STANDING):  artificial  tears Solution 1 Drop(s) Both EYES two times a day  dextrose 5%. 1000 milliLiter(s) (80 mL/Hr) IV Continuous <Continuous>  finasteride 5 milliGRAM(s) Oral daily  ketorolac 0.5% Ophthalmic Solution 1 Drop(s) Both EYES two times a day  pantoprazole  Injectable 40 milliGRAM(s) IV Push every 12 hours  tamsulosin 0.4 milliGRAM(s) Oral at bedtime    MEDICATIONS  (PRN):      __________________________________________________  LABS:                          8.5    6.18  )-----------( 172      ( 11 Jun 2021 07:15 )             26.0     06-11    129<L>  |  98  |  20<H>  ----------------------------<  96  4.0   |  24  |  0.85    Ca    8.1<L>      11 Jun 2021 13:44  Phos  3.5     06-11  Mg     2.2     06-11    TPro  5.9<L>  /  Alb  3.3<L>  /  TBili  2.3<H>  /  DBili  0.4<H>  /  AST  12  /  ALT  17  /  AlkPhos  59  06-11    PT/INR - ( 10 Albert 2021 15:33 )   PT: 12.3 sec;   INR: 1.04 ratio         PTT - ( 10 Albert 2021 15:33 )  PTT:24.1 sec    CAPILLARY BLOOD GLUCOSE      POCT Blood Glucose.: 83 mg/dL (11 Jun 2021 16:33)  POCT Blood Glucose.: 119 mg/dL (10 Albert 2021 23:54)      __________________________________________________  RADIOLOGY & ADDITIONAL TESTS:    Imaging Personally Reviewed:  YES    < from: CT Angio Abdomen and Pelvis w/ IV Cont (06.10.21 @ 17:32) >  FINDINGS:  LOWER CHEST: Within normal limits.    LIVER: Subcentimeter lesion in the right hepatic lobe, too small to characterize.  BILE DUCTS: Normal caliber.  GALLBLADDER: Cholelithiasis.  SPLEEN: Within normal limits.  PANCREAS: Within normal limits.  ADRENALS: Within normal limits.  KIDNEYS/URETERS: Moderate left hydronephrosis, unchanged. Bilateral renal cortical scarring.    BLADDER: Within normal limits.  REPRODUCTIVE ORGANS: Enlarged prostate.    BOWEL: No bowel obstruction. Appendix is not visualized. Diverticulosis, without acute diverticulitis. No evidence of active GI bleed.  PERITONEUM: No ascites.  VESSELS: Atherosclerotic calcifications.  RETROPERITONEUM/LYMPH NODES: Stable confluent soft tissue density in the retroperitoneum extending along the course of the aorta and iliac vessels, predominantly on the left. Stable prominent right sided retroperitoneal lymph node measuring 1.1 cm (19:42)  ABDOMINAL WALL: Left inguinal postoperative changes.  BONES: Within normal limits.    IMPRESSION:  No evidence of active GI bleed.  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 Other etiologies for retroperitoneal mass such as lymphoma are not excluded.    < end of copied text >    Consultant(s) Notes Reviewed:   YES     Plan of care was discussed with patient and /or primary care giver; all questions and concerns were addressed and care was aligned with patient's wishes.    Plan discussed with attending and consulting physicians.

## 2021-06-12 LAB
APPEARANCE UR: CLEAR — SIGNIFICANT CHANGE UP
BACTERIA # UR AUTO: ABNORMAL /HPF
BILIRUB UR-MCNC: NEGATIVE — SIGNIFICANT CHANGE UP
COLOR SPEC: YELLOW — SIGNIFICANT CHANGE UP
CREAT ?TM UR-MCNC: 58 MG/DL — SIGNIFICANT CHANGE UP
DIFF PNL FLD: ABNORMAL
GLUCOSE UR QL: NEGATIVE — SIGNIFICANT CHANGE UP
HCT VFR BLD CALC: 28.7 % — LOW (ref 39–50)
HGB BLD-MCNC: 9.4 G/DL — LOW (ref 13–17)
KETONES UR-MCNC: NEGATIVE — SIGNIFICANT CHANGE UP
LEUKOCYTE ESTERASE UR-ACNC: ABNORMAL
MAGNESIUM SERPL-MCNC: 2.2 MG/DL — SIGNIFICANT CHANGE UP (ref 1.6–2.6)
MCHC RBC-ENTMCNC: 26.9 PG — LOW (ref 27–34)
MCHC RBC-ENTMCNC: 32.8 GM/DL — SIGNIFICANT CHANGE UP (ref 32–36)
MCV RBC AUTO: 82.2 FL — SIGNIFICANT CHANGE UP (ref 80–100)
NITRITE UR-MCNC: NEGATIVE — SIGNIFICANT CHANGE UP
NRBC # BLD: 0 /100 WBCS — SIGNIFICANT CHANGE UP (ref 0–0)
OSMOLALITY UR: 465 MOS/KG — SIGNIFICANT CHANGE UP (ref 50–1200)
PH UR: 7 — SIGNIFICANT CHANGE UP (ref 5–8)
PHOSPHATE SERPL-MCNC: 3.8 MG/DL — SIGNIFICANT CHANGE UP (ref 2.5–4.5)
PLATELET # BLD AUTO: 180 K/UL — SIGNIFICANT CHANGE UP (ref 150–400)
POTASSIUM UR-SCNC: 56 MMOL/L — SIGNIFICANT CHANGE UP
PROT UR-MCNC: NEGATIVE — SIGNIFICANT CHANGE UP
RBC # BLD: 3.49 M/UL — LOW (ref 4.2–5.8)
RBC # FLD: 19.6 % — HIGH (ref 10.3–14.5)
RBC CASTS # UR COMP ASSIST: SIGNIFICANT CHANGE UP /HPF (ref 0–2)
SODIUM UR-SCNC: 75 MMOL/L — SIGNIFICANT CHANGE UP
SP GR SPEC: 1 — LOW (ref 1.01–1.02)
UROBILINOGEN FLD QL: 1
WBC # BLD: 5.59 K/UL — SIGNIFICANT CHANGE UP (ref 3.8–10.5)
WBC # FLD AUTO: 5.59 K/UL — SIGNIFICANT CHANGE UP (ref 3.8–10.5)
WBC UR QL: SIGNIFICANT CHANGE UP /HPF (ref 0–5)

## 2021-06-12 RX ADMIN — Medication 1 DROP(S): at 17:22

## 2021-06-12 RX ADMIN — PANTOPRAZOLE SODIUM 40 MILLIGRAM(S): 20 TABLET, DELAYED RELEASE ORAL at 05:26

## 2021-06-12 RX ADMIN — PANTOPRAZOLE SODIUM 40 MILLIGRAM(S): 20 TABLET, DELAYED RELEASE ORAL at 17:17

## 2021-06-12 RX ADMIN — FINASTERIDE 5 MILLIGRAM(S): 5 TABLET, FILM COATED ORAL at 11:41

## 2021-06-12 RX ADMIN — TAMSULOSIN HYDROCHLORIDE 0.4 MILLIGRAM(S): 0.4 CAPSULE ORAL at 21:52

## 2021-06-12 RX ADMIN — Medication 1 DROP(S): at 05:26

## 2021-06-12 RX ADMIN — Medication 1 DROP(S): at 05:27

## 2021-06-12 NOTE — PROGRESS NOTE ADULT - SUBJECTIVE AND OBJECTIVE BOX
[   ] ICU                                          [   ] CCU                                      [ X  ] Medical Floor      Patient is a 76 year old male with GI bleeding . GI consulted to evaluate.         76 year old male with past medical history significant for BPH, Hypertension , hard of hearing, presents to ED with low Hb. Patient reports that he woke up in the morning with upper abdominal pain and bloating. He also reports1 episode of dark BM, and reports getting weak and dizzy while he was climbing up the stairs.   Patient denies hematemesis, hematochezia, fever, chills, chest pain, SOB, cough, hematuria, epistaxis, hemoptysis, dysuria or diarrhea.         Today patient appears comfortable. No new complaints reported, No abdominal pain, N/V, hematemesis, hematochezia, melena, fever, chills, chest pain, SOB, cough or diarrhea reported.       PAIN MANAGEMENT:  Pain Scale:                 0/10  Pain Location:      Prior Colonoscopy:  No prior colonoscopy    PAST MEDICAL HISTORY  HTN   BPH         PAST SURGICAL HISTORY   Bilateral inguinal hernia repair        Allergies    No Known Allergies    Intolerances  None       MEDICATIONS  (STANDING):  artificial  tears Solution 1 Drop(s) Both EYES two times a day  dextrose 5%. 1000 milliLiter(s) (80 mL/Hr) IV Continuous <Continuous>  finasteride 5 milliGRAM(s) Oral daily  ketorolac 0.5% Ophthalmic Solution 1 Drop(s) Both EYES two times a day  pantoprazole  Injectable 40 milliGRAM(s) IV Push every 12 hours  tamsulosin 0.4 milliGRAM(s) Oral at bedtime         SOCIAL HISTORY  Advanced Directives:       [ X ] Full Code       [  ] DNR  Marital Status:         [  ] M      [ X ] S      [  ] D       [  ] W  Children:       [ X ] Yes      [  ] No  Occupation:        [  ] Employed       [ X ] Unemployed       [  ] Retired  Diet:       [ X ] Regular       [  ] PEG feeding          [  ] NG tube feeding  Drug Use:           [ X ] Patient denied          [  ] Yes  Alcohol:           [ X ] No             [  ] Yes (socially)         [  ] Yes (chronic)  Tobacco:           [  ] Yes           [ X ] No      FAMILY HISTORY  [ X ] Heart Disease            [ X ] Diabetes             [ X ] HTN             [  ] Colon Cancer             [  ] Stomach Cancer              [  ] Pancreatic Cancer        VITALS  Vital Signs Last 24 Hrs  T(C): 36.8 (12 Jun 2021 05:11), Max: 37 (11 Jun 2021 13:48)  T(F): 98.2 (12 Jun 2021 05:11), Max: 98.6 (11 Jun 2021 13:48)  HR: 64 (12 Jun 2021 05:11) (62 - 65)  BP: 135/73 (12 Jun 2021 05:11) (119/62 - 148/75)   RR: 18 (12 Jun 2021 05:11) (18 - 20)  SpO2: 100% (12 Jun 2021 05:11) (99% - 100%)       MEDICATIONS  (STANDING):  artificial  tears Solution 1 Drop(s) Both EYES two times a day  finasteride 5 milliGRAM(s) Oral daily  ketorolac 0.5% Ophthalmic Solution 1 Drop(s) Both EYES two times a day  pantoprazole  Injectable 40 milliGRAM(s) IV Push every 12 hours  tamsulosin 0.4 milliGRAM(s) Oral at bedtime    MEDICATIONS  (PRN):                            9.4    5.59  )-----------( 180      ( 12 Jun 2021 06:55 )             28.7       06-11    130<L>  |  100  |  18  ----------------------------<  93  4.1   |  24  |  0.81    Ca    8.0<L>      11 Jun 2021 21:16  Phos  3.8     06-12  Mg     2.2     06-12    TPro  5.9<L>  /  Alb  3.3<L>  /  TBili  2.3<H>  /  DBili  0.4<H>  /  AST  12  /  ALT  17  /  AlkPhos  59  06-11      PT/INR - ( 10 Albert 2021 15:33 )   PT: 12.3 sec;   INR: 1.04 ratio         PTT - ( 10 Albert 2021 15:33 )  PTT:24.1 sec

## 2021-06-12 NOTE — PROGRESS NOTE ADULT - SUBJECTIVE AND OBJECTIVE BOX
Patient is a 76y old  Male who presents with a chief complaint of GI bleed (11 Jun 2021 17:24)    pt seen in icu [  ], reg med floor [ x  ], bed [ x ], chair at bedside [   ], a+o x3 [x ], lethargic [  ],    nad [ x ]      Allergies    No Known Allergies        Vitals    T(F): 98.2 (06-12-21 @ 05:11), Max: 98.6 (06-11-21 @ 13:48)  HR: 64 (06-12-21 @ 05:11) (62 - 65)  BP: 135/73 (06-12-21 @ 05:11) (119/62 - 148/75)  RR: 18 (06-12-21 @ 05:11) (18 - 20)  SpO2: 100% (06-12-21 @ 05:11) (99% - 100%)  Wt(kg): --  CAPILLARY BLOOD GLUCOSE      POCT Blood Glucose.: 83 mg/dL (11 Jun 2021 16:33)      Labs                          8.5    6.18  )-----------( 172      ( 11 Jun 2021 07:15 )             26.0       06-11    130<L>  |  100  |  18  ----------------------------<  93  4.1   |  24  |  0.81    Ca    8.0<L>      11 Jun 2021 21:16  Phos  3.5     06-11  Mg     2.2     06-11    TPro  5.9<L>  /  Alb  3.3<L>  /  TBili  2.3<H>  /  DBili  0.4<H>  /  AST  12  /  ALT  17  /  AlkPhos  59  06-11      CARDIAC MARKERS ( 10 Albert 2021 15:33 )  <0.015 ng/mL / x     / x     / x     / x                Radiology Results      Meds    MEDICATIONS  (STANDING):  artificial  tears Solution 1 Drop(s) Both EYES two times a day  finasteride 5 milliGRAM(s) Oral daily  ketorolac 0.5% Ophthalmic Solution 1 Drop(s) Both EYES two times a day  pantoprazole  Injectable 40 milliGRAM(s) IV Push every 12 hours  tamsulosin 0.4 milliGRAM(s) Oral at bedtime      MEDICATIONS  (PRN):      Physical Exam    Neuro :  no focal deficits  Respiratory: CTA B/L  CV: RRR, S1S2, no murmurs,   Abdominal: Soft, NT, ND +BS,  Extremities: No edema, + peripheral pulses        ASSESSMENT    Anemia   r/o gi bleed,   hyponatremia,   h/o HTN (hypertension)  BPH (benign prostatic hyperplasia)  Abdominal hernia  Bilateral inguinal hernia        PLAN    s/p transfusion 2 prbc   cont protonix,   gi cons  stool occult blood x3,   f/u anemia panel,   urine lytes  renal cons.   hydralazine held 2nd to hyponatremia   serum sodium improving   hold ivf  cont current meds       Patient is a 76y old  Male who presents with a chief complaint of GI bleed (11 Jun 2021 17:24)    pt seen in icu [  ], reg med floor [ x  ], bed [ x ], chair at bedside [   ], a+o x3 [x ], lethargic [  ],    nad [ x ]      Allergies    No Known Allergies        Vitals    T(F): 98.2 (06-12-21 @ 05:11), Max: 98.6 (06-11-21 @ 13:48)  HR: 64 (06-12-21 @ 05:11) (62 - 65)  BP: 135/73 (06-12-21 @ 05:11) (119/62 - 148/75)  RR: 18 (06-12-21 @ 05:11) (18 - 20)  SpO2: 100% (06-12-21 @ 05:11) (99% - 100%)  Wt(kg): --  CAPILLARY BLOOD GLUCOSE      POCT Blood Glucose.: 83 mg/dL (11 Jun 2021 16:33)      Labs                          8.5    6.18  )-----------( 172      ( 11 Jun 2021 07:15 )             26.0       06-11    130<L>  |  100  |  18  ----------------------------<  93  4.1   |  24  |  0.81    Ca    8.0<L>      11 Jun 2021 21:16  Phos  3.5     06-11  Mg     2.2     06-11    TPro  5.9<L>  /  Alb  3.3<L>  /  TBili  2.3<H>  /  DBili  0.4<H>  /  AST  12  /  ALT  17  /  AlkPhos  59  06-11      CARDIAC MARKERS ( 10 Albert 2021 15:33 )  <0.015 ng/mL / x     / x     / x     / x        Iron with Total Binding Capacity in AM (06.11.21 @ 07:15)   Iron - Total Binding Capacity.: 188 ug/dL   % Saturation, Iron: 78 %   Iron Total, Serum: 147 ug/dL   Unsaturated Iron Binding Capacity: 41 ug/dL         Radiology Results      Meds    MEDICATIONS  (STANDING):  artificial  tears Solution 1 Drop(s) Both EYES two times a day  finasteride 5 milliGRAM(s) Oral daily  ketorolac 0.5% Ophthalmic Solution 1 Drop(s) Both EYES two times a day  pantoprazole  Injectable 40 milliGRAM(s) IV Push every 12 hours  tamsulosin 0.4 milliGRAM(s) Oral at bedtime      MEDICATIONS  (PRN):      Physical Exam    Neuro :  no focal deficits  Respiratory: CTA B/L  CV: RRR, S1S2, no murmurs,   Abdominal: Soft, NT, ND +BS,  Extremities: No edema, + peripheral pulses        ASSESSMENT    Anemia   r/o gi bleed,   hyponatremia,   h/o HTN (hypertension)  BPH (benign prostatic hyperplasia)  Abdominal hernia  Bilateral inguinal hernia        PLAN    s/p transfusion 2 prbc   cont protonix,   gi f/u   f/u egd, colonoscopy  stool occult blood x3,   f/u anemia panel,   Hyponatremia , due to HCTZ and possible increased water intake  renal f/u  Sodium increased from 122-139 in 24 hours  No changes in MS  sarted IV fluids, hypotonic D5W and follow sodium q 8 hours  hydralazine held 2nd to hyponatremia   serum sodium improving   cont current meds

## 2021-06-12 NOTE — PROGRESS NOTE ADULT - SUBJECTIVE AND OBJECTIVE BOX
Problem List:  Hyponatremia , was on HCTZ and Lisinopril at home .    GI bleed    PAST MEDICAL & SURGICAL HISTORY:  HTN (hypertension)    BPH (benign prostatic hyperplasia)    HTN (hypertension)    BPH (benign prostatic hyperplasia)    Abdominal hernia    Bilateral inguinal hernia        No Known Allergies      MEDICATIONS  (STANDING):  artificial  tears Solution 1 Drop(s) Both EYES two times a day  finasteride 5 milliGRAM(s) Oral daily  ketorolac 0.5% Ophthalmic Solution 1 Drop(s) Both EYES two times a day  pantoprazole  Injectable 40 milliGRAM(s) IV Push every 12 hours  tamsulosin 0.4 milliGRAM(s) Oral at bedtime    MEDICATIONS  (PRN):                            9.4    5.59  )-----------( 180      ( 12 Jun 2021 06:55 )             28.7     06-11    130<L>  |  100  |  18  ----------------------------<  93  4.1   |  24  |  0.81    Ca    8.0<L>      11 Jun 2021 21:16  Phos  3.8     06-12  Mg     2.2     06-12    TPro  5.9<L>  /  Alb  3.3<L>  /  TBili  2.3<H>  /  DBili  0.4<H>  /  AST  12  /  ALT  17  /  AlkPhos  59  06-11    PT/INR - ( 10 Albert 2021 15:33 )   PT: 12.3 sec;   INR: 1.04 ratio         PTT - ( 10 Albert 2021 15:33 )  PTT:24.1 sec    Sodium, Random Urine: 70 mmol/L (06-10 @ 22:25)  Creatinine, Random Urine: 18 mg/dL (06-10 @ 22:25)      REVIEW OF SYSTEMS:  General: no fever no chills, no weight loss.  EYES/ENT: No visual changes;  No vertigo, no headache.    RESPIRATORY: No cough, wheezing, hemoptysis; No shortness of breath  CARDIOVASCULAR: No chest pain or palpitations. No Edema  GASTROINTESTINAL: No abdominal or epigastric pain. No nausea. Acid feeling in stomach area upper and lower abdomen. Normal color feces and normal  BM no fresh blood  GENITOURINARY: No dysuria, frequency, foamy urine, urinary urgency, incontinence or hematur        VITALS:  T(F): 98.2 (06-12-21 @ 05:11), Max: 98.6 (06-11-21 @ 13:48)  HR: 64 (06-12-21 @ 05:11)  BP: 135/73 (06-12-21 @ 05:11)  RR: 18 (06-12-21 @ 05:11)  SpO2: 100% (06-12-21 @ 05:11)  Wt(kg): --      PHYSICAL EXAM:  Constitutional: well developed, no diaphoresis, no distress.  Neck: No JVD, no carotid bruit, supple.  Respiratory:  air entrance B/L, no wheezes, rales or rhonchi  Cardiovascular: S1, S2, RRR, no pericardial rub, no murmur  Abdomen: BS+, soft, no tenderness, no bruit  Pelvis: bladder nondistended  Extremities: No edema

## 2021-06-13 LAB
ANION GAP SERPL CALC-SCNC: 10 MMOL/L — SIGNIFICANT CHANGE UP (ref 5–17)
BUN SERPL-MCNC: 18 MG/DL — SIGNIFICANT CHANGE UP (ref 7–18)
CALCIUM SERPL-MCNC: 8.4 MG/DL — SIGNIFICANT CHANGE UP (ref 8.4–10.5)
CHLORIDE SERPL-SCNC: 102 MMOL/L — SIGNIFICANT CHANGE UP (ref 96–108)
CO2 SERPL-SCNC: 22 MMOL/L — SIGNIFICANT CHANGE UP (ref 22–31)
CREAT SERPL-MCNC: 0.85 MG/DL — SIGNIFICANT CHANGE UP (ref 0.5–1.3)
GLUCOSE SERPL-MCNC: 89 MG/DL — SIGNIFICANT CHANGE UP (ref 70–99)
HCT VFR BLD CALC: 29.2 % — LOW (ref 39–50)
HGB BLD-MCNC: 9.7 G/DL — LOW (ref 13–17)
MAGNESIUM SERPL-MCNC: 2.1 MG/DL — SIGNIFICANT CHANGE UP (ref 1.6–2.6)
MCHC RBC-ENTMCNC: 27.2 PG — SIGNIFICANT CHANGE UP (ref 27–34)
MCHC RBC-ENTMCNC: 33.2 GM/DL — SIGNIFICANT CHANGE UP (ref 32–36)
MCV RBC AUTO: 81.8 FL — SIGNIFICANT CHANGE UP (ref 80–100)
NRBC # BLD: 0 /100 WBCS — SIGNIFICANT CHANGE UP (ref 0–0)
PHOSPHATE SERPL-MCNC: 3.5 MG/DL — SIGNIFICANT CHANGE UP (ref 2.5–4.5)
PLATELET # BLD AUTO: 180 K/UL — SIGNIFICANT CHANGE UP (ref 150–400)
POTASSIUM SERPL-MCNC: 4.2 MMOL/L — SIGNIFICANT CHANGE UP (ref 3.5–5.3)
POTASSIUM SERPL-SCNC: 4.2 MMOL/L — SIGNIFICANT CHANGE UP (ref 3.5–5.3)
RBC # BLD: 3.57 M/UL — LOW (ref 4.2–5.8)
RBC # FLD: 19.6 % — HIGH (ref 10.3–14.5)
SARS-COV-2 RNA SPEC QL NAA+PROBE: SIGNIFICANT CHANGE UP
SODIUM SERPL-SCNC: 134 MMOL/L — LOW (ref 135–145)
WBC # BLD: 5.13 K/UL — SIGNIFICANT CHANGE UP (ref 3.8–10.5)
WBC # FLD AUTO: 5.13 K/UL — SIGNIFICANT CHANGE UP (ref 3.8–10.5)

## 2021-06-13 RX ORDER — SOD SULF/SODIUM/NAHCO3/KCL/PEG
4000 SOLUTION, RECONSTITUTED, ORAL ORAL ONCE
Refills: 0 | Status: COMPLETED | OUTPATIENT
Start: 2021-06-13 | End: 2021-06-13

## 2021-06-13 RX ADMIN — FINASTERIDE 5 MILLIGRAM(S): 5 TABLET, FILM COATED ORAL at 12:24

## 2021-06-13 RX ADMIN — TAMSULOSIN HYDROCHLORIDE 0.4 MILLIGRAM(S): 0.4 CAPSULE ORAL at 21:37

## 2021-06-13 RX ADMIN — PANTOPRAZOLE SODIUM 40 MILLIGRAM(S): 20 TABLET, DELAYED RELEASE ORAL at 05:43

## 2021-06-13 RX ADMIN — Medication 1 DROP(S): at 05:43

## 2021-06-13 RX ADMIN — Medication 4000 MILLILITER(S): at 19:39

## 2021-06-13 RX ADMIN — Medication 1 DROP(S): at 18:11

## 2021-06-13 RX ADMIN — PANTOPRAZOLE SODIUM 40 MILLIGRAM(S): 20 TABLET, DELAYED RELEASE ORAL at 18:11

## 2021-06-13 NOTE — PROGRESS NOTE ADULT - SUBJECTIVE AND OBJECTIVE BOX
Patient is a 76y old  Male who presents with a chief complaint of GI bleed (12 Jun 2021 13:36)    pt seen in icu [  ], reg med floor [ x  ], bed [ x ], chair at bedside [   ], a+o x3 [x ], lethargic [  ],    nad [ x ]      Allergies    No Known Allergies        Vitals    T(F): 98.2 (06-13-21 @ 05:38), Max: 98.5 (06-12-21 @ 14:30)  HR: 63 (06-13-21 @ 05:38) (63 - 65)  BP: 148/72 (06-13-21 @ 05:38) (119/64 - 148/72)  RR: 16 (06-13-21 @ 05:38) (16 - 18)  SpO2: 100% (06-13-21 @ 05:38) (98% - 100%)  Wt(kg): --  CAPILLARY BLOOD GLUCOSE      POCT Blood Glucose.: 83 mg/dL (11 Jun 2021 16:33)      Labs                          9.7    5.13  )-----------( 180      ( 13 Jun 2021 05:46 )             29.2       06-11    130<L>  |  100  |  18  ----------------------------<  93  4.1   |  24  |  0.81    Ca    8.0<L>      11 Jun 2021 21:16  Phos  3.8     06-12  Mg     2.1     06-13    TPro  5.9<L>  /  Alb  3.3<L>  /  TBili  2.3<H>  /  DBili  0.4<H>  /  AST  12  /  ALT  17  /  AlkPhos  59  06-11                Radiology Results      Meds    MEDICATIONS  (STANDING):  artificial  tears Solution 1 Drop(s) Both EYES two times a day  finasteride 5 milliGRAM(s) Oral daily  ketorolac 0.5% Ophthalmic Solution 1 Drop(s) Both EYES two times a day  pantoprazole  Injectable 40 milliGRAM(s) IV Push every 12 hours  tamsulosin 0.4 milliGRAM(s) Oral at bedtime      MEDICATIONS  (PRN):      Physical Exam    Neuro :  no focal deficits  Respiratory: CTA B/L  CV: RRR, S1S2, no murmurs,   Abdominal: Soft, NT, ND +BS,  Extremities: No edema, + peripheral pulses        ASSESSMENT    Anemia   r/o gi bleed,   hyponatremia,   h/o HTN (hypertension)  BPH (benign prostatic hyperplasia)  Abdominal hernia  Bilateral inguinal hernia        PLAN    s/p transfusion 2 prbc   cont protonix,   gi f/u   f/u egd, colonoscopy  stool occult blood x3,   f/u anemia panel,   Hyponatremia , due to HCTZ and possible increased water intake  renal f/u  Sodium increased from 122-139 in 24 hours  No changes in MS  sarted IV fluids, hypotonic D5W and follow sodium q 8 hours  hydralazine held 2nd to hyponatremia   serum sodium improving   cont current meds       Patient is a 76y old  Male who presents with a chief complaint of GI bleed (12 Jun 2021 13:36)    pt seen in icu [  ], reg med floor [ x  ], bed [ x ], chair at bedside [   ], a+o x3 [x ], lethargic [  ],    nad [ x ]      Allergies    No Known Allergies        Vitals    T(F): 98.2 (06-13-21 @ 05:38), Max: 98.5 (06-12-21 @ 14:30)  HR: 63 (06-13-21 @ 05:38) (63 - 65)  BP: 148/72 (06-13-21 @ 05:38) (119/64 - 148/72)  RR: 16 (06-13-21 @ 05:38) (16 - 18)  SpO2: 100% (06-13-21 @ 05:38) (98% - 100%)  Wt(kg): --  CAPILLARY BLOOD GLUCOSE      POCT Blood Glucose.: 83 mg/dL (11 Jun 2021 16:33)      Labs                          9.7    5.13  )-----------( 180      ( 13 Jun 2021 05:46 )             29.2       06-11    130<L>  |  100  |  18  ----------------------------<  93  4.1   |  24  |  0.81    Ca    8.0<L>      11 Jun 2021 21:16  Phos  3.8     06-12  Mg     2.1     06-13    TPro  5.9<L>  /  Alb  3.3<L>  /  TBili  2.3<H>  /  DBili  0.4<H>  /  AST  12  /  ALT  17  /  AlkPhos  59  06-11                Radiology Results      Meds    MEDICATIONS  (STANDING):  artificial  tears Solution 1 Drop(s) Both EYES two times a day  finasteride 5 milliGRAM(s) Oral daily  ketorolac 0.5% Ophthalmic Solution 1 Drop(s) Both EYES two times a day  pantoprazole  Injectable 40 milliGRAM(s) IV Push every 12 hours  tamsulosin 0.4 milliGRAM(s) Oral at bedtime      MEDICATIONS  (PRN):      Physical Exam    Neuro :  no focal deficits  Respiratory: CTA B/L  CV: RRR, S1S2, no murmurs,   Abdominal: Soft, NT, ND +BS,  Extremities: No edema, + peripheral pulses        ASSESSMENT    Anemia   r/o gi bleed,   hyponatremia,   h/o HTN (hypertension)  BPH (benign prostatic hyperplasia)  Abdominal hernia  Bilateral inguinal hernia        PLAN    s/p transfusion 2 prbc   cont protonix,   gi f/u   f/u egd, colonoscopy  stool occult blood x3,   Hyponatremia , due to HCTZ and possible increased water intake  renal f/u  Sodium increased from 122-139 in 24 hours  No changes in MS  sarted IV fluids, hypotonic D5W and follow sodium q 8 hours  hydralazine held 2nd to hyponatremia   serum sodium improving   cont current meds

## 2021-06-13 NOTE — PROGRESS NOTE ADULT - SUBJECTIVE AND OBJECTIVE BOX
[   ] ICU                                          [   ] CCU                                      [  x ] Medical Floor      Patient is a 76 year old male with GI bleeding . GI consulted to evaluate.         76 year old male with past medical history significant for BPH, Hypertension , hard of hearing, presents to ED with low Hb. Patient reports that he woke up in the morning with upper abdominal pain and bloating. He also reports1 episode of dark BM, and reports getting weak and dizzy while he was climbing up the stairs.   Patient denies hematemesis, hematochezia, fever, chills, chest pain, SOB, cough, hematuria, epistaxis, hemoptysis, dysuria or diarrhea.         Today patient appears comfortable. No new complaints reported, No abdominal pain, N/V, hematemesis, hematochezia, melena, fever, chills, chest pain, SOB, cough or diarrhea reported.       PAIN MANAGEMENT:  Pain Scale:                 0/10  Pain Location:      Prior Colonoscopy:  No prior colonoscopy    PAST MEDICAL HISTORY  HTN   BPH         PAST SURGICAL HISTORY   Bilateral inguinal hernia repair        Allergies    No Known Allergies    Intolerances  None       MEDICATIONS  (STANDING):  artificial  tears Solution 1 Drop(s) Both EYES two times a day  dextrose 5%. 1000 milliLiter(s) (80 mL/Hr) IV Continuous <Continuous>  finasteride 5 milliGRAM(s) Oral daily  ketorolac 0.5% Ophthalmic Solution 1 Drop(s) Both EYES two times a day  pantoprazole  Injectable 40 milliGRAM(s) IV Push every 12 hours  tamsulosin 0.4 milliGRAM(s) Oral at bedtime         SOCIAL HISTORY  Advanced Directives:       [ X ] Full Code       [  ] DNR  Marital Status:         [  ] M      [ X ] S      [  ] D       [  ] W  Children:       [ X ] Yes      [  ] No  Occupation:        [  ] Employed       [ X ] Unemployed       [  ] Retired  Diet:       [ X ] Regular       [  ] PEG feeding          [  ] NG tube feeding  Drug Use:           [ X ] Patient denied          [  ] Yes  Alcohol:           [ X ] No             [  ] Yes (socially)         [  ] Yes (chronic)  Tobacco:           [  ] Yes           [ X ] No      FAMILY HISTORY  [ X ] Heart Disease            [ X ] Diabetes             [ X ] HTN             [  ] Colon Cancer             [  ] Stomach Cancer              [  ] Pancreatic Cancer        VITALS  Vital Signs Last 24 Hrs  T(C): 36.9 (13 Jun 2021 12:27), Max: 36.9 (13 Jun 2021 12:27)  T(F): 98.4 (13 Jun 2021 12:27), Max: 98.4 (13 Jun 2021 12:27)  HR: 74 (13 Jun 2021 12:27) (63 - 74)  BP: 149/75 (13 Jun 2021 12:27) (119/64 - 149/75)   RR: 17 (13 Jun 2021 12:27) (16 - 18)  SpO2: 100% (13 Jun 2021 12:27) (98% - 100%)          MEDICATIONS  (STANDING):  artificial  tears Solution 1 Drop(s) Both EYES two times a day  finasteride 5 milliGRAM(s) Oral daily  ketorolac 0.5% Ophthalmic Solution 1 Drop(s) Both EYES two times a day  pantoprazole  Injectable 40 milliGRAM(s) IV Push every 12 hours  tamsulosin 0.4 milliGRAM(s) Oral at bedtime    MEDICATIONS  (PRN):                            9.7    5.13  )-----------( 180      ( 13 Jun 2021 05:46 )             29.2       06-13    134<L>  |  102  |  18  ----------------------------<  89  4.2   |  22  |  0.85    Ca    8.4      13 Jun 2021 05:46  Phos  3.5     06-13  Mg     2.1     06-13

## 2021-06-14 ENCOUNTER — RESULT REVIEW (OUTPATIENT)
Age: 76
End: 2021-06-14

## 2021-06-14 ENCOUNTER — TRANSCRIPTION ENCOUNTER (OUTPATIENT)
Age: 76
End: 2021-06-14

## 2021-06-14 DIAGNOSIS — D64.9 ANEMIA, UNSPECIFIED: ICD-10-CM

## 2021-06-14 DIAGNOSIS — R19.00 INTRA-ABDOMINAL AND PELVIC SWELLING, MASS AND LUMP, UNSPECIFIED SITE: ICD-10-CM

## 2021-06-14 LAB
ANION GAP SERPL CALC-SCNC: 12 MMOL/L — SIGNIFICANT CHANGE UP (ref 5–17)
B2 MICROGLOB SERPL-MCNC: 3.9 MG/L — HIGH (ref 0.8–2.2)
BILIRUB DIRECT SERPL-MCNC: 0.4 MG/DL — HIGH (ref 0–0.2)
BILIRUB INDIRECT FLD-MCNC: 2.2 MG/DL — HIGH (ref 0.2–1)
BILIRUB SERPL-MCNC: 2.6 MG/DL — HIGH (ref 0.2–1.2)
BUN SERPL-MCNC: 21 MG/DL — HIGH (ref 7–18)
CALCIUM SERPL-MCNC: 8.5 MG/DL — SIGNIFICANT CHANGE UP (ref 8.4–10.5)
CHLORIDE SERPL-SCNC: 101 MMOL/L — SIGNIFICANT CHANGE UP (ref 96–108)
CO2 SERPL-SCNC: 23 MMOL/L — SIGNIFICANT CHANGE UP (ref 22–31)
CREAT SERPL-MCNC: 0.94 MG/DL — SIGNIFICANT CHANGE UP (ref 0.5–1.3)
GGT SERPL-CCNC: 25 U/L — SIGNIFICANT CHANGE UP (ref 9–50)
GLUCOSE SERPL-MCNC: 95 MG/DL — SIGNIFICANT CHANGE UP (ref 70–99)
HAPTOGLOB SERPL-MCNC: 32 MG/DL — LOW (ref 34–200)
HCT VFR BLD CALC: 29.5 % — LOW (ref 39–50)
HGB BLD-MCNC: 9.8 G/DL — LOW (ref 13–17)
LDH SERPL L TO P-CCNC: 162 U/L — SIGNIFICANT CHANGE UP (ref 120–225)
MAGNESIUM SERPL-MCNC: 2.1 MG/DL — SIGNIFICANT CHANGE UP (ref 1.6–2.6)
MCHC RBC-ENTMCNC: 27.3 PG — SIGNIFICANT CHANGE UP (ref 27–34)
MCHC RBC-ENTMCNC: 33.2 GM/DL — SIGNIFICANT CHANGE UP (ref 32–36)
MCV RBC AUTO: 82.2 FL — SIGNIFICANT CHANGE UP (ref 80–100)
NRBC # BLD: 0 /100 WBCS — SIGNIFICANT CHANGE UP (ref 0–0)
PHOSPHATE SERPL-MCNC: 2.8 MG/DL — SIGNIFICANT CHANGE UP (ref 2.5–4.5)
PLATELET # BLD AUTO: 172 K/UL — SIGNIFICANT CHANGE UP (ref 150–400)
POTASSIUM SERPL-MCNC: 4.2 MMOL/L — SIGNIFICANT CHANGE UP (ref 3.5–5.3)
POTASSIUM SERPL-SCNC: 4.2 MMOL/L — SIGNIFICANT CHANGE UP (ref 3.5–5.3)
RBC # BLD: 3.59 M/UL — LOW (ref 4.2–5.8)
RBC # BLD: 3.75 M/UL — LOW (ref 4.2–5.8)
RBC # FLD: 19.6 % — HIGH (ref 10.3–14.5)
RETICS #: 95.6 K/UL — SIGNIFICANT CHANGE UP (ref 25–125)
RETICS/RBC NFR: 2.6 % — HIGH (ref 0.5–2.5)
SODIUM SERPL-SCNC: 136 MMOL/L — SIGNIFICANT CHANGE UP (ref 135–145)
WBC # BLD: 5.52 K/UL — SIGNIFICANT CHANGE UP (ref 3.8–10.5)
WBC # FLD AUTO: 5.52 K/UL — SIGNIFICANT CHANGE UP (ref 3.8–10.5)

## 2021-06-14 PROCEDURE — 88312 SPECIAL STAINS GROUP 1: CPT | Mod: 26

## 2021-06-14 PROCEDURE — 88305 TISSUE EXAM BY PATHOLOGIST: CPT | Mod: 26

## 2021-06-14 RX ORDER — PANTOPRAZOLE SODIUM 20 MG/1
40 TABLET, DELAYED RELEASE ORAL
Refills: 0 | Status: DISCONTINUED | OUTPATIENT
Start: 2021-06-14 | End: 2021-06-15

## 2021-06-14 RX ADMIN — PANTOPRAZOLE SODIUM 40 MILLIGRAM(S): 20 TABLET, DELAYED RELEASE ORAL at 05:52

## 2021-06-14 RX ADMIN — FINASTERIDE 5 MILLIGRAM(S): 5 TABLET, FILM COATED ORAL at 11:15

## 2021-06-14 RX ADMIN — TAMSULOSIN HYDROCHLORIDE 0.4 MILLIGRAM(S): 0.4 CAPSULE ORAL at 21:58

## 2021-06-14 RX ADMIN — Medication 1 DROP(S): at 17:44

## 2021-06-14 RX ADMIN — Medication 1 DROP(S): at 05:52

## 2021-06-14 NOTE — CONSULT NOTE ADULT - SUBJECTIVE AND OBJECTIVE BOX
HPI:  75 y/o male with MHx BPH, Hypertension , hard of hearing, presents to ED with low Hb. Patient reports that he woke up in the morning with upper abdominal pain and bloating. He also reports1 episode of dark BM, and reports getting weak and dizzy while he was climbing up the stairs.   Patient denies shortness of breath, nausea and vomiting, seizures, chest pain, LOC, periods of confusion, diarrhea, urinary compliants   he was seen by my associate  Dr Flores for anmia.      ED course:  Na 122  s/p 1 NS bolus     Vital Signs Last 24 Hrs  T(C): 36.4 (10 Albert 2021 20:24), Max: 36.4 (10 Albert 2021 14:13)  T(F): 97.5 (10 Albert 2021 20:24), Max: 97.6 (10 Albert 2021 14:13)  HR: 68 (10 Albert 2021 20:24) (68 - 85)  BP: 107/53 (10 Albert 2021 20:24) (107/53 - 113/56)  RR: 17 (10 Albert 2021 20:24) (16 - 17)  SpO2: 100% (10 Albert 2021 20:24) (99% - 100%) (10 Albert 2021 17:29)     Pt is seen and examined  pt is awake and lying in bed/out of bed to chair  pt seems comfortable and denies any complaints at this time    ROS:  Negative except for:    MEDICATIONS  (STANDING):  artificial  tears Solution 1 Drop(s) Both EYES two times a day  finasteride 5 milliGRAM(s) Oral daily  ketorolac 0.5% Ophthalmic Solution 1 Drop(s) Both EYES two times a day  pantoprazole  Injectable 40 milliGRAM(s) IV Push every 12 hours  tamsulosin 0.4 milliGRAM(s) Oral at bedtime    MEDICATIONS  (PRN):      Allergies    No Known Allergies    Intolerances        Vital Signs Last 24 Hrs  T(C): 36.8 (14 Jun 2021 05:26), Max: 36.9 (13 Jun 2021 12:27)  T(F): 98.3 (14 Jun 2021 05:26), Max: 98.4 (13 Jun 2021 12:27)  HR: 84 (14 Jun 2021 05:26) (69 - 84)  BP: 136/89 (14 Jun 2021 05:26) (121/61 - 149/75)  BP(mean): --  RR: 16 (14 Jun 2021 05:26) (16 - 18)  SpO2: 98% (14 Jun 2021 05:26) (98% - 100%)    PHYSICAL EXAM  General: adult in NAD  HEENT: clear oropharynx, anicteric sclera, pink conjunctiva  Neck: supple  CV: normal S1/S2 with no murmur rubs or gallops  Lungs: positive air movement b/l ant lungs,clear to auscultation, no wheezes, no rales  Abdomen: soft non-tender non-distended, no hepatosplenomegaly  Ext: no clubbing cyanosis or edema  Skin: no rashes and no petechiae  Neuro: alert and oriented X 4, no focal deficits  LABS:                          9.8    5.52  )-----------( 172      ( 14 Jun 2021 07:38 )             29.5         Mean Cell Volume : 82.2 fl  Mean Cell Hemoglobin : 27.3 pg  Mean Cell Hemoglobin Concentration : 33.2 gm/dL  Auto Neutrophil # : x  Auto Lymphocyte # : x  Auto Monocyte # : x  Auto Eosinophil # : x  Auto Basophil # : x  Auto Neutrophil % : x  Auto Lymphocyte % : x  Auto Monocyte % : x  Auto Eosinophil % : x  Auto Basophil % : x    Serial CBC  Hematocrit 29.5  Hemoglobin 9.8  Plat 172  RBC 3.59  WBC 5.52  Serial CBC  Hematocrit 29.2  Hemoglobin 9.7  Plat 180  RBC 3.57  WBC 5.13  Serial CBC  Hematocrit 28.7  Hemoglobin 9.4  Plat 180  RBC 3.49  WBC 5.59  Serial CBC  Hematocrit 26.0  Hemoglobin 8.5  Plat 172  RBC 3.17  WBC 6.18  Serial CBC  Hematocrit 20.5  Hemoglobin 6.6  Plat 181  RBC 2.57  WBC 6.72  Serial CBC  Hematocrit 22.4  Hemoglobin 7.3  Plat 242  RBC 2.84  WBC 9.44    06-14    136  |  101  |  21<H>  ----------------------------<  95  4.2   |  23  |  0.94    Ca    8.5      14 Jun 2021 07:38  Phos  2.8     06-14  Mg     2.1     06-14            Ferritin, Serum: 254 ng/mL (06-11 @ 12:08)  Iron - Total Binding Capacity.: 188 ug/dL (06-11 @ 07:15)  Iron - Total Binding Capacity.: 174 ug/dL (06-10 @ 21:30)            BLOOD SMEAR INTERPRETATION:       RADIOLOGY & ADDITIONAL STUDIES:    < from: CT Angio Abdomen and Pelvis w/ IV Cont (06.10.21 @ 17:32) >  PROCEDURE:  CT of the Abdomen and Pelvis was performed.  Precontrast, Arterial and Delayed phases were performed.  Sagittal and coronal reformats were performed.    FINDINGS:  LOWER CHEST: Within normal limits.    LIVER: Subcentimeter lesion in the right hepatic lobe, too small to characterize.  BILE DUCTS: Normal caliber.  GALLBLADDER: Cholelithiasis.  SPLEEN: Within normal limits.  PANCREAS: Within normal limits.  ADRENALS: Within normal limits.  KIDNEYS/URETERS: Moderate left hydronephrosis, unchanged. Bilateral renal cortical scarring.    BLADDER: Within normal limits.  REPRODUCTIVE ORGANS: Enlarged prostate.    BOWEL: No bowel obstruction. Appendix is not visualized. Diverticulosis, without acute diverticulitis. No evidence of active GI bleed.  PERITONEUM: No ascites.  VESSELS: Atherosclerotic calcifications.  RETROPERITONEUM/LYMPH NODES: Stable confluent soft tissue density in the retroperitoneum extending along the course of the aorta and iliac vessels, predominantly on the left. Stable prominent right sided retroperitoneal lymph node measuring 1.1 cm (19:42)  ABDOMINAL WALL: Left inguinal postoperative changes.  BONES: Within normal limits.    IMPRESSION:  No evidence of active GI bleed.  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 Other etiologies for retroperitoneal mass such as lymphoma are not excluded.    < end of copied text >  
Problem List:  Hyponatremia   Anemia  75 y/o male with MHx BPH, Hypertension , hard of hearing, presents to ED with low Hb. Patient reports that he woke up in the morning with upper abdominal pain and bloating. He also reports1 episode of dark BM, and reports getting weak and dizzy while he was climbing up the stairs.   Patient denies shortness of breath, nausea and vomiting, seizures, chest pain, LOC, periods of confusion, diarrhea, and abdominal discomfort.    PAST MEDICAL & SURGICAL HISTORY:  HTN (hypertension)    BPH (benign prostatic hyperplasia)    HTN (hypertension)    BPH (benign prostatic hyperplasia)    Abdominal hernia    Bilateral inguinal hernia        No Known Allergies      MEDICATIONS  (STANDING):  artificial  tears Solution 1 Drop(s) Both EYES two times a day  dextrose 5%. 1000 milliLiter(s) (80 mL/Hr) IV Continuous <Continuous>  finasteride 5 milliGRAM(s) Oral daily  ketorolac 0.5% Ophthalmic Solution 1 Drop(s) Both EYES two times a day  pantoprazole  Injectable 40 milliGRAM(s) IV Push every 12 hours  tamsulosin 0.4 milliGRAM(s) Oral at bedtime    MEDICATIONS  (PRN):    Admission sodium 122                        8.5    6.18  )-----------( 172      ( 11 Jun 2021 07:15 )             26.0     06-11    134<L>  |  101  |  22<H>  ----------------------------<  81  3.7   |  24  |  0.82    Ca    8.6      11 Jun 2021 07:15  Phos  3.5     06-11  Mg     2.2     06-11    TPro  5.9<L>  /  Alb  3.3<L>  /  TBili  2.3<H>  /  DBili  0.4<H>  /  AST  12  /  ALT  17  /  AlkPhos  59  06-11    PT/INR - ( 10 Albert 2021 15:33 )   PT: 12.3 sec;   INR: 1.04 ratio         PTT - ( 10 Albert 2021 15:33 )  PTT:24.1 sec    Sodium, Random Urine: 70 mmol/L (06-10 @ 22:25)  Creatinine, Random Urine: 18 mg/dL (06-10 @ 22:25)        REVIEW OF SYSTEMS:  General: no fever no chills, no weight loss.    RESPIRATORY: No cough, wheezing, hemoptysis; No shortness of breath  CARDIOVASCULAR: No chest pain or palpitations. No Edema  GASTROINTESTINAL: No abdominal or epigastric pain. No nausea, vomiting. No diarrhea or constipation. No melena.  GENITOURINARY: No dysuria, frequency, foamy urine, urinary urgency, incontinence or hematuria  NEUROLOGICAL: No numbness or weakness, no tremor , no dizziness.         VITALS:  T(F): 97.7 (06-11-21 @ 05:43), Max: 98.3 (06-11-21 @ 02:40)  HR: 66 (06-11-21 @ 05:43)  BP: 130/73 (06-11-21 @ 05:43)  RR: 16 (06-11-21 @ 05:43)  SpO2: 99% (06-11-21 @ 05:43)  Wt(kg): --    06-10 @ 07:01  -  06-11 @ 07:00  --------------------------------------------------------  IN: 700 mL / OUT: 600 mL / NET: 100 mL      Height (cm): 167.6 (06-10 @ 14:13)  Weight (kg): 65.8 (06-10 @ 14:13)  BMI (kg/m2): 23.4 (06-10 @ 14:13)  BSA (m2): 1.74 (06-10 @ 14:13)  PHYSICAL EXAM:  Constitutional: well developed, no diaphoresis, no distress.  Neck: No JVD, no carotid bruit, supple  Respiratory: Good air entrance B/L, no wheezes, rales or rhonchi  Cardiovascular: S1, S2, RRR, no pericardial rub, no murmur  Abdomen: BS+, soft, no tenderness, no bruit  Pelvis: bladder nondistended  Extremities: No cyanosis or clubbing. No peripheral edema.   Pulses: All present      
[  ] STAT REQUEST              [ X ] ROUTINE REQUEST    Patient is a 76 year old male with GI bleeding . GI consulted to evaluate.        HPI:  76 year old male with past medical history significant for BPH, Hypertension , hard of hearing, presents to ED with low Hb. Patient reports that he woke up in the morning with upper abdominal pain and bloating. He also reports1 episode of dark BM, and reports getting weak and dizzy while he was climbing up the stairs.   Patient denies hematemesis, hematochezia, fever, chills, chest pain, SOB, cough, hematuria, epistaxis, hemoptysis, dysuria or diarrhea.            PAIN MANAGEMENT:  Pain Scale:                 0/10  Pain Location:      Prior Colonoscopy:  No prior colonoscopy    PAST MEDICAL HISTORY  HTN   BPH         PAST SURGICAL HISTORY   Bilateral inguinal hernia repair        Allergies    No Known Allergies    Intolerances  None       MEDICATIONS  (STANDING):  artificial  tears Solution 1 Drop(s) Both EYES two times a day  dextrose 5%. 1000 milliLiter(s) (80 mL/Hr) IV Continuous <Continuous>  finasteride 5 milliGRAM(s) Oral daily  ketorolac 0.5% Ophthalmic Solution 1 Drop(s) Both EYES two times a day  pantoprazole  Injectable 40 milliGRAM(s) IV Push every 12 hours  tamsulosin 0.4 milliGRAM(s) Oral at bedtime         SOCIAL HISTORY  Advanced Directives:       [ X ] Full Code       [  ] DNR  Marital Status:         [  ] M      [ X ] S      [  ] D       [  ] W  Children:       [ X ] Yes      [  ] No  Occupation:        [  ] Employed       [ X ] Unemployed       [  ] Retired  Diet:       [ X ] Regular       [  ] PEG feeding          [  ] NG tube feeding  Drug Use:           [ X ] Patient denied          [  ] Yes  Alcohol:           [ X ] No             [  ] Yes (socially)         [  ] Yes (chronic)  Tobacco:           [  ] Yes           [ X ] No      FAMILY HISTORY  [ X ] Heart Disease            [ X ] Diabetes             [ X ] HTN             [  ] Colon Cancer             [  ] Stomach Cancer              [  ] Pancreatic Cancer      VITAL SIGNS   Vital Signs Last 24 Hrs  T(C): 37 (11 Jun 2021 13:48), Max: 37 (11 Jun 2021 13:48)  T(F): 98.6 (11 Jun 2021 13:48), Max: 98.6 (11 Jun 2021 13:48)  HR: 65 (11 Jun 2021 13:48) (60 - 73)  BP: 148/75 (11 Jun 2021 13:48) (99/46 - 148/75)   RR: 20 (11 Jun 2021 13:48) (15 - 20)  SpO2: 100% (11 Jun 2021 13:48) (99% - 100%)      CBC Full  -  ( 11 Jun 2021 07:15 )  WBC Count : 6.18 K/uL  RBC Count : 3.17 M/uL  Hemoglobin : 8.5 g/dL  Hematocrit : 26.0 %  Platelet Count - Automated : 172 K/uL  Mean Cell Volume : 82.0 fl  Mean Cell Hemoglobin : 26.8 pg  Mean Cell Hemoglobin Concentration : 32.7 gm/dL  Auto Neutrophil # : 2.54 K/uL  Auto Lymphocyte # : 3.39 K/uL  Auto Monocyte # : 0.21 K/uL  Auto Eosinophil # : 0.01 K/uL  Auto Basophil # : 0.00 K/uL  Auto Neutrophil % : 41.0 %  Auto Lymphocyte % : 54.9 %  Auto Monocyte % : 3.4 %  Auto Eosinophil % : 0.2 %  Auto Basophil % : 0.0 %      06-11    129<L>  |  98  |  20<H>  ----------------------------<  96  4.0   |  24  |  0.85    Ca    8.1<L>      11 Jun 2021 13:44  Phos  3.5     06-11  Mg     2.2     06-11    TPro  5.9<L>  /  Alb  3.3<L>  /  TBili  2.3<H>  /  DBili  0.4<H>  /  AST  12  /  ALT  17  /  AlkPhos  59  06-11         PT/INR - ( 10 Albert 2021 15:33 )   PT: 12.3 sec;   INR: 1.04 ratio       PTT - ( 10 Albert 2021 15:33 )  PTT:24.1 sec    Occult Blood, Feces (06.10.21 @ 14:58)   Occult Blood, Feces: Negative     Iron with Total Binding Capacity in AM (06.11.21 @ 07:15)   Iron - Total Binding Capacity.: 188 ug/dL   % Saturation, Iron: 78 %   Iron Total, Serum: 147 ug/dL   Unsaturated Iron Binding Capacity: 41 ug/dL      Urinalysis (10.17.20 @ 18:12)   Glucose Qualitative, Urine: Negative mg/dL   Blood, Urine: Moderate   pH Urine: 8.0   Color: Yellow   Urine Appearance: Clear   Bilirubin: Negative   Ketone - Urine: Negative   Specific Gravity: 1.010   Protein, Urine: 15 mg/dL   Urobilinogen: Negative mg/dL   Nitrite: Negative   Leukocyte Esterase Concentration: Moderate        ECG  Ventricular Rate 91 BPM    Atrial Rate 91 BPM    P-R Interval 226 ms    QRS Duration 106 ms    Q-T Interval 402 ms    QTC Calculation(Bezet) 494 ms    P Axis 60 degrees    R Axis 5 degrees    T Axis 52 degrees    Diagnosis Line Sinus rhythm with 1st degree A-V block  Prolonged QT  Abnormal ECG       RADIOLOGY/IMAGING                  EXAM:  CT ANGIO ABD PELV (W)AW IC                            PROCEDURE DATE:  06/10/2021          INTERPRETATION:  CLINICAL INFORMATION: GI bleed.    COMPARISON: 10/17/2020    CONTRAST/COMPLICATIONS:  IV Contrast: Omnipaque 350  90 cc administered 10 cc discarded  Oral Contrast: NONE  Complications: None reported at time of study completion    PROCEDURE:  CT of the Abdomen and Pelvis was performed.  Precontrast, Arterial and Delayed phases were performed.  Sagittal and coronal reformats were performed.    FINDINGS:  LOWER CHEST: Within normal limits.    LIVER: Subcentimeter lesion in the right hepatic lobe, too small to characterize.  BILE DUCTS: Normal caliber.  GALLBLADDER: Cholelithiasis.  SPLEEN: Within normal limits.  PANCREAS: Within normal limits.  ADRENALS: Within normal limits.  KIDNEYS/URETERS: Moderate left hydronephrosis, unchanged. Bilateral renal cortical scarring.    BLADDER: Within normal limits.  REPRODUCTIVE ORGANS: Enlarged prostate.    BOWEL: No bowel obstruction. Appendix is not visualized. Diverticulosis, without acute diverticulitis. No evidence of active GI bleed.  PERITONEUM: No ascites.  VESSELS: Atherosclerotic calcifications.  RETROPERITONEUM/LYMPH NODES: Stable confluent soft tissue density in the retroperitoneum extending along the course of the aorta and iliac vessels, predominantly on the left. Stable prominent right sided retroperitoneal lymph node measuring 1.1 cm (19:42)  ABDOMINAL WALL: Left inguinal postoperative changes.  BONES: Within normal limits.    IMPRESSION:  No evidence of active GI bleed.  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 Other etiologies for retroperitoneal mass such as lymphoma are not excluded.

## 2021-06-14 NOTE — DISCHARGE NOTE PROVIDER - CARE PROVIDER_API CALL
AKASH ROMAN  [id number]  [address]  [place], [place] [address]  Phone: [phone]  Fax: [phone]  Follow Up Time: 1 week    Jaleel Bucio)  Medicine  [address] [facility], [address] [place] [address]  Phone: [phone]  Fax: [phone]  Follow Up Time: 2 weeks    Hubert Elkins  INTERNAL MEDICINE  [address] [place] [address]  Phone: [phone]  Fax: [phone]  Follow Up Time: 1 week   AKASH ROMAN  [id number]  [address] [place] [address], [place] [address]  Phone: [phone]  Fax: [phone]  Follow Up Time: 1 week    Jaleel Bucio)  Medicine  [address] [facility], [address]nd [place] [address]  Phone: [phone]  Fax: [phone]  Follow Up Time: 2 weeks    Jeevan Stoner)  Internal Medicine  [address] [facility], [facility] [address]  [place] [address]  Phone: [phone]  Fax: [phone]  Follow Up Time: 2 weeks

## 2021-06-14 NOTE — PROGRESS NOTE ADULT - SUBJECTIVE AND OBJECTIVE BOX
Colonoscopy Report  Indication: Iron deficiency anemia  Referring: Jaleel Bucio MD  Instrument:  # [id number]  Anesthesia: MAC  Consent:  Informed consent was obtained from the patient after providing any opportunity for questions  Procedure: After placing the patient in the left lateral decubitus position, the colonoscope was gently inserted into the rectum and advanced to the cecum. Color, texture, mucosa, and anatomy of the colon were carefully examined with the scope. The patient tolerated the procedure well. After completion of the exam, the patient was transferred to the recovery room.     Preparation:  Findings:   Anal Canal	        Large internal hemorrhoids  Rectum	                Normal  Sigmoid Colon 	        Few diverticula, retain stool  Descending Colon	Scattered diverticula, retain stool  Splenic Flexure	Normal  Transverse Colon	Scattered diverticula, Retain stool  Hepatic Flexure	Normal  Ascending Colon	 A few diverticula, retain stool  Cecum	                 Retain stool  Ileo-cecal Valve	 Normal  Ileum 	                 Not intubated     EBL:0    Impression:     1. Diverticulosis  2. Poor prep  3. Internal hemorrhoids    Plan:  1. Follow up path  2. High fiber diet  3. Sitz bath      Procedure Start Time:  15:22  Cecum Reached Time:  15:26  Procedure End Time:   15:32  Total Withdrawal Time: 6 minutes      Attending:     Jaleel Bucio MD

## 2021-06-14 NOTE — PROGRESS NOTE ADULT - PROBLEM SELECTOR PLAN 4
Continue home regimen
likely due to distal tubule diuresis vs. hypovolemia  improved after IVF  134 ---> 136  trend BMP  Nephrology Dr. Littlejohn

## 2021-06-14 NOTE — PROGRESS NOTE ADULT - PROBLEM SELECTOR PLAN 3
Normotensive  Home regimen held in the setting of GIB  Consider restarting Lisinopril at low dose if BP elevates
fibrosis vs. lymphoma vs. sarcoma  had PET scan in February at [facility] that was normal  due for outpatient PET scan for surveillance  Heme/Onc Dr. Elkins following

## 2021-06-14 NOTE — DISCHARGE NOTE PROVIDER - PROVIDER TOKENS
PROVIDER:[TOKEN:[[id number]],FOLLOWUP:[1 week]],PROVIDER:[TOKEN:[[id number]],FOLLOWUP:[2 weeks]],PROVIDER:[TOKEN:[[id number]],FOLLOWUP:[1 week]] PROVIDER:[TOKEN:[[id number]],FOLLOWUP:[1 week]],PROVIDER:[TOKEN:[[id number]],FOLLOWUP:[2 weeks]],PROVIDER:[TOKEN:[[id number]],FOLLOWUP:[2 weeks]]

## 2021-06-14 NOTE — PROGRESS NOTE ADULT - SUBJECTIVE AND OBJECTIVE BOX
`NP Note discussed with  Primary Attending    Patient is a 76y old  Male who presents with a chief complaint of EGD with biopsy (14 Jun 2021 15:42)      INTERVAL HPI/OVERNIGHT EVENTS: no new complaints    MEDICATIONS  (STANDING):  artificial  tears Solution 1 Drop(s) Both EYES two times a day  finasteride 5 milliGRAM(s) Oral daily  ketorolac 0.5% Ophthalmic Solution 1 Drop(s) Both EYES two times a day  pantoprazole  Injectable 40 milliGRAM(s) IV Push every 12 hours  tamsulosin 0.4 milliGRAM(s) Oral at bedtime    MEDICATIONS  (PRN):      __________________________________________________  REVIEW OF SYSTEMS:    CONSTITUTIONAL: No fever,   EYES: no acute visual disturbances  NECK: No pain or stiffness  RESPIRATORY: No cough; No shortness of breath  CARDIOVASCULAR: No chest pain, no palpitations  GASTROINTESTINAL: No pain. No nausea or vomiting; No diarrhea   NEUROLOGICAL: No headache or numbness, no tremors  MUSCULOSKELETAL: No joint pain, no muscle pain  GENITOURINARY: no dysuria, no frequency, no hesitancy  PSYCHIATRY: no depression , no anxiety  ALL OTHER  ROS negative        Vital Signs Last 24 Hrs  T(C): 36.7 (14 Jun 2021 14:30), Max: 36.8 (13 Jun 2021 19:28)  T(F): 98.1 (14 Jun 2021 14:30), Max: 98.3 (14 Jun 2021 05:26)  HR: 72 (14 Jun 2021 14:30) (69 - 84)  BP: 114/65 (14 Jun 2021 14:30) (114/65 - 136/89)  BP(mean): --  RR: 18 (14 Jun 2021 14:30) (16 - 18)  SpO2: 100% (14 Jun 2021 14:30) (98% - 100%)    ________________________________________________  PHYSICAL EXAM:  GENERAL: NAD  HEENT: Normocephalic;  conjunctivae and sclerae clear; moist mucous membranes;   NECK : supple  CHEST/LUNG: Clear to auscultation bilaterally with good air entry   HEART: S1 S2  regular; no murmurs, gallops or rubs  ABDOMEN: Soft, Nontender, Nondistended; Bowel sounds present  EXTREMITIES: no cyanosis; no edema; no calf tenderness  SKIN: warm and dry; no rash  NERVOUS SYSTEM:  Awake and alert; Oriented  to place, person and time ; no new deficits    _________________________________________________  LABS:                        9.8    5.52  )-----------( 172      ( 14 Jun 2021 07:38 )             29.5     06-14    136  |  101  |  21<H>  ----------------------------<  95  4.2   |  23  |  0.94    Ca    8.5      14 Jun 2021 07:38  Phos  2.8     06-14  Mg     2.1     06-14    TPro  x   /  Alb  x   /  TBili  2.6<H>  /  DBili  0.4<H>  /  AST  x   /  ALT  x   /  AlkPhos  x   06-14        CAPILLARY BLOOD GLUCOSE            RADIOLOGY & ADDITIONAL TESTS:    Imaging  Reviewed:  YES  < from: CT Angio Abdomen and Pelvis w/ IV Cont (06.10.21 @ 17:32) >    IMPRESSION:  No evidence of active GI bleed.  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 Other etiologies for retroperitoneal mass such as lymphoma are not excluded.    < end of copied text >      Consultant(s) Notes Reviewed:   YES      Plan of care was discussed with patient and /or primary care giver; all questions and concerns were addressed

## 2021-06-14 NOTE — PROGRESS NOTE ADULT - PROBLEM SELECTOR PLAN 6
Pt admitted from home where he is independent  Will likely return home upon discharge  Care management following for discharge planning
chemoprophylaxis contraindicated in the setting of anemia, continue with SCD's  GI: PPI

## 2021-06-14 NOTE — CONSULT NOTE ADULT - ASSESSMENT
76 year old male with anemia had a dark stool and worsening anemia.    1. anemia,  ferritin, B12, folate normal  ?GIB  for colonoscopy  but he can have other causes of anemia  will r/o hemolysis, myeloma    2. retroperitoneal mass  ?fibrosis, lymphoma, sarcoma  will need outpt PET scan  
Hyponatremia , due to HCTZ and possible increased water intake  Sodium increased from 122-139 in 24 hours  No changes in MS  Suggest to sart IV fluids, hypotonic D5W and follow sodium q 8 hours and now.    
1. The etiology for anemia in this patient is multifactorial  2. No evidence of acute GI bleeding  3. R/o chronic GI bleeding  4. R/o Peptic ulcer disease  5. R/o colorectal neoplasm  6. R/o AVM's    Suggestions:    1. Monitor H/H  2. Transfuse PRBC as needed  3. Protonix 40mg daily  4. Avoid NSAID  5. EGD and colonoscopy  6. DVT prophylaxis

## 2021-06-14 NOTE — PROGRESS NOTE ADULT - PROBLEM SELECTOR PLAN 1
trend CBC, transfuse for Hgb > 7  r/o hemolysis vs. myeloma  f/u beta 2 microglobulin, haptoglobin, immunofixation, & immunoglobulin free light chains  total iron 147, ferritin normal  lactate dehydrogenase and reticulocyte count normal  Heme/Onc: Dr. Elkins trend CBC, transfuse for Hgb > 7  r/o hemolysis vs. myeloma  f/u beta 2 microglobulin, haptoglobin, immunofixation, & immunoglobulin free light chains  total iron 147, ferritin normal  lactate dehydrogenase and reticulocyte count normal  follows with Dr. Flores as OP  Heme/Onc: Dr. Elkins

## 2021-06-14 NOTE — DISCHARGE NOTE PROVIDER - HOSPITAL COURSE
76 year old [facility] male with past medical history of BPH and HTN who presented to the ED with c/o abdominal pain, bloating, weakness and dizziness. He was noted to be anemic with Hemoglobin at 6.6 for which   he received 2 units of PRBC's and GI was consulted. Nephrology onboard for hyponatremia and heme/onc following for RP mass and anemia. Patient is now s/p colonoscopy with results showing: XXXX      <<<INCOMPLETE>>> 76 year old [place] male with past medical history of BPH and HTN who presented to the ED with c/o abdominal pain, bloating, weakness and dizziness. He was noted to be anemic with Hemoglobin at 6.6 for which   he received 2 units of PRBC's and GI was consulted. Nephrology onboard for hyponatremia and heme/onc following for RP mass and anemia. Patient is now s/p EGD/colonoscopy with results showing: diverticulosis, internal hemorrhoids, gastritis, esophagitis, hiatal hernia and gastric ulcer with biopsies taken. RUQ ultrasound ordered for hyperbilirubinemia showing XXX      <<<INCOMPLETE>>> 76 year old [place] male with past medical history of BPH and HTN who presented to the ED with c/o abdominal pain, bloating, weakness and dizziness. He was noted to be anemic with Hemoglobin at 6.6 for which she received 2 units of PRBC's and GI was consulted. Nephrology onboard for hyponatremia and heme/onc following for RP mass and anemia. Patient is now s/p EGD/colonoscopy with results showing: diverticulosis, internal hemorrhoids, gastritis, esophagitis, hiatal hernia and gastric ulcer with biopsies taken. RUQ ultrasound ordered for hyperbilirubinemia showing Cholelithiasis. No biliary dilatation. No acute diagnostic findings. Limited evaluation pancreas.   Pt has clinically improved and medically optimized for discharge as out patient  Please note that this a brief summary of hospital course please refer to daily progress notes and consult notes for full course and events

## 2021-06-14 NOTE — PROGRESS NOTE ADULT - PROBLEM SELECTOR PLAN 2
AST/ALT/ALP normal  CT with subcentimeter lesion  f/u gamma glutamyl transferase  hemolytic anemia workup as above   trend LFT's  RUQ ultrasound tomorrow AM, NPO at midnight
Na 129 (122 on admission)  Follow up BMP Q8H  Dr. Littlejohn, Nephrology, following

## 2021-06-14 NOTE — PROGRESS NOTE ADULT - SUBJECTIVE AND OBJECTIVE BOX
Patient is a 76y old  Male who presents with a chief complaint of GI bleed (13 Jun 2021 18:39)    pt seen in icu [  ], reg med floor [ x  ], bed [ x ], chair at bedside [   ], a+o x3 [x ], lethargic [  ],    nad [ x ]        Allergies    No Known Allergies        Vitals    T(F): 98.3 (06-14-21 @ 05:26), Max: 98.4 (06-13-21 @ 12:27)  HR: 84 (06-14-21 @ 05:26) (69 - 84)  BP: 136/89 (06-14-21 @ 05:26) (121/61 - 149/75)  RR: 16 (06-14-21 @ 05:26) (16 - 18)  SpO2: 98% (06-14-21 @ 05:26) (98% - 100%)  Wt(kg): --  CAPILLARY BLOOD GLUCOSE          Labs                          9.7    5.13  )-----------( 180      ( 13 Jun 2021 05:46 )             29.2       06-13    134<L>  |  102  |  18  ----------------------------<  89  4.2   |  22  |  0.85    Ca    8.4      13 Jun 2021 05:46  Phos  3.5     06-13  Mg     2.1     06-13                  Radiology Results      Meds    MEDICATIONS  (STANDING):  artificial  tears Solution 1 Drop(s) Both EYES two times a day  finasteride 5 milliGRAM(s) Oral daily  ketorolac 0.5% Ophthalmic Solution 1 Drop(s) Both EYES two times a day  pantoprazole  Injectable 40 milliGRAM(s) IV Push every 12 hours  tamsulosin 0.4 milliGRAM(s) Oral at bedtime      MEDICATIONS  (PRN):      Physical Exam    Neuro :  no focal deficits  Respiratory: CTA B/L  CV: RRR, S1S2, no murmurs,   Abdominal: Soft, NT, ND +BS,  Extremities: No edema, + peripheral pulses        ASSESSMENT    Anemia   r/o gi bleed,   hyponatremia,   h/o HTN (hypertension)  BPH (benign prostatic hyperplasia)  Abdominal hernia  Bilateral inguinal hernia        PLAN    s/p transfusion 2 prbc   cont protonix,   gi f/u   f/u egd, colonoscopy  stool occult blood x3,   Hyponatremia , due to HCTZ and possible increased water intake  renal f/u  Sodium increased from 122-139 in 24 hours  No changes in MS  sarted IV fluids, hypotonic D5W and follow sodium q 8 hours  hydralazine held 2nd to hyponatremia   serum sodium improving   cont current meds         Patient is a 76y old  Male who presents with a chief complaint of GI bleed (13 Jun 2021 18:39)    pt seen in icu [  ], reg med floor [ x  ], bed [ x ], chair at bedside [   ], a+o x3 [x ], lethargic [  ],    nad [ x ]        Allergies    No Known Allergies        Vitals    T(F): 98.3 (06-14-21 @ 05:26), Max: 98.4 (06-13-21 @ 12:27)  HR: 84 (06-14-21 @ 05:26) (69 - 84)  BP: 136/89 (06-14-21 @ 05:26) (121/61 - 149/75)  RR: 16 (06-14-21 @ 05:26) (16 - 18)  SpO2: 98% (06-14-21 @ 05:26) (98% - 100%)  Wt(kg): --  CAPILLARY BLOOD GLUCOSE          Labs                          9.7    5.13  )-----------( 180      ( 13 Jun 2021 05:46 )             29.2       06-13    134<L>  |  102  |  18  ----------------------------<  89  4.2   |  22  |  0.85    Ca    8.4      13 Jun 2021 05:46  Phos  3.5     06-13  Mg     2.1     06-13                  Radiology Results      Meds    MEDICATIONS  (STANDING):  artificial  tears Solution 1 Drop(s) Both EYES two times a day  finasteride 5 milliGRAM(s) Oral daily  ketorolac 0.5% Ophthalmic Solution 1 Drop(s) Both EYES two times a day  pantoprazole  Injectable 40 milliGRAM(s) IV Push every 12 hours  tamsulosin 0.4 milliGRAM(s) Oral at bedtime      MEDICATIONS  (PRN):      Physical Exam    Neuro :  no focal deficits  Respiratory: CTA B/L  CV: RRR, S1S2, no murmurs,   Abdominal: Soft, NT, ND +BS,  Extremities: No edema, + peripheral pulses        ASSESSMENT    Anemia   r/o gi bleed,   hyponatremia,   h/o HTN (hypertension)  BPH (benign prostatic hyperplasia)  Abdominal hernia  Bilateral inguinal hernia        PLAN    s/p transfusion 2 prbc   cont protonix,   gi f/u   f/u egd, colonoscopy  stool occult blood x3,   pt with retroperitoneal lesion on ct  heme onc cons  Hyponatremia , due to HCTZ and possible increased water intake  renal f/u  No changes in MS  hydralazine held 2nd to hyponatremia   serum sodium improving   cont current meds

## 2021-06-14 NOTE — PROGRESS NOTE ADULT - PROBLEM SELECTOR PLAN 5
finasteride, tamsulosin
Hold chemical VTE prophylaxis in the setting of GIB  Continue with SCDs   Continue with Protonix

## 2021-06-14 NOTE — DISCHARGE NOTE PROVIDER - NSDCMRMEDTOKEN_GEN_ALL_CORE_FT
Flomax 0.4 mg oral capsule: 1 cap(s) orally once a day   hydroCHLOROthiazide 12.5 mg oral capsule: 1 cap(s) orally once a day  ketorolac 0.4% ophthalmic solution: 1 drop(s) to each affected eye 2 times a day  lisinopril 40 mg oral tablet: 1 tab(s) orally once a day  Proscar 5 mg oral tablet: 1 tab(s) orally once a day  Systane Ultra ophthalmic solution: 1 drop(s) to each affected eye 2 times a day

## 2021-06-14 NOTE — DISCHARGE NOTE PROVIDER - NSDCCPCAREPLAN_GEN_ALL_CORE_FT
PRINCIPAL DISCHARGE DIAGNOSIS  Diagnosis: Anemia  Assessment and Plan of Treatment: Anemia  WHAT YOU NEED TO KNOW:  Anemia is a low number of red blood cells or a low amount of hemoglobin in your red blood cells. Hemoglobin is a protein that helps carry oxygen throughout your body. Red blood cells use iron to create hemoglobin. Anemia may develop if your body does not have enough iron. It may also develop if your body does not make enough red blood cells or they die faster than your body can make them.  Prevent anemia: Eat healthy foods rich in iron and vitamin C. Nuts, meat, dark leafy green vegetables, and beans are high in iron and protein. Vitamin C helps your body absorb iron. Foods rich in vitamin C include oranges and other citrus fruits. Ask your healthcare provider for a list of other foods that are high in iron or vitamin C. Ask if you need to be on a special diet.  DISCHARGE INSTRUCTIONS:  Call 911 or have someone call 911 for any of the following:  You lose consciousness.  You have severe chest pain.  Seek care immediately if:  You have dark or bloody bowel movements.  Contact your healthcare provider if:  Your symptoms are worse, even after treatment.  You have questions or concerns about your condition or care.  You were seen by a gastroenterologist and a hematologist/oncologist.   Please follow up with Dr. Elkins in a week for further workup and evaluation.  Your EGD/Colonoscopy showed:      SECONDARY DISCHARGE DIAGNOSES  Diagnosis: Abnormal abdominal CT scan  Assessment and Plan of Treatment: Your abdominal CT scan showed:  LIVER: Subcentimeter lesion in the right hepatic lobe, too small to characterize.  RETROPERITONEUM/LYMPH NODES: Stable confluent soft tissue density in the retroperitoneum extending along the course of the aorta and iliac vessels, predominantly on the left. Stable prominent right sided retroperitoneal lymph node measuring 1.1 cm  Please follow up with Dr. Elkins and your primary care doctor - you may need more imaging and labwork to better characterize these findings on your CT Scan.        PRINCIPAL DISCHARGE DIAGNOSIS  Diagnosis: Anemia  Assessment and Plan of Treatment: Anemia  WHAT YOU NEED TO KNOW:  Anemia is a low number of red blood cells or a low amount of hemoglobin in your red blood cells. Hemoglobin is a protein that helps carry oxygen throughout your body. Red blood cells use iron to create hemoglobin. Anemia may develop if your body does not have enough iron. It may also develop if your body does not make enough red blood cells or they die faster than your body can make them.  Prevent anemia: Eat healthy foods rich in iron and vitamin C. Nuts, meat, dark leafy green vegetables, and beans are high in iron and protein. Vitamin C helps your body absorb iron. Foods rich in vitamin C include oranges and other citrus fruits. Ask your healthcare provider for a list of other foods that are high in iron or vitamin C. Ask if you need to be on a special diet.  DISCHARGE INSTRUCTIONS:  Call 911 or have someone call 911 for any of the following:  You lose consciousness.  You have severe chest pain.  Seek care immediately if:  You have dark or bloody bowel movements.  Contact your healthcare provider if:  Your symptoms are worse, even after treatment.  You have questions or concerns about your condition or care.  You were seen by a gastroenterologist and a hematologist/oncologist.   Please follow up with Dr. Elkins in a week for further workup and evaluation.  Your EGD/Colonoscopy showed:      SECONDARY DISCHARGE DIAGNOSES  Diagnosis: Abnormal abdominal CT scan  Assessment and Plan of Treatment: Your abdominal CT scan showed:  LIVER: Subcentimeter lesion in the right hepatic lobe, too small to characterize.  RETROPERITONEUM/LYMPH NODES: Stable confluent soft tissue density in the retroperitoneum extending along the course of the aorta and iliac vessels, predominantly on the left. Stable prominent right sided retroperitoneal lymph node measuring 1.1 cm  Please follow up with Dr. Elkins and your primary care doctor - you may need more imaging and labwork to better characterize these findings on your CT Scan.       Diagnosis: Hyperbilirubinemia  Assessment and Plan of Treatment: Your bilirubin was elevated.  You had a RUQ ultrasound that showed:  Your CT Scan of your liver showed a lesion in the right hepatic lobe.     PRINCIPAL DISCHARGE DIAGNOSIS  Diagnosis: Anemia  Assessment and Plan of Treatment: Anemia  WHAT YOU NEED TO KNOW:  Anemia is a low number of red blood cells or a low amount of hemoglobin in your red blood cells. Hemoglobin is a protein that helps carry oxygen throughout your body. Red blood cells use iron to create hemoglobin. Anemia may develop if your body does not have enough iron. It may also develop if your body does not make enough red blood cells or they die faster than your body can make them.  Prevent anemia: Eat healthy foods rich in iron and vitamin C. Nuts, meat, dark leafy green vegetables, and beans are high in iron and protein. Vitamin C helps your body absorb iron. Foods rich in vitamin C include oranges and other citrus fruits. Ask your healthcare provider for a list of other foods that are high in iron or vitamin C. Ask if you need to be on a special diet.  DISCHARGE INSTRUCTIONS:  Call 911 or have someone call 911 for any of the following:  You lose consciousness.  You have severe chest pain.  Seek care immediately if:  You have dark or bloody bowel movements.  Contact your healthcare provider if:  Your symptoms are worse, even after treatment.  You have questions or concerns about your condition or care.  You were seen by a gastroenterologist and a hematologist/oncologist.   Please follow up with Dr. Elkins in a week for further workup and evaluation.  Your EGD/Colonoscopy showed:  diverticulosis, internal hemorrhoids, gastritis, esophagitis, gastric ulcer and hiatal hernia with biopsy      SECONDARY DISCHARGE DIAGNOSES  Diagnosis: Abnormal abdominal CT scan  Assessment and Plan of Treatment: Your abdominal CT scan showed:  LIVER: Subcentimeter lesion in the right hepatic lobe, too small to characterize.  RETROPERITONEUM/LYMPH NODES: Stable confluent soft tissue density in the retroperitoneum extending along the course of the aorta and iliac vessels, predominantly on the left. Stable prominent right sided retroperitoneal lymph node measuring 1.1 cm  Please follow up with Dr. Flores and your primary care doctor - you may need more imaging and labwork to better characterize these findings on your CT Scan.       Diagnosis: Hyperbilirubinemia  Assessment and Plan of Treatment: Your bilirubin was elevated.  You had a RUQ ultrasound that showed:  Your CT Scan of your liver showed a lesion in the right hepatic lobe.     PRINCIPAL DISCHARGE DIAGNOSIS  Diagnosis: Anemia  Assessment and Plan of Treatment: Anemia  WHAT YOU NEED TO KNOW:  Anemia is a low number of red blood cells or a low amount of hemoglobin in your red blood cells. Hemoglobin is a protein that helps carry oxygen throughout your body. Red blood cells use iron to create hemoglobin. Anemia may develop if your body does not have enough iron. It may also develop if your body does not make enough red blood cells or they die faster than your body can make them.  Prevent anemia: Eat healthy foods rich in iron and vitamin C. Nuts, meat, dark leafy green vegetables, and beans are high in iron and protein. Vitamin C helps your body absorb iron. Foods rich in vitamin C include oranges and other citrus fruits. Ask your healthcare provider for a list of other foods that are high in iron or vitamin C. Ask if you need to be on a special diet.  DISCHARGE INSTRUCTIONS:  Call 911 or have someone call 911 for any of the following:  You lose consciousness.  You have severe chest pain.  Seek care immediately if:  You have dark or bloody bowel movements.  Contact your healthcare provider if:  Your symptoms are worse, even after treatment.  You have questions or concerns about your condition or care.  You were seen by a gastroenterologist and a hematologist/oncologist.   Please follow up with Dr. Elkins in a week for further workup and evaluation.  Your EGD/Colonoscopy showed:  diverticulosis, internal hemorrhoids, gastritis, esophagitis, gastric ulcer and hiatal hernia with biopsy      SECONDARY DISCHARGE DIAGNOSES  Diagnosis: Abnormal abdominal CT scan  Assessment and Plan of Treatment: Your abdominal CT scan showed:  LIVER: Subcentimeter lesion in the right hepatic lobe, too small to characterize.  RETROPERITONEUM/LYMPH NODES: Stable confluent soft tissue density in the retroperitoneum extending along the course of the aorta and iliac vessels, predominantly on the left. Stable prominent right sided retroperitoneal lymph node measuring 1.1 cm  Please follow up with Dr. Flores and your primary care doctor - you may need more imaging and labwork to better characterize these findings on your CT Scan.       Diagnosis: Hyperbilirubinemia  Assessment and Plan of Treatment: Your bilirubin was elevated.  You had a RUQ ultrasound that showed: no acute findings  Your CT Scan of your liver showed a lesion in the right hepatic lobe.

## 2021-06-14 NOTE — PROGRESS NOTE ADULT - SUBJECTIVE AND OBJECTIVE BOX
Esophagogastroduodenoscopy Report  Indication: GI bleeding  Referring MD:   Jaleel Bucio MD  Instrument:  # [id number]  Anesthesia: MAC  Consent:  Informed consent was obtained from the patient after providing any opportunity for questions  Procedure: The gastroscope was gently passed through the incisoral orifice into the oral cavity and under direct visualization the esophagus was intubated. The endoscope was passed down the esophagus, through the stomach and into proximal jejunum. Color, texture, mucosa and anatomy of the esophagus, stomach, and duodenum were carefully examined with the scope. The patient tolerated the procedure well. After completion of the examination, the patient was transferred to the recovery room.   Preparation: NPO   Findings:   Oropharynx	Normal  Esophagus	Mild distal mucosal erythema (Bxed)  EG-junction	Hiatal hernia  Cardia	Normal.  Body	Normal   Antrum	Patchy mucosal erythema with multiple 4-6mm ulcers (Bxed)  Pylorus	Normal.  Duodenal Bulb	Normal   2nd portion	Normal  3rd portion	Normal.     EBL:0    Impression:   1. Gastric ulcers  2. Gastritis  3. Esophagitis  4. Hiatal Hernia     Plan:  1. Follow up path  2. Protonix 40mg daily  3. Avoid NSAID  4. Anti reflux measure  5. Treat for H. Pylori if positive        Procedure Start Time: 15:34  Procedure End Time:   15:37        Attending:     Jaleel Bucio M.D.

## 2021-06-15 ENCOUNTER — TRANSCRIPTION ENCOUNTER (OUTPATIENT)
Age: 76
End: 2021-06-15

## 2021-06-15 VITALS
HEART RATE: 76 BPM | OXYGEN SATURATION: 100 % | RESPIRATION RATE: 20 BRPM | DIASTOLIC BLOOD PRESSURE: 64 MMHG | SYSTOLIC BLOOD PRESSURE: 96 MMHG | TEMPERATURE: 98 F

## 2021-06-15 LAB
ALBUMIN SERPL ELPH-MCNC: 3.9 G/DL — SIGNIFICANT CHANGE UP (ref 3.5–5)
ALP SERPL-CCNC: 76 U/L — SIGNIFICANT CHANGE UP (ref 40–120)
ALT FLD-CCNC: 17 U/L DA — SIGNIFICANT CHANGE UP (ref 10–60)
ANION GAP SERPL CALC-SCNC: 9 MMOL/L — SIGNIFICANT CHANGE UP (ref 5–17)
AST SERPL-CCNC: 13 U/L — SIGNIFICANT CHANGE UP (ref 10–40)
BILIRUB SERPL-MCNC: 2.3 MG/DL — HIGH (ref 0.2–1.2)
BUN SERPL-MCNC: 19 MG/DL — HIGH (ref 7–18)
CALCIUM SERPL-MCNC: 8.6 MG/DL — SIGNIFICANT CHANGE UP (ref 8.4–10.5)
CHLORIDE SERPL-SCNC: 102 MMOL/L — SIGNIFICANT CHANGE UP (ref 96–108)
CO2 SERPL-SCNC: 23 MMOL/L — SIGNIFICANT CHANGE UP (ref 22–31)
CREAT SERPL-MCNC: 0.91 MG/DL — SIGNIFICANT CHANGE UP (ref 0.5–1.3)
FOLATE SERPL-MCNC: 13.6 NG/ML — SIGNIFICANT CHANGE UP
GLUCOSE SERPL-MCNC: 94 MG/DL — SIGNIFICANT CHANGE UP (ref 70–99)
HCT VFR BLD CALC: 30.3 % — LOW (ref 39–50)
HGB BLD-MCNC: 9.9 G/DL — LOW (ref 13–17)
INTERPRETATION SERPL IFE-IMP: SIGNIFICANT CHANGE UP
KAPPA LC SER QL IFE: 1.57 MG/DL — SIGNIFICANT CHANGE UP (ref 0.33–1.94)
KAPPA/LAMBDA FREE LIGHT CHAIN RATIO, SERUM: 0.12 RATIO — LOW (ref 0.26–1.65)
LAMBDA LC SER QL IFE: 13.11 MG/DL — HIGH (ref 0.57–2.63)
MCHC RBC-ENTMCNC: 27.3 PG — SIGNIFICANT CHANGE UP (ref 27–34)
MCHC RBC-ENTMCNC: 32.7 GM/DL — SIGNIFICANT CHANGE UP (ref 32–36)
MCV RBC AUTO: 83.5 FL — SIGNIFICANT CHANGE UP (ref 80–100)
NRBC # BLD: 0 /100 WBCS — SIGNIFICANT CHANGE UP (ref 0–0)
PLATELET # BLD AUTO: 156 K/UL — SIGNIFICANT CHANGE UP (ref 150–400)
POTASSIUM SERPL-MCNC: 4 MMOL/L — SIGNIFICANT CHANGE UP (ref 3.5–5.3)
POTASSIUM SERPL-SCNC: 4 MMOL/L — SIGNIFICANT CHANGE UP (ref 3.5–5.3)
PROT SERPL-MCNC: 7 G/DL — SIGNIFICANT CHANGE UP (ref 6–8.3)
RBC # BLD: 3.63 M/UL — LOW (ref 4.2–5.8)
RBC # FLD: 19.9 % — HIGH (ref 10.3–14.5)
SODIUM SERPL-SCNC: 134 MMOL/L — LOW (ref 135–145)
VIT B12 SERPL-MCNC: 580 PG/ML — SIGNIFICANT CHANGE UP (ref 232–1245)
WBC # BLD: 5.14 K/UL — SIGNIFICANT CHANGE UP (ref 3.8–10.5)
WBC # FLD AUTO: 5.14 K/UL — SIGNIFICANT CHANGE UP (ref 3.8–10.5)

## 2021-06-15 PROCEDURE — 86334 IMMUNOFIX E-PHORESIS SERUM: CPT

## 2021-06-15 PROCEDURE — 82607 VITAMIN B-12: CPT

## 2021-06-15 PROCEDURE — 86769 SARS-COV-2 COVID-19 ANTIBODY: CPT

## 2021-06-15 PROCEDURE — 84443 ASSAY THYROID STIM HORMONE: CPT

## 2021-06-15 PROCEDURE — 87635 SARS-COV-2 COVID-19 AMP PRB: CPT

## 2021-06-15 PROCEDURE — 82247 BILIRUBIN TOTAL: CPT

## 2021-06-15 PROCEDURE — 81001 URINALYSIS AUTO W/SCOPE: CPT

## 2021-06-15 PROCEDURE — 80061 LIPID PANEL: CPT

## 2021-06-15 PROCEDURE — 83010 ASSAY OF HAPTOGLOBIN QUANT: CPT

## 2021-06-15 PROCEDURE — 84100 ASSAY OF PHOSPHORUS: CPT

## 2021-06-15 PROCEDURE — 80053 COMPREHEN METABOLIC PANEL: CPT

## 2021-06-15 PROCEDURE — 80048 BASIC METABOLIC PNL TOTAL CA: CPT

## 2021-06-15 PROCEDURE — 82977 ASSAY OF GGT: CPT

## 2021-06-15 PROCEDURE — 85610 PROTHROMBIN TIME: CPT

## 2021-06-15 PROCEDURE — 88305 TISSUE EXAM BY PATHOLOGIST: CPT

## 2021-06-15 PROCEDURE — 85027 COMPLETE CBC AUTOMATED: CPT

## 2021-06-15 PROCEDURE — 82746 ASSAY OF FOLIC ACID SERUM: CPT

## 2021-06-15 PROCEDURE — 88312 SPECIAL STAINS GROUP 1: CPT

## 2021-06-15 PROCEDURE — 76705 ECHO EXAM OF ABDOMEN: CPT

## 2021-06-15 PROCEDURE — 71045 X-RAY EXAM CHEST 1 VIEW: CPT

## 2021-06-15 PROCEDURE — 82232 ASSAY OF BETA-2 PROTEIN: CPT

## 2021-06-15 PROCEDURE — 83935 ASSAY OF URINE OSMOLALITY: CPT

## 2021-06-15 PROCEDURE — 74174 CTA ABD&PLVS W/CONTRAST: CPT

## 2021-06-15 PROCEDURE — 84300 ASSAY OF URINE SODIUM: CPT

## 2021-06-15 PROCEDURE — 83690 ASSAY OF LIPASE: CPT

## 2021-06-15 PROCEDURE — 83540 ASSAY OF IRON: CPT

## 2021-06-15 PROCEDURE — 86923 COMPATIBILITY TEST ELECTRIC: CPT

## 2021-06-15 PROCEDURE — P9040: CPT

## 2021-06-15 PROCEDURE — 83550 IRON BINDING TEST: CPT

## 2021-06-15 PROCEDURE — 82248 BILIRUBIN DIRECT: CPT

## 2021-06-15 PROCEDURE — 84133 ASSAY OF URINE POTASSIUM: CPT

## 2021-06-15 PROCEDURE — 96374 THER/PROPH/DIAG INJ IV PUSH: CPT

## 2021-06-15 PROCEDURE — 85045 AUTOMATED RETICULOCYTE COUNT: CPT

## 2021-06-15 PROCEDURE — 36430 TRANSFUSION BLD/BLD COMPNT: CPT

## 2021-06-15 PROCEDURE — 82570 ASSAY OF URINE CREATININE: CPT

## 2021-06-15 PROCEDURE — 82962 GLUCOSE BLOOD TEST: CPT

## 2021-06-15 PROCEDURE — 85730 THROMBOPLASTIN TIME PARTIAL: CPT

## 2021-06-15 PROCEDURE — 86850 RBC ANTIBODY SCREEN: CPT

## 2021-06-15 PROCEDURE — 76705 ECHO EXAM OF ABDOMEN: CPT | Mod: 26,RT

## 2021-06-15 PROCEDURE — 36415 COLL VENOUS BLD VENIPUNCTURE: CPT

## 2021-06-15 PROCEDURE — 93005 ELECTROCARDIOGRAM TRACING: CPT

## 2021-06-15 PROCEDURE — 83521 IG LIGHT CHAINS FREE EACH: CPT

## 2021-06-15 PROCEDURE — 86901 BLOOD TYPING SEROLOGIC RH(D): CPT

## 2021-06-15 PROCEDURE — 86900 BLOOD TYPING SEROLOGIC ABO: CPT

## 2021-06-15 PROCEDURE — C1889: CPT

## 2021-06-15 PROCEDURE — 83735 ASSAY OF MAGNESIUM: CPT

## 2021-06-15 PROCEDURE — 83615 LACTATE (LD) (LDH) ENZYME: CPT

## 2021-06-15 PROCEDURE — 84466 ASSAY OF TRANSFERRIN: CPT

## 2021-06-15 PROCEDURE — 82728 ASSAY OF FERRITIN: CPT

## 2021-06-15 PROCEDURE — 99285 EMERGENCY DEPT VISIT HI MDM: CPT | Mod: 25

## 2021-06-15 PROCEDURE — 83036 HEMOGLOBIN GLYCOSYLATED A1C: CPT

## 2021-06-15 PROCEDURE — 84484 ASSAY OF TROPONIN QUANT: CPT

## 2021-06-15 PROCEDURE — 85025 COMPLETE CBC W/AUTO DIFF WBC: CPT

## 2021-06-15 PROCEDURE — 82272 OCCULT BLD FECES 1-3 TESTS: CPT

## 2021-06-15 RX ADMIN — Medication 1 DROP(S): at 05:40

## 2021-06-15 RX ADMIN — PANTOPRAZOLE SODIUM 40 MILLIGRAM(S): 20 TABLET, DELAYED RELEASE ORAL at 05:40

## 2021-06-15 RX ADMIN — FINASTERIDE 5 MILLIGRAM(S): 5 TABLET, FILM COATED ORAL at 11:11

## 2021-06-15 NOTE — PROGRESS NOTE ADULT - NEGATIVE GENERAL SYMPTOMS
no fever/no chills/no sweating/no polyuria/no polydipsia/no malaise

## 2021-06-15 NOTE — PROGRESS NOTE ADULT - NSICDXPILOT_GEN_ALL_CORE
[place]

## 2021-06-15 NOTE — PROGRESS NOTE ADULT - NEGATIVE CARDIOVASCULAR SYMPTOMS
no chest pain/no palpitations/no dyspnea on exertion/no orthopnea

## 2021-06-15 NOTE — PROGRESS NOTE ADULT - SUBJECTIVE AND OBJECTIVE BOX
doing stable  GI w/u neg for bleeding    Vital Signs Last 24 Hrs  T(C): 36.4 (10 Albert 2021 20:24), Max: 36.4 (10 Albert 2021 14:13)  T(F): 97.5 (10 Albert 2021 20:24), Max: 97.6 (10 Albert 2021 14:13)  HR: 68 (10 Albert 2021 20:24) (68 - 85)  BP: 107/53 (10 Albert 2021 20:24) (107/53 - 113/56)  RR: 17 (10 Albert 2021 20:24) (16 - 17)  SpO2: 100% (10 Albert 2021 20:24) (99% - 100%) (10 Albert 2021 17:29)     Pt is seen and examined  pt is awake and lying in bed/out of bed to chair  pt seems comfortable and denies any complaints at this time    ROS:  Negative except for:    MEDICATIONS  (STANDING):  artificial  tears Solution 1 Drop(s) Both EYES two times a day  finasteride 5 milliGRAM(s) Oral daily  ketorolac 0.5% Ophthalmic Solution 1 Drop(s) Both EYES two times a day  pantoprazole    Tablet 40 milliGRAM(s) Oral before breakfast  tamsulosin 0.4 milliGRAM(s) Oral at bedtime    MEDICATIONS  (PRN):      Allergies    No Known Allergies    Intolerances        Vital Signs Last 24 Hrs  T(C): 36.8 (15 Albert 2021 05:07), Max: 36.8 (15 Albert 2021 05:07)  T(F): 98.2 (15 Albert 2021 05:07), Max: 98.2 (15 Albert 2021 05:07)  HR: 72 (15 Albert 2021 05:07) (71 - 72)  BP: 164/75 (15 Albert 2021 05:07) (114/65 - 164/75)  BP(mean): --  RR: 18 (15 Albert 2021 05:07) (17 - 18)  SpO2: 100% (15 Albert 2021 05:07) (100% - 100%)    PHYSICAL EXAM  General: adult in NAD  HEENT: clear oropharynx, anicteric sclera, pink conjunctiva  Neck: supple  CV: normal S1/S2 with no murmur rubs or gallops  Lungs: positive air movement b/l ant lungs,clear to auscultation, no wheezes, no rales  Abdomen: soft non-tender non-distended, no hepatosplenomegaly  Ext: no clubbing cyanosis or edema  Skin: no rashes and no petechiae  Neuro: alert and oriented X 4, no focal deficits  LABS:                          9.9    5.14  )-----------( 156      ( 15 Albert 2021 07:50 )             30.3         Mean Cell Volume : 83.5 fl  Mean Cell Hemoglobin : 27.3 pg  Mean Cell Hemoglobin Concentration : 32.7 gm/dL  Auto Neutrophil # : x  Auto Lymphocyte # : x  Auto Monocyte # : x  Auto Eosinophil # : x  Auto Basophil # : x  Auto Neutrophil % : x  Auto Lymphocyte % : x  Auto Monocyte % : x  Auto Eosinophil % : x  Auto Basophil % : x    Serial CBC  Hematocrit 30.3  Hemoglobin 9.9  Plat 156  RBC 3.63  WBC 5.14  Serial CBC  Hematocrit --  Hemoglobin --  Plat --  RBC 3.75  WBC --  Serial CBC  Hematocrit 29.5  Hemoglobin 9.8  Plat 172  RBC 3.59  WBC 5.52  Serial CBC  Hematocrit 29.2  Hemoglobin 9.7  Plat 180  RBC 3.57  WBC 5.13  Serial CBC  Hematocrit 28.7  Hemoglobin 9.4  Plat 180  RBC 3.49  WBC 5.59    06-15    134<L>  |  102  |  19<H>  ----------------------------<  94  4.0   |  23  |  0.91    Ca    8.6      15 Albert 2021 07:50  Phos  2.8     06-14  Mg     2.1     06-14    TPro  7.0  /  Alb  3.9  /  TBili  2.3<H>  /  DBili  x   /  AST  13  /  ALT  17  /  AlkPhos  76  06-15          Reticulocyte Percent: 2.6 % (06-14 @ 10:36)  Ferritin, Serum: 254 ng/mL (06-11 @ 12:08)  Iron - Total Binding Capacity.: 188 ug/dL (06-11 @ 07:15)  Iron - Total Binding Capacity.: 174 ug/dL (06-10 @ 21:30)            BLOOD SMEAR INTERPRETATION:       RADIOLOGY & ADDITIONAL STUDIES:

## 2021-06-15 NOTE — PROGRESS NOTE ADULT - RS GEN PE MLT RESP DETAILS PC
airway patent/breath sounds equal/good air movement/respirations non-labored/no rales/no rhonchi
airway patent/breath sounds equal/good air movement/no rales/no rhonchi/no wheezes
airway patent/breath sounds equal/good air movement/respirations non-labored/no rales/no rhonchi/no wheezes

## 2021-06-15 NOTE — PROGRESS NOTE ADULT - NEGATIVE RESPIRATORY AND THORAX SYMPTOMS
no wheezing/no dyspnea/no cough/no hemoptysis

## 2021-06-15 NOTE — PROGRESS NOTE ADULT - PROVIDER SPECIALTY LIST ADULT
Gastroenterology
Gastroenterology
Internal Medicine
Nephrology
Internal Medicine
Internal Medicine
Heme/Onc
Internal Medicine
Internal Medicine
Gastroenterology
Internal Medicine
Internal Medicine
Gastroenterology
Gastroenterology

## 2021-06-15 NOTE — PROGRESS NOTE ADULT - REASON FOR ADMISSION
GI bleed
EGD with biopsy
GI bleed
Colonoscopy with biopsy
GI bleed

## 2021-06-15 NOTE — PROGRESS NOTE ADULT - SUBJECTIVE AND OBJECTIVE BOX
Patient is a 76y old  Male who presents with a chief complaint of GI bleed (14 Jun 2021 16:39)    pt seen in icu [  ], reg med floor [ x  ], bed [ x ], chair at bedside [   ], a+o x3 [x ], lethargic [  ],    nad [ x ]      Allergies    No Known Allergies        Vitals    T(F): 98.2 (06-15-21 @ 05:07), Max: 98.2 (06-15-21 @ 05:07)  HR: 72 (06-15-21 @ 05:07) (71 - 72)  BP: 164/75 (06-15-21 @ 05:07) (114/65 - 164/75)  RR: 18 (06-15-21 @ 05:07) (17 - 18)  SpO2: 100% (06-15-21 @ 05:07) (100% - 100%)  Wt(kg): --  CAPILLARY BLOOD GLUCOSE          Labs                          9.8    5.52  )-----------( 172      ( 14 Jun 2021 07:38 )             29.5       06-14    136  |  101  |  21<H>  ----------------------------<  95  4.2   |  23  |  0.94    Ca    8.5      14 Jun 2021 07:38  Phos  2.8     06-14  Mg     2.1     06-14    TPro  x   /  Alb  x   /  TBili  2.6<H>  /  DBili  0.4<H>  /  AST  x   /  ALT  x   /  AlkPhos  x   06-14                Radiology Results      Meds    MEDICATIONS  (STANDING):  artificial  tears Solution 1 Drop(s) Both EYES two times a day  finasteride 5 milliGRAM(s) Oral daily  ketorolac 0.5% Ophthalmic Solution 1 Drop(s) Both EYES two times a day  pantoprazole    Tablet 40 milliGRAM(s) Oral before breakfast  tamsulosin 0.4 milliGRAM(s) Oral at bedtime      MEDICATIONS  (PRN):      Physical Exam    Neuro :  no focal deficits  Respiratory: CTA B/L  CV: RRR, S1S2, no murmurs,   Abdominal: Soft, NT, ND +BS,  Extremities: No edema, + peripheral pulses        ASSESSMENT    Anemia   r/o gi bleed,   hyponatremia,   h/o HTN (hypertension)  BPH (benign prostatic hyperplasia)  Abdominal hernia  Bilateral inguinal hernia        PLAN    s/p transfusion 2 prbc   cont protonix,   gi f/u   f/u egd, colonoscopy  stool occult blood x3,   pt with retroperitoneal lesion on ct  heme onc cons  Hyponatremia , due to HCTZ and possible increased water intake  renal f/u  No changes in MS  hydralazine held 2nd to hyponatremia   serum sodium improving   cont current meds       Patient is a 76y old  Male who presents with a chief complaint of GI bleed (14 Jun 2021 16:39)    pt seen in icu [  ], reg med floor [ x  ], bed [ x ], chair at bedside [   ], a+o x3 [x ], lethargic [  ],    nad [ x ]      Allergies    No Known Allergies        Vitals    T(F): 98.2 (06-15-21 @ 05:07), Max: 98.2 (06-15-21 @ 05:07)  HR: 72 (06-15-21 @ 05:07) (71 - 72)  BP: 164/75 (06-15-21 @ 05:07) (114/65 - 164/75)  RR: 18 (06-15-21 @ 05:07) (17 - 18)  SpO2: 100% (06-15-21 @ 05:07) (100% - 100%)  Wt(kg): --  CAPILLARY BLOOD GLUCOSE          Labs                          9.8    5.52  )-----------( 172      ( 14 Jun 2021 07:38 )             29.5       06-14    136  |  101  |  21<H>  ----------------------------<  95  4.2   |  23  |  0.94    Ca    8.5      14 Jun 2021 07:38  Phos  2.8     06-14  Mg     2.1     06-14    TPro  x   /  Alb  x   /  TBili  2.6<H>  /  DBili  0.4<H>  /  AST  x   /  ALT  x   /  AlkPhos  x   06-14    Beta-2-Microglobulin, Serum (06.14.21 @ 19:02)   Beta-2-Microglobulin, Serum: 3.9 mg/L    Haptoglobin, Serum (06.14.21 @ 19:02)   Haptoglobin, Serum: 32 mg/dL           Radiology Results      Meds    MEDICATIONS  (STANDING):  artificial  tears Solution 1 Drop(s) Both EYES two times a day  finasteride 5 milliGRAM(s) Oral daily  ketorolac 0.5% Ophthalmic Solution 1 Drop(s) Both EYES two times a day  pantoprazole    Tablet 40 milliGRAM(s) Oral before breakfast  tamsulosin 0.4 milliGRAM(s) Oral at bedtime      MEDICATIONS  (PRN):      Physical Exam    Neuro :  no focal deficits  Respiratory: CTA B/L  CV: RRR, S1S2, no murmurs,   Abdominal: Soft, NT, ND +BS,  Extremities: No edema, + peripheral pulses        ASSESSMENT    Anemia   gi bleed poss 2nd to gastric ulcer,   hyponatremia,   h/o HTN (hypertension)  BPH (benign prostatic hyperplasia)  Abdominal hernia  Bilateral inguinal hernia        PLAN    s/p transfusion 2 prbc   cont protonix,   gi f/u   egd with Gastric ulcers, Gastritis, Esophagitis, Hiatal Hernia  Follow up path  Avoid NSAID  Anti reflux measure  Treat for H. Pylori if positive  colonoscopy with Diverticulosis, Poor prep. Internal hemorrhoids  Follow up path  High fiber diet  Sitz bath  pt with retroperitoneal lesion on ct  heme onc f/u   will r/o hemolysis, myeloma  retroperitoneal mass ?fibrosis, lymphoma, sarcoma  will need outpt PET scan   pt to f/u with Dr Flores outpt for further w/u and mgmt   Hyponatremia , due to HCTZ and possible increased water intake  renal f/u  No changes in MS  hydralazine held 2nd to hyponatremia   serum sodium wnl  cont current meds   pt stable for d/c

## 2021-06-15 NOTE — PROGRESS NOTE ADULT - GASTROINTESTINAL DETAILS
soft/nontender/no distention/no masses palpable/bowel sounds normal/no bruit/no rebound tenderness/no guarding/no rigidity
soft/nontender/no distention/no masses palpable/bowel sounds normal/no bruit/no rebound tenderness/no guarding/no rigidity
soft/nontender/no distention/no masses palpable/bowel sounds normal/no rebound tenderness/no guarding/no rigidity

## 2021-06-15 NOTE — PROGRESS NOTE ADULT - NEGATIVE ENMT SYMPTOMS
no hearing difficulty/no ear pain/no tinnitus/no vertigo/no nose bleeds/no gum bleeding/no dry mouth/no throat pain/no dysphagia

## 2021-06-15 NOTE — PROGRESS NOTE ADULT - SUBJECTIVE AND OBJECTIVE BOX
[   ] ICU            [   ] CCU            [  X ] Medical Floor (patient seen and examined earlier in the morning)      Patient is a 76 year old male with GI bleeding . GI consulted to evaluate.         76 year old male with past medical history significant for BPH, Hypertension , hard of hearing, presents to ED with low Hb. Patient reports that he woke up in the morning with upper abdominal pain and bloating. He also reports1 episode of dark BM, and reports getting weak and dizzy while he was climbing up the stairs.   Patient denies hematemesis, hematochezia, fever, chills, chest pain, SOB, cough, hematuria, epistaxis, hemoptysis, dysuria or diarrhea.         Today patient appears comfortable. No new complaints reported, No abdominal pain, N/V, hematemesis, hematochezia, melena, fever, chills, chest pain, SOB, cough or diarrhea reported.       PAIN MANAGEMENT:  Pain Scale:                 0/10  Pain Location:      Prior Colonoscopy:  No prior colonoscopy    PAST MEDICAL HISTORY  HTN   BPH         PAST SURGICAL HISTORY   Bilateral inguinal hernia repair        Allergies    No Known Allergies    Intolerances  None       MEDICATIONS  (STANDING):  artificial  tears Solution 1 Drop(s) Both EYES two times a day  dextrose 5%. 1000 milliLiter(s) (80 mL/Hr) IV Continuous <Continuous>  finasteride 5 milliGRAM(s) Oral daily  ketorolac 0.5% Ophthalmic Solution 1 Drop(s) Both EYES two times a day  pantoprazole  Injectable 40 milliGRAM(s) IV Push every 12 hours  tamsulosin 0.4 milliGRAM(s) Oral at bedtime         SOCIAL HISTORY  Advanced Directives:       [ X ] Full Code       [  ] DNR  Marital Status:         [  ] M      [ X ] S      [  ] D       [  ] W  Children:       [ X ] Yes      [  ] No  Occupation:        [  ] Employed       [ X ] Unemployed       [  ] Retired  Diet:       [ X ] Regular       [  ] PEG feeding          [  ] NG tube feeding  Drug Use:           [ X ] Patient denied          [  ] Yes  Alcohol:           [ X ] No             [  ] Yes (socially)         [  ] Yes (chronic)  Tobacco:           [  ] Yes           [ X ] No      FAMILY HISTORY  [ X ] Heart Disease            [ X ] Diabetes             [ X ] HTN             [  ] Colon Cancer             [  ] Stomach Cancer              [  ] Pancreatic Cancer        VITALS  Vital Signs Last 24 Hrs  T(C): 36.5 (15 Albert 2021 13:51), Max: 36.8 (15 Albert 2021 05:07)  T(F): 97.7 (15 Albert 2021 13:51), Max: 98.2 (15 Albert 2021 05:07)  HR: 76 (15 Albert 2021 13:51) (71 - 76)  BP: 96/64 (15 Albert 2021 13:51) (96/64 - 164/75)   RR: 20 (15 Albert 2021 13:51) (17 - 20)  SpO2: 100% (15 Albert 2021 13:51) (100% - 100%)       MEDICATIONS  (STANDING):  artificial  tears Solution 1 Drop(s) Both EYES two times a day  finasteride 5 milliGRAM(s) Oral daily  ketorolac 0.5% Ophthalmic Solution 1 Drop(s) Both EYES two times a day  pantoprazole    Tablet 40 milliGRAM(s) Oral before breakfast  tamsulosin 0.4 milliGRAM(s) Oral at bedtime                             9.9    5.14  )-----------( 156      ( 15 Albert 2021 07:50 )             30.3       06-15    134<L>  |  102  |  19<H>  ----------------------------<  94  4.0   |  23  |  0.91    Ca    8.6      15 Albert 2021 07:50  Phos  2.8     06-14  Mg     2.1     06-14    TPro  7.0  /  Alb  3.9  /  TBili  2.3<H>  /  DBili  x   /  AST  13  /  ALT  17  /  AlkPhos  76  06-15

## 2021-06-15 NOTE — DISCHARGE NOTE NURSING/CASE MANAGEMENT/SOCIAL WORK - PATIENT PORTAL LINK FT
You can access the FollowMyHealth Patient Portal offered by [facility] by registering at the following website: [email or web address]. By joining "[employer]’s FollowMyHealth portal, you will also be able to view your health information using other applications (apps) compatible with our system.

## 2021-06-15 NOTE — PROGRESS NOTE ADULT - NEGATIVE SKIN SYMPTOMS
no rash/no itching/no dryness/no brittle nails/no pitted nails/no hair loss

## 2021-06-15 NOTE — PROGRESS NOTE ADULT - ASSESSMENT
1. Anemia  2. No evidence of acute GI bleeding  3. R/o chronic GI bleeding  4. R/o Peptic ulcer disease  5. R/o colorectal neoplasm  6. R/o AVM's    Suggestions:    1. Monitor H/H  2. Transfuse PRBC as needed  3. Protonix 40mg daily  4. Avoid NSAID  5. EGD and colonoscopy  6. DVT prophylaxis
· Assessment	  76 year old male with anemia had a dark stool and worsening anemia.    1. anemia,  ferritin, B12, folate normal  ?GIB  for colonoscopy  but he can have other causes of anemia  will r/o hemolysis, myeloma  elevated indirect bili, ?hemolysis or gilbert  LDH normal, Retic normal, hapto slightly low  it can be MDS although hemolysis is not completely ruled out  to follow up with Dr. nguyễn     2. retroperitoneal mass  ?fibrosis, lymphoma, sarcoma  will need outpt PET scan
Hyponatremia due to distal tubule diuretics and possible hypovolemia due to GI bleed with increased ADH  Today sodium 130.    Will repeat UA , urine sodium and osmolality.  BP stable will add salt tablets. Follow sodium in pm.      
1. The etiology for anemia in this patient is multifactorial  2. No evidence of acute GI bleeding  3. R/o chronic GI bleeding  4. R/o Peptic ulcer disease  5. R/o colorectal neoplasm  6. R/o AVM's    Suggestions:    1. Monitor H/H  2. Transfuse PRBC as needed  3. Protonix 40mg daily  4. Avoid NSAID  5. EGD and colonoscopy  6. DVT prophylaxis
76 year old [place] male with past medical history of BPH and HTN who presented to the ED with c/o abdominal pain, bloating, weakness and dizziness. He was noted to be anemic with Hemoglobin at 6.6 for which he received 2 units of PRBC's and GI was consulted. Nephrology onboard for hyponatremia and heme/onc following for RP mass noted on CT A/P and anemia. Patient is now s/p EGD/colonoscopy with results showing internal hemorrhoids, diverticulosis, and gastric ulcers. Patient also noted to have persistent hyperbilirubinemia on labs, RUQ ultrasound ordered. 
1. Anemia  2. No evidence of acute GI bleeding  3.  S/p EGD and colonoscopy  4. Gastric ulcer  5. Diverticulosis    Suggestions:    1. Monitor H/H  2. Transfuse PRBC as needed  3. Protonix 40mg daily  4. Avoid NSAID  5. Follow up path  6. DVT prophylaxis

## 2021-06-15 NOTE — PROGRESS NOTE ADULT - NEGATIVE PSYCHIATRIC SYMPTOMS
no suicidal ideation/no depression/no anxiety/no insomnia/no memory loss/no paranoia/no mood swings/no agitation

## 2021-06-16 LAB — SURGICAL PATHOLOGY STUDY: SIGNIFICANT CHANGE UP

## 2021-08-12 NOTE — PROGRESS NOTE ADULT - GENERAL
Patient is scheduled to see Dr Judy Rios  2-17-22   2:30pm  [address] [facility] [address]    Patient was given this information. He states understanding and had no questions.
details…

## 2021-08-13 NOTE — ED ADULT TRIAGE NOTE - MEANS OF ARRIVAL
ambulatory
FAMILY HISTORY:  Father  Still living? Unknown  FH: HTN (hypertension), Age at diagnosis: Age Unknown    Mother  Still living? Unknown  FH: HTN (hypertension), Age at diagnosis: Age Unknown

## 2022-03-28 NOTE — CONSULT NOTE ADULT - ATTENDING COMMENTS
CESAR STUBBS MRN#: [id number]  CODE STATUS: FULL CODE     SUBJECTIVE   Chief complaint: fall    Currently admitted to medicine with the primary diagnosis of HIP FRACTURE      Today is hospital day 3d,   INTERVAL HPI/OVERNIGHT EVENTS: No acute events overnight    This morning, she is laying in bed comfortably. Denies chest pain, shortness of breath, abdominal pain, nausea, vomiting or changes in bowel habits. She has no complaints today.     Urinating and stooling appropriately.    Present Today:           [place] Catheter (x)No/ ()Yes?   Indication:             Central Line (x)No/ ()Yes?  Indication:          IV Fluids (x)No/ ()Yes?  Indication:     OBJECTIVE  PAST MEDICAL & SURGICAL HISTORY  Constipation    GERD (gastroesophageal reflux disease)    Osteoporosis    OA (osteoarthritis)      ALLERGIES:  No Known Allergies    HOME MEDICATIONS:  Home Medications:  acetaminophen 500 mg oral tablet, chewable: 2 tab(s) orally every 12 hours. MDD 3g daily (25 Mar 2022 05:21)  cyanocobalamin 1000 mcg/mL injectable solution: 1 milliliter(s) injectable every 30 days (25 Mar 2022 05:21)  docusate potassium 100 mg oral capsule: 3 cap(s) orally once a day (at bedtime) (25 Mar 2022 05:21)  Aimee-Lanta oral suspension: 10 milliliter(s) orally 4 times a day (after meals and at bedtime), As Needed (25 Mar 2022 05:21)  lactulose 10 g/15 mL oral syrup: 10 gram(s) orally 2 times a day (25 Mar 2022 05:21)  Senna 8.6 mg oral tablet: 2 tab(s) orally once a day at 9 AM (25 Mar 2022 05:21)  valsartan 80 mg oral tablet: 1 tab(s) orally once a day (25 Mar 2022 05:21)    MEDICATIONS:  STANDING MEDICATIONS  MEDICATIONS  (STANDING):  acetaminophen     Tablet .. 650 milliGRAM(s) Oral every 6 hours  enoxaparin Injectable 40 milliGRAM(s) SubCutaneous every 24 hours  lactulose Syrup 10 Gram(s) Oral every 6 hours  polyethylene glycol 3350 17 Gram(s) Oral two times a day  senna 2 Tablet(s) Oral <User Schedule>  valsartan 40 milliGRAM(s) Oral daily    PRN MEDICATIONS  MEDICATIONS  (PRN):  aluminum hydroxide/magnesium hydroxide/simethicone Suspension 30 milliLiter(s) Oral every 6 hours PRN Dyspepsia  melatonin 3 milliGRAM(s) Oral at bedtime PRN Insomnia  morphine  - Injectable 2 milliGRAM(s) IV Push every 6 hours PRN Severe Pain (7 - 10)      VITAL SIGNS  T(F): 98 (03-28 @ 14:58), Max: 99.4 (03-28 @ 05:04)  HR: 81 (03-28 @ 14:58) (81 - 86)  BP: 125/68 (03-28 @ 14:58) (104/42 - 136/64)  RR: 18 (03-28 @ 14:58) (18 - 18)  SpO2: 96% (03-28 @ 05:04) (96% - 96%)    LABS:                        8.6    15.63 )-----------( 240      ( 28 Mar 2022 05:30 )             26.0     03-28    139  |  106  |  16  ----------------------------<  105<H>  4.4   |  25  |  0.5<L>    Ca    9.0      28 Mar 2022 05:30  Mg     1.9     03-28    TPro  5.6<L>  /  Alb  3.2<L>  /  TBili  0.8  /  DBili  x   /  AST  15  /  ALT  8   /  AlkPhos  63  03-28                  RADIOLOGY:   Reviewed    PHYSICAL EXAM:  General: Comfortably laying on the hospital bed. NAD. AAOx3.  HEENT: Atraumatic. Normocephalic. EOMI. Conjunctiva and sclera clear.  Cardiac: Normal S1, S2. RRR. No murmurs, rubs, or gallops.  Pulm: CTA b/l no wheezes, rhonchi, or rales.  GI: Soft, nontender, nondistended. [id number]  Ext: 2+ pulses. Moving all 4 extremities. No edema, cyanosis.  Neuro: CN II-XII grossly intact  Skin: No lesions or rashes   Patient seen and examined, agree with resident exam, assessment, and plan.  72M presenting from home with confusion, a waxing and waning mental status, and weakness while at home.  He informs that he fell at home but did not hit his head, he lowered himself to the ground due to weakness.  On ER evaluation, patient found with hyponatremia to 115, and a urinalysis suspicious for UTI.  Upon further questioning of both the patient and his son at bedside, they informed that he has been self restricting sodium intake and drinking water in an effort to pursue a healthier lifestyle; furthermore, the son was informed by other physicians that the patient had a low sodium prior to this hospitalization.  On exam, patient with dry oral membranes, and labwork with urinalysis and urine lytes indicative of hypovolemic hyponatremia, along with poor solute intake.  Initially placed on 3% hypertonic saline, fluids were changed to normal saline instead due to a rise from 115 to 119.  Would continue to monitor serum chemistries every 6 hours to ensure that serum sodiums do not exceed a 10 mEq increase over 24 hours due to chronicity.  Currently on Zosyn for antimicrobial coverage- would de-escalate after following up on culture results.  As patient is clinically improved with his mental status, he is hemodynamically stable, and his serum sodiums are improving without the need for hypertonic saline, there is no urgent need for MICU at this time. Patient seen and examined, agree with resident exam, assessment, and plan.  72M presenting from home with confusion, a waxing and waning mental status, and weakness while at home.  He informs that he fell at home but did not hit his head, he lowered himself to the ground due to weakness.  On ER evaluation, patient found with hyponatremia to 115, and a urinalysis suspicious for UTI.  Upon further questioning of both the patient and his son at bedside, they informed that he has been self restricting sodium intake and drinking water in an effort to pursue a healthier lifestyle; furthermore, the son was informed by other physicians that the patient had a low sodium prior to this hospitalization.  On exam, patient with dry oral membranes, and labwork with urinalysis and urine lytes indicative of hypovolemic hyponatremia, along with poor solute intake.  Initially placed on 3% hypertonic saline, fluids were changed to normal saline instead due to a rise from 115 to 119.  Would continue to monitor serum chemistries every 6 hours to ensure that serum sodiums do not exceed a 10 mEq increase over 24 hours due to chronicity.  Currently on Zosyn for antimicrobial coverage- would de-escalate after following up on culture results.  As patient is clinically improved with his mental status, his CT head revealed only chronic microvascular changes, he is hemodynamically stable, and his serum sodiums are improving without the need for hypertonic saline, there is no urgent need for MICU at this time.

## 2022-06-27 NOTE — PROGRESS NOTE ADULT - SUBJECTIVE AND OBJECTIVE BOX
Medicare Wellness Visit  Plan for Preventive Care    A good way for you to stay healthy is to use preventive care. Medicare covers many services that can help you stay healthy. * The goal of these services is to find any health problems as quickly as possible. Finding problems early can help make them easier to treat. Your personal plan below lists the services you may need and when they are due. Health Maintenance Summary     COVID-19 Vaccine (1)  Overdue - never done    Shingles Vaccine (1 of 2)  Overdue - never done    Pneumococcal Vaccine 65+ (1 - PCV)  Overdue - never done    Traditional Medicare- Medicare Wellness Visit (Yearly)  Overdue since 10/28/2021    DM/CKD Microalbumin (Yearly)  Due soon on 8/11/2022    DM/CKD GFR (Yearly)  Ordered on 6/27/2022    Influenza Vaccine (Season Ended)  Next due on 9/1/2022    Depression Screening (Yearly)  Next due on 6/27/2023    DTaP/Tdap/Td Vaccine (2 - Td or Tdap)  Next due on 10/29/2027    Hepatitis B Vaccine   Aged Out    Meningococcal Vaccine   Aged Out    HPV Vaccine   Aged Out           Preventive Care for Women and Men    Heart Screenings (Cardiovascular):  Â· Blood tests are used to check your cholesterol, lipid and triglyceride levels. High levels can increase your risk for heart disease and stroke. High levels can be treated with medications, diet and exercise. Lowering your levels can help keep your heart and blood vessels healthy. Your provider will order these tests if they are needed. Â· An ultrasound is done to see if you have an abdominal aortic aneurysm (AAA). This is an enlargement of one of the main blood vessels that delivers blood to the body. In the [facility], 9,000 deaths are caused by AAA. You may not even know you have this problem and as many as 1 in 3 people will have a serious problem if it is not treated. Early diagnosis allows for more effective treatment and cure.   If you have a family history of AAA or are a male age 65-75 who has smoked, you are at higher risk of an AAA. Your provider can order this test, if needed. Colorectal Screening:  Â· There are many tests that are used to check for cancer of your colon and rectum. You and your provider should discuss what test is best for you and when to have it done. Options include:  Â· Screening Colonoscopy: exam of the entire colon, seen through a flexible lighted tube. Â· Flexible Sigmoidoscopy: exam of the last third (sigmoid portion) of the colon and rectum, seen through a flexible lighted tube. Â· Cologuard DNA stool test: a sample of your stool is used to screen for cancer and unseen blood in your stool. Â· Fecal Occult Blood Test: a sample of your stool is studied to find any unseen blood    Flu Shot:  Â· An immunization that helps to prevent influenza (the flu). You should get this every year. The best time to get the shot is in the fall. Pneumococcal Shot:  â¢ Vaccines are available that can help prevent pneumococcal disease, which is any type of infection caused by Streptococcus pneumoniae bacteria. Their use can prevent some cases of pneumonia, meningitis, and sepsis. There are two types of pneumococcal vaccines:   o Conjugate vaccines (PCV-13 or Prevnar 13Â®) - helps protect against the 13 types of pneumococcal bacteria that are the most common causes of serious infections in children and adults. o Polysaccharide vaccine (PPSV23 or Xhrppegxh86Â®) - helps protect against 23 types of pneumococcal bacteria for patients who are recommended to get it. These vaccines should be given at least 12 months apart. A booster is usually not needed. Hepatitis B Shot:  Â· An immunization that helps to protect people from getting Hepatitis B. Hepatitis B is a virus that spreads through contact with infected blood or body fluids. Many people with the virus do not have symptoms. The virus can lead to serious problems, such as liver disease.  Some people are at higher risk than Infectious Diseases progress note:    Subjective:  Pt seen earlier today.  Denies abd pain, n/v, dysuria, fever/chills.  Now on oral antibiotics    ROS:  CONSTITUTIONAL:  Negative fever or chills, feels well  EYES:  Negative  blurry vision or double vision  CARDIOVASCULAR:  Negative for chest pain or palpitations  RESPIRATORY:  Negative for cough, wheezing, or SOB   GASTROINTESTINAL:  Negative for nausea, vomiting, diarrhea, constipation, or abdominal pain  EXTREMITIES:  No cyanosis/clubbing/edema  GENITOURINARY:  Negative frequency, urgency or dysuria  NEUROLOGIC:  No headache, confusion, dizziness, lightheadedness    Allergies    No Known Allergies    Intolerances        ANTIBIOTICS/RELEVANT:  antimicrobials  cefuroxime   Tablet 500milliGRAM(s) Oral every 12 hours    immunologic:    OTHER:  heparin  Injectable 5000Unit(s) SubCutaneous every 8 hours  acetaminophen   Tablet 650milliGRAM(s) Oral every 6 hours PRN  acetaminophen   Tablet. 650milliGRAM(s) Oral every 6 hours PRN  finasteride 5milliGRAM(s) Oral daily  tamsulosin 0.4milliGRAM(s) Oral at bedtime  tiotropium 18 MICROgram(s) Capsule 1Capsule(s) Inhalation daily  ipratropium 17 MICROgram(s) HFA Inhaler 1Puff(s) Inhalation every 6 hours  pantoprazole    Tablet 40milliGRAM(s) Oral before breakfast  famotidine    Tablet 20milliGRAM(s) Oral daily  docusate sodium 100milliGRAM(s) Oral two times a day  senna 2Tablet(s) Oral at bedtime  polyethylene glycol 3350 17Gram(s) Oral daily PRN  aspirin enteric coated 81milliGRAM(s) Oral daily      Objective:  Vital Signs Last 24 Hrs  T(C): 36.7, Max: 36.7 (06-01 @ 21:42)  T(F): 98.1, Max: 98.1 (06-01 @ 21:42)  HR: 96 (85 - 96)  BP: 141/78 (120/77 - 141/78)  BP(mean): --  RR: 18 (17 - 18)  SpO2: 99% (98% - 100%)    PHYSICAL EXAM:  Constitutional:NAD  Eyes:ANA, EOMI  Ear/Nose/Throat: no oral lesion, no sinus tenderness on percussion	  Neck:no JVD, no lymphadenopathy, supple  Respiratory: CTA silvia  Cardiovascular: S1S2 RRR, no murmurs  Gastrointestinal:soft, (+) BS, no HSM  Extremities:no e/e/c        LABS:                        11.5   7.37  )-----------( 152      ( 01 Jun 2017 06:00 )             33.5     06-01    139  |  104  |  18  ----------------------------<  103<H>  3.3<L>   |  23  |  0.99    Ca    8.2<L>      01 Jun 2017 06:00  Phos  3.0     06-01  Mg     1.8     06-01      MICROBIOLOGY:    Culture - Blood in AM (05.30.17 @ 06:50)    Culture - Blood:   NO ORGANISMS ISOLATED  NO ORGANISMS ISOLATED AT 48 HRS.    Specimen Source: BLOOD VENOUS    Culture - Urine (05.28.17 @ 16:50)    -  Amikacin: S <=16 LAY    -  Ampicillin/Sulbactam: I 16/8 LAY    -  Nitrofurantoin: S <=32 LAY    Culture - Urine:   Insufficient growth, culture re-incubated.    -  Aztreonam: S <=4 LAY    -  Cefoxitin: S <=8 LAY    -  Piperacillin/Tazobactam: S <=16 LAY    -  Ceftazidime: S <=1 LAY    -  Ertapenem: S <=1 LAY    -  Gentamicin: S <=4 LAY    -  Imipenem: S <=1 LAY    -  Meropenem: S <=1 LAY    -  Tobramycin: R >8 LAY    -  Trimethoprim/Sulfamethoxazole: R >2/38 LAY    -  Ampicillin: R >16 LAY    -  Cefazolin: S <=8 LAY    -  Cefepime: S <=4 LAY    -  Ceftriaxone: S <=1 LAY    -  Ciprofloxacin: R >2 LAY    -  Levofloxacin: R >4 LAY    -  Tigecycline: S <=2 LAY    Culture - Urine:   COLONY COUNT: > = 100,000 CFU/ML    Specimen Source: URINE MIDSTREAM    Organism Identification: Escherichia coli    Organism: Escherichia coli    Method Type: MICROSCAN NEG URINE COMBO 61          RADIOLOGY & ADDITIONAL STUDIES:    EXAM:  CT BRAIN        PROCEDURE DATE:  May 31 2017         INTERPRETATION:  CLINICAL INFORMATION: Unresolved slurred speech. Sepsis   and recent fall.    COMPARISON: Brain MRI of 3/31/2017 at 5:34 PM and CT head of 5/20/2017.    PROCEDURE:   Noncontrast CT of the Head was performed from the skull base to the   vertex. Coronal and Sagittal reformats were obtained.    FINDINGS:    There is no acute intracranial hemorrhage, mass effect, midline shift,   extra-axial collection, hydrocephalus, or evidence of acute vascular   territorial infarction. Cerebral volume loss results in prominence of the   ventricles and sulci. Calcified intracranial atherosclerotic disease is   present    The visualized paranasal sinuses and mastoid air cells are clear.   Visualized osseous structures are intact.    IMPRESSION:     No acute intracranial hemorrhage, mass effect, or evidence of acute   vascular territorial infarction. others. Your doctor will tell you if you need this shot. Diabetes Screening:  Â· A test to measure sugar (glucose) in your blood is called a fasting blood sugar. Fasting means you cannot have food or drink for at least 8 hours before the test. This test can detect diabetes long before you may notice symptoms. Glaucoma Screening:  Â· Glaucoma screening is performed by your eye doctor. The test measures the fluid pressure inside your eyes to determine if you have glaucoma. Hepatitis C Screening:  Â· A blood test to see if you have the hepatitis C virus. Hepatitis C attacks the liver and is a major cause of chronic liver disease. Medicare will cover a single screening for all adults born between [facility], or high risk patients (people who have injected illegal drugs or people who have had blood transfusions). High risk patients who continue to inject illegal drugs can be screened for Hepatitis C every year. Smoking and Tobacco-Use Cessation Counseling:  Â· Tobacco is the single greatest cause of disease and early death in our country today. Medication and counseling together can increase a personâs chance of quitting for good. Â· Medicare covers two quitting attempts per year, with four counseling sessions per attempt (eight sessions in a 12 month period)    Preventive Screening tests for Women    Screening Mammograms and Breast Exams:  Â· An x-ray of your breasts to check for breast cancer before you or your doctor may be able to feel it. If breast cancer is found early it can usually be treated with success. Pelvic Exams and Pap Tests:  Â· An exam to check for cervical and vaginal cancer. A Pap test is a lab test in which cells are taken from your cervix and sent to the lab to look for signs of cervical cancer. If cancer of the cervix is found early, chances for a cure are good. Testing can generally end at age 72, or if a woman has a hysterectomy for a benign condition.  Your provider may recommend more frequent testing if certain abnormal results are found. Bone Mass Measurements:  Â· A painless x-ray of your bone density to see if you are at risk for a broken bone. Bone density refers to the thickness of bones or how tightly the bone tissue is packed. Preventive Screening tests for Men    Prostate Screening:  Â· Should you have a prostate cancer test (PSA)? It is up to you to decide if you want a prostate cancer test. Talk to your clinician to find out if the test is right for you. Things for you to consider and talk about should include:  Â· Benefits and harms of the test  Â· Your family history  Â· How your race/ethnicity may influence the test  Â· If the test may impact other medical conditions you have  Â· Your values on screenings and treatments    *Medicare pays for many preventive services to keep you healthy. For some of these services, you might have to pay a deductible, coinsurance, and / or copayment. The amounts vary depending on the type of services you need and the kind of Medicare health plan you have.     For further details on screenings offered by Medicare please visit: [email or web address]

## 2022-12-30 NOTE — PROGRESS NOTE ADULT - SUBJECTIVE AND OBJECTIVE BOX
**MEDICINE PROGRESS NOTE**      Patient is a 72y old  Male who presents with a chief complaint of Altered mental status, lethargy (30 May 2017 12:59)                                                                            SUBJECTIVE / OVERNIGHT EVENTS:    No acute events overnight    HEADACHE[ - ]  FEVER[ - ]   CHILLS[ - ]   CHEST PAIN[ - ]   SOB[ - ]   CONSTIPATION[ - ]    VOMITING[ - ]  DIARRHEA[ - ] ABD PAIN[ - ]    ALLERGIES:  No Known Allergies    MEDICATIONS  (STANDING):  heparin  Injectable 5000Unit(s) SubCutaneous every 8 hours  finasteride 5milliGRAM(s) Oral daily  tamsulosin 0.4milliGRAM(s) Oral at bedtime  tiotropium 18 MICROgram(s) Capsule 1Capsule(s) Inhalation daily  ipratropium 17 MICROgram(s) HFA Inhaler 1Puff(s) Inhalation every 6 hours  pantoprazole    Tablet 40milliGRAM(s) Oral before breakfast  famotidine    Tablet 20milliGRAM(s) Oral daily  docusate sodium 100milliGRAM(s) Oral two times a day  senna 2Tablet(s) Oral at bedtime  piperacillin/tazobactam IVPB. 3.375Gram(s) IV Intermittent every 8 hours  sodium chloride 0.9%. 1000milliLiter(s) IV Continuous <Continuous>    MEDICATIONS  (PRN):  acetaminophen   Tablet 650milliGRAM(s) Oral every 6 hours PRN For Temp greater than 38.5 C (101.3 F)  acetaminophen   Tablet. 650milliGRAM(s) Oral every 6 hours PRN Mild to moderate pain      Vital Signs Last 24 Hrs  T(C): 37.2, Max: 37.2 (05-31 @ 05:14)  HR: 82 (82 - 88)  BP: 122/60 (122/60 - 128/71)  RR: 18 (17 - 18)  SpO2: 99% (98% - 99%)  Wt(kg): --  CAPILLARY BLOOD GLUCOSE      I&O's Summary    I & Os for current day (as of 31 May 2017 09:05)  =============================================  IN: 660 ml / OUT: 2850 ml / NET: -2190 ml              PHYSICAL EXAM:    GENERAL: NAD  HEENT: mmm  CHEST/PULM: CTAB  HEART: RRR, no murmurs  ABDOMEN: Soft, NT/ND, BS(+)  EXTREMITIES:  ROM intact  PSYCH: appropriate mood and affect, no behavioral disturbance  NEURO: non-focal, AO x3  SKIN: no edema, damp warm skin    LABS:                        10.6   8.64  )-----------( 134      ( 31 May 2017 06:30 )             30.6     05-31    133<L>  |  102  |  18  ----------------------------<  108<H>  3.8   |  22  |  1.09    Ca    7.8<L>      31 May 2017 06:30  Phos  2.1     05-31  Mg     1.9     05-31    TPro  5.5<L>  /  Alb  2.2<L>  /  TBili  1.1  /  DBili  x   /  AST  52<H>  /  ALT  34  /  AlkPhos  93  05-30                MICROBIOLOGY:  Blood Cultures    Blood Culture  NO ORGANISMS ISOLATED  NO ORGANISMS ISOLATED AT 24 HOURS   05-30 @ 06:50      Culture - Blood (05.28.17 @ 15:32)    Culture - Blood:   EC^Escherichia coli    Specimen Source: BLOOD PERIPHERAL    Gram Stain Blood:   ***** CRITICAL RESULT *****  PERSON CALLED / READ-BACK: BARRINGTON BORJA  DATE / TIME CALLED: 05/29/17 0441  CALLED BY: SHAMAR STERN^Gram Neg Rods  AFTER: 11 HOURS INCUBATION  BOTTLE: AEROBIC   ANAEROBIC BOTTLES        Urine Culture   05-28 @ 16:50    Organism ID--  Blood Culture  EC^Escherichia coli   05-28 @ 15:32      RADIOLOGY & ADDITIONAL TESTS:    Imaging Personally Reviewed:    Consultant(s) Notes Reviewed:      Care Discussed with Consultants/Other Providers: ,DirectAddress_Unknown

## 2023-02-21 NOTE — ED ADULT NURSE NOTE - PMH
Infusion Nursing Note:  Nico Morales presents today for annual Reclast infusion.    Patient seen by provider today: No   present during visit today: Not Applicable.    Note: Patient denies dental issues at this time.  Patient will inform dentist that he receives Reclast infusion, prior to any invasive dental work in the future.    Reminded patient to drink 6-8 glasses of water today, and tomorrow, to protect kidneys.    Intravenous Access:  Peripheral IV placed.    Treatment Conditions:  Lab Results   Component Value Date     01/05/2023    POTASSIUM 4.0 01/05/2023    MAG 2.3 06/20/2022    CR 0.76 01/05/2023    CLAUDIA 9.1 01/05/2023    BILITOTAL 0.3 01/05/2023    ALBUMIN 3.6 01/05/2023    ALT 14 01/05/2023    AST 21 01/05/2023     Results reviewed, labs MET treatment parameters, ok to proceed with treatment.  Per pharmacistBarry, labs from 1/5/23 okay to use. Do not need to recheck labs today.    Post Infusion Assessment:  Patient tolerated infusion without incident.  Blood return noted pre and post infusion.  Site patent and intact, free from redness, edema or discomfort.  No evidence of extravasations.  Access discontinued per protocol.     Discharge Plan:   AVS to patient via MYCHART.  Patient will return in one year for next appointment.   Patient discharged in stable condition accompanied by: self.  Departure Mode: Ambulatory.      Suzanna Abreu RN                     BPH (benign prostatic hyperplasia)    HTN (hypertension)

## 2023-09-24 NOTE — ED STATDOCS - EYES, MLM
Manual pressure held.  Bandage applied.  clear bilaterally.  Pupils equal, round, and reactive to light.

## 2023-12-20 NOTE — ED ADULT NURSE NOTE - BREATHING, MLM
Care Management Initial Consult    General Information  Assessment completed with: Patient, Patient  Type of CM/SW Visit: Initial Assessment    Primary Care Provider verified and updated as needed: Yes   Readmission within the last 30 days: no previous admission in last 30 days      Reason for Consult: discharge planning  Advance Care Planning: Advance Care Planning Reviewed: verified with patient        Communication Assessment  Patient's communication style: spoken language (English or Bilingual)    Hearing Difficulty or Deaf: no   Wear Glasses or Blind: no    Cognitive  Cognitive/Neuro/Behavioral: orientation  Level of Consciousness: intermittent confusion, alert  Arousal Level: opens eyes spontaneously  Orientation: disoriented to, place  Mood/Behavior: calm, cooperative  Best Language: 0 - No aphasia  Speech: clear    Living Environment:   People in home: spouse  Kirsten  [facility] Ar[facility]      Able to return to prior arrangements: yes     Family/Social Support:  Care provided by: self, spouse/significant other  Provides care for: no one, unable/limited ability to care for self  Marital Status:   Wife  Kirsten       Description of Support System: Supportive, Involved    Support Assessment: Adequate family and caregiver support    Current Resources:   Patient receiving home care services: No     Community Resources: Dialysis Services  Equipment currently used at home: walker, standard, cane, straight  Supplies currently used at home: None    Employment/Financial:  Employment Status: retired        Financial Concerns: none   Referral to Financial Worker: No     Does the patient's insurance plan have a 3 day qualifying hospital stay waiver?  Yes     Which insurance plan 3 day waiver is available? Alternative insurance waiver    Will the waiver be used for post-acute placement? No    Lifestyle & Psychosocial Needs:  Social Determinants of Health     Food Insecurity:  Not on file   Depression: Not at risk (7/27/2023)    PHQ-2     PHQ-2 Score: 2   Housing Stability: Not on file   Tobacco Use: Medium Risk (10/31/2023)    Patient History     Smoking Tobacco Use: Former     Smokeless Tobacco Use: Never     Passive Exposure: Not on file   Financial Resource Strain: Not on file   Alcohol Use: Not on file   Transportation Needs: Not on file   Physical Activity: Not on file   Interpersonal Safety: Not on file   Stress: Not on file   Social Connections: Not on file       Functional Status:  Prior to admission patient needed assistance:   Dependent ADLs:: Independent  Dependent IADLs:: Cleaning, Cooking, Laundry, Shopping, Meal Preparation, Transportation     Additional Information:  Writer met with patient at bedside to review role of care management services, discuss goals of care and assess need for any possible services at discharge. Patient alert, answering questions appropriately and engaged in the conversation. Patient has no HCD. Lives with spouse in a townhouse. Report independent with ADLS, but needs assistance with some IADLs. Has walker and cane, but does not use. No community resources. Attends [employer] [facility] M/W/F. Goal is to return home. Spouse will transport.       Kayy Velarde RN      Spontaneous, unlabored and symmetrical